# Patient Record
Sex: FEMALE | Race: WHITE | Employment: PART TIME | URBAN - NONMETROPOLITAN AREA
[De-identification: names, ages, dates, MRNs, and addresses within clinical notes are randomized per-mention and may not be internally consistent; named-entity substitution may affect disease eponyms.]

---

## 2020-08-05 ENCOUNTER — OFFICE VISIT (OUTPATIENT)
Dept: FAMILY MEDICINE CLINIC | Age: 20
End: 2020-08-05
Payer: COMMERCIAL

## 2020-08-05 VITALS
OXYGEN SATURATION: 99 % | HEART RATE: 121 BPM | HEIGHT: 68 IN | BODY MASS INDEX: 18.34 KG/M2 | SYSTOLIC BLOOD PRESSURE: 122 MMHG | DIASTOLIC BLOOD PRESSURE: 74 MMHG | WEIGHT: 121 LBS

## 2020-08-05 PROCEDURE — 99214 OFFICE O/P EST MOD 30 MIN: CPT | Performed by: INTERNAL MEDICINE

## 2020-08-05 RX ORDER — BUSPIRONE HYDROCHLORIDE 10 MG/1
10 TABLET ORAL 2 TIMES DAILY
COMMUNITY
Start: 2020-08-04 | End: 2020-11-19 | Stop reason: SDUPTHER

## 2020-08-05 RX ORDER — LORATADINE 10 MG/1
10 CAPSULE, LIQUID FILLED ORAL DAILY
COMMUNITY

## 2020-08-05 RX ORDER — BACLOFEN 10 MG/1
10 TABLET ORAL 2 TIMES DAILY
Qty: 60 TABLET | Refills: 3 | Status: SHIPPED | OUTPATIENT
Start: 2020-08-05 | End: 2020-11-19 | Stop reason: SDUPTHER

## 2020-08-05 RX ORDER — BACLOFEN 10 MG/1
10 TABLET ORAL 2 TIMES DAILY
COMMUNITY
End: 2020-08-05 | Stop reason: SDUPTHER

## 2020-08-05 RX ORDER — DULOXETIN HYDROCHLORIDE 30 MG/1
30 CAPSULE, DELAYED RELEASE ORAL 2 TIMES DAILY
COMMUNITY
End: 2020-11-19 | Stop reason: SDUPTHER

## 2020-08-05 RX ORDER — FLUDROCORTISONE ACETATE 0.1 MG/1
0.1 TABLET ORAL DAILY
COMMUNITY
End: 2021-01-15 | Stop reason: SDUPTHER

## 2020-08-05 RX ORDER — LAMOTRIGINE 100 MG/1
100 TABLET ORAL DAILY
COMMUNITY
End: 2020-11-19 | Stop reason: SDUPTHER

## 2020-08-05 SDOH — HEALTH STABILITY: MENTAL HEALTH: HOW OFTEN DO YOU HAVE A DRINK CONTAINING ALCOHOL?: NEVER

## 2020-08-05 ASSESSMENT — PATIENT HEALTH QUESTIONNAIRE - PHQ9
SUM OF ALL RESPONSES TO PHQ QUESTIONS 1-9: 0
SUM OF ALL RESPONSES TO PHQ QUESTIONS 1-9: 0
2. FEELING DOWN, DEPRESSED OR HOPELESS: 0
SUM OF ALL RESPONSES TO PHQ9 QUESTIONS 1 & 2: 0
1. LITTLE INTEREST OR PLEASURE IN DOING THINGS: 0

## 2020-08-05 NOTE — PROGRESS NOTES
[address]  [address]  Dept: [phone]  Dept Fax: [phone]  Loc: [phone]     Visit Date:  8/5/2020    Patient:  Luis Bustillo  YOB: 2000    HPI:   [facility] presents today for   Chief Complaint   Patient presents with    New Patient     patient is here today to establish and discuss disability paperwork. Kay Tirado HPI-20 year old male(biologicale female and now identifies as male) is coming in today to establish care with us as well as to get the forms filled filled out for his job. 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 History of postural orthostatic tachycardia syndrome chronic constipation and instability of multiple joints with joint pains, early satiety concerning for probable gastroparesis. Please note most of the records are placed in media by her genetic counselor as well as psychologist/psychiatrist.    Patient recently moved from [place] in May and is coming in today to establish with us as well as to get paperwork filled out for his job. 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 Given hypermobile Christen-Danlos associated syndrome causing symptoms he has noticed on prolonged standing and walking causes subluxation of femur/hip/knee as well as her pelvic area and causing joint instability/pain. Is been using stool as well as wearing the knee braces to help combat those effects. He had always had hip problems as a child since he got diagnosed. His mother suffers with the same disease as well. Used to have problems with subluxation of multiple joints all the time during soccer practices.       He has also noticed when he uses III, GERD (gastroesophageal reflux disease), Headache, Menorrhagia, Postural orthostatic tachycardia syndrome, Syringomyelia ([address] and Urinary incontinence. Past Surgical History   has a past surgical history that includes Cholecystectomy and [place] tooth extraction (2019). Family History  family history includes Alcohol Abuse in his maternal grandfather and paternal uncle; Dementia in his maternal grandmother; Depression in his father and mother; Heart Attack in his maternal grandfather, maternal uncle, and paternal grandfather; Heart Disease in his maternal grandfather, maternal uncle, mother, paternal grandfather, and sister; High Cholesterol in his maternal grandfather and mother; Learning Disabilities in his brother; Mental Illness in his brother, father, mother, paternal aunt, and paternal uncle; [de-identified] / [place] in his mother; Other in his mother; Substance Abuse in his paternal aunt, paternal cousin, and paternal uncle. Social History   reports that he has never smoked. He has never used smokeless tobacco. He reports that he does not drink alcohol or use drugs. Health Maintenance:    Health Maintenance   Topic Date Due    Varicella vaccine (1 of 2 - 2-dose childhood series) 09/24/2001    HPV vaccine (1 - 2-dose series) 09/24/2011    HIV screen  09/24/2015    Chlamydia screen  09/24/2016    DTaP/Tdap/Td vaccine (1 - Tdap) 09/24/2019    Flu vaccine (1) 09/01/2020    Hepatitis A vaccine  Aged Out    Hepatitis B vaccine  Aged Out    Hib vaccine  Aged Out    Meningococcal (ACWY) vaccine  Aged Out    Pneumococcal 0-64 years Vaccine  Aged Out       Subjective:      Review of Systems   Constitutional: Negative for fatigue, fever and unexpected weight change. HENT: Negative for ear pain, postnasal drip, rhinorrhea, sinus pain, sore throat and trouble swallowing. Eyes: Negative for visual disturbance. Respiratory: Negative for cough, chest tightness and shortness of breath. syndrome)  baclofen (LIORESAL) 10 MG tablet   6. Muscle spasm  baclofen (LIORESAL) 10 MG tablet   7. Arthralgia, unspecified joint  baclofen (LIORESAL) 10 MG tablet    Callie Phelps MD, OB/GYN, [place]   8. Dysmenorrhea  baclofen (LIORESAL) 10 MG tablet    Callie Phelps MD, OB/GYN, [place]   9. Menorrhagia with irregular cycle  baclofen (LIORESAL) 10 MG tablet    Callie Phelps MD, OB/GYN, [place]   10. Mood disorder (HCC)  baclofen (LIORESAL) 10 MG tablet   11. Gastroparesis  NM GASTRIC EMPTYING         PLAN     Forms filled out and authorization given to use crutches as well as stool especially with prolonged standing and prolonged walking. 25 pound weight restriction. Hip pain is worse with prolonged standing. Is been doing physical therapy and exercises at home. He reports he would get in touch with his genetic counselor to further get recommendations of establishing with a neurologist in the near future. Not interested in physical therapy referral today. Nuclear emptying study test placed. Refills om Baclofen placed. OBGYN referral placed as well. Orders Placed This Encounter   Procedures    NM GASTRIC EMPTYING     Standing Status:   Future     Standing Expiration Date:   8/5/2021     Order Specific Question:   Reason for exam:     Answer:   early satiety,nausea after eating/chronic constipation-twice a week, bloating    Nanette Lucero MD, OB/GYN, [place]     Referral Priority:   Routine     Referral Type:   Eval and Treat     Referral Reason:   Specialty Services Required     Referred to Provider:   Inge Tolentino MD     Requested Specialty:   Obstetrics & Gynecology     Number of Visits Requested:   1     No follow-ups on file. Patient given educational materials - see patient instructions. Discussed use, benefit, and side effects of prescribed medications. All patient questions answered. Pt voiced understanding. Reviewed health maintenance. Electronically signed Phi Oneal MD on 8/5/2020 at 2:24 PM EDT

## 2020-08-06 ASSESSMENT — ENCOUNTER SYMPTOMS
COUGH: 0
BLOOD IN STOOL: 0
DIARRHEA: 0
RHINORRHEA: 0
CHEST TIGHTNESS: 0
SORE THROAT: 0
SHORTNESS OF BREATH: 0
TROUBLE SWALLOWING: 0
ABDOMINAL PAIN: 0
SINUS PAIN: 0

## 2020-08-26 ENCOUNTER — OFFICE VISIT (OUTPATIENT)
Dept: FAMILY MEDICINE CLINIC | Age: 20
End: 2020-08-26
Payer: COMMERCIAL

## 2020-08-26 ENCOUNTER — HOSPITAL ENCOUNTER (OUTPATIENT)
Age: 20
Setting detail: SPECIMEN
Discharge: HOME | End: 2020-08-26
Payer: COMMERCIAL

## 2020-08-26 VITALS
WEIGHT: 124 LBS | HEART RATE: 108 BPM | OXYGEN SATURATION: 99 % | BODY MASS INDEX: 18.99 KG/M2 | SYSTOLIC BLOOD PRESSURE: 122 MMHG | DIASTOLIC BLOOD PRESSURE: 78 MMHG

## 2020-08-26 LAB
ANION GAP SERPL CALCULATED.3IONS-SCNC: 8 MMOL/L (ref 9–17)
BUN BLDV-MCNC: 14 MG/DL (ref 6–20)
BUN/CREAT BLD: 22 (ref 9–20)
CALCIUM SERPL-MCNC: 9.5 MG/DL (ref 8.6–10.4)
CHLORIDE BLD-SCNC: 105 MMOL/L (ref 98–107)
CO2: 28 MMOL/L (ref 20–31)
CREAT SERPL-MCNC: 0.63 MG/DL (ref 0.5–0.9)
GFR AFRICAN AMERICAN: ABNORMAL ML/MIN
GFR NON-AFRICAN AMERICAN: ABNORMAL ML/MIN
GFR SERPL CREATININE-BSD FRML MDRD: ABNORMAL ML/MIN/{1.73_M2}
GFR SERPL CREATININE-BSD FRML MDRD: ABNORMAL ML/MIN/{1.73_M2}
GLUCOSE BLD-MCNC: 72 MG/DL (ref 70–99)
POTASSIUM SERPL-SCNC: 4 MMOL/L (ref 3.7–5.3)
SODIUM BLD-SCNC: 141 MMOL/L (ref 135–144)

## 2020-08-26 PROCEDURE — 99214 OFFICE O/P EST MOD 30 MIN: CPT | Performed by: INTERNAL MEDICINE

## 2020-08-26 PROCEDURE — 36415 COLL VENOUS BLD VENIPUNCTURE: CPT

## 2020-08-26 PROCEDURE — 80048 BASIC METABOLIC PNL TOTAL CA: CPT

## 2020-08-26 NOTE — PROGRESS NOTES
[address]  [address]  Dept: [phone]  Dept Fax: [phone]  Loc: [phone]     Visit Date:  8/26/2020    Patient:  Vanita Benson  YOB: 2000    HPI:   Vanita Benson presents today for   Chief Complaint   Patient presents with    Other     patient is here today to have form filled out for work. Donell Rodas HPI 23year old patient with a history of hypermobile Christen-Danlos syndrome associated with chronic muscle aches and pains, instability of her joints, hypermobility of her joints as well as postural orthostatic tachycardia syndrome, chronic constipation and possible gastroparesis is coming in today requesting forms to be filled out for her work. Is requesting upright MRIs of her spine given her history of craniocervical instability and chronic low back pain with joint instability with hypermobility pf joints. Severe pain in the back while lifting objects, bending and straightening the spine. A feeling of locking in between a physical activity such as getting up from a chair. Muscle spasms, Pain may radiate down into the legs and buttocks, generally affecting one side of the body Numbness in the lower extremities and arms. The symptoms may get aggravated after prolonged sitting or standing. Medications  Prior to Visit Medications    Medication Sig Taking?  Authorizing Provider   norethindrone (AYGESTIN) 5 MG tablet Take 0.35 mg by mouth daily  Historical Provider, MD   lamoTRIgine (LAMICTAL) 100 MG tablet Take 100 mg by mouth daily Take one daily  Historical Provider, MD   DULoxetine (CYMBALTA) 30 MG extended release capsule Take 30 mg by mouth 2 times daily Take 1 pill twice daily  Historical Provider, MD   busPIRone (BUSPAR) 10 MG tablet 10 mg 2 times daily Take one pill twice daily  Historical Provider, MD   fludrocortisone (FLORINEF) 0.1 MG tablet Take 0.1 mg by mouth daily Take one pill once daily  Historical Provider, MD   loratadine (CLARITIN) 10 MG capsule Take 10 mg by mouth daily  Historical Provider, MD   baclofen (LIORESAL) 10 MG tablet Take 1 tablet by mouth 2 times daily  Tez Mena MD        Allergies:  is allergic to reglan [metoclopramide]; corn-containing products; abilify [aripiprazole]; and aloe vera. Past Medical History:   has a past medical history of Anxiety, Autism spectrum disorder, Bipolar disorder ([address] Depression, Dysmenorrhea, Christen-Danlos syndrome type III, GERD (gastroesophageal reflux disease), Headache, Menorrhagia, Postural orthostatic tachycardia syndrome, Syringomyelia ([address] and Urinary incontinence. Past Surgical History   has a past surgical history that includes Cholecystectomy and [place] tooth extraction (2019). Family History  family history includes Alcohol Abuse in his maternal grandfather and paternal uncle; Dementia in his maternal grandmother; Depression in his father and mother; Heart Attack in his maternal grandfather, maternal uncle, and paternal grandfather; Heart Disease in his maternal grandfather, maternal uncle, mother, paternal grandfather, and sister; High Cholesterol in his maternal grandfather and mother; Learning Disabilities in his brother; Mental Illness in his brother, father, mother, paternal aunt, and paternal uncle; [de-identified] / [place] in his mother; Other in his mother; Substance Abuse in his paternal aunt, paternal cousin, and paternal uncle. Social History   reports that he has never smoked. He has never used smokeless tobacco. He reports that he does not drink alcohol or use drugs.     Health Maintenance:    Health Maintenance   Topic Date Due    Varicella vaccine (1 of 2 - 2-dose childhood series) 09/24/2001    HPV vaccine (1 - 2-dose series) 09/24/2011    HIV screen  09/24/2015    Chlamydia screen  09/24/2016    DTaP/Tdap/Td vaccine (1 - Tdap) 09/24/2019    Flu vaccine (1) 09/01/2020    Hepatitis A vaccine  Aged Out    Hepatitis B vaccine  Aged Out    Hib vaccine  Aged Out    Meningococcal (ACWY) vaccine  Aged Out    Pneumococcal 0-64 years Vaccine  Aged Out       Subjective:      Review of Systems   Constitutional: Negative for fatigue, fever and unexpected weight change. HENT: Negative for ear pain, postnasal drip, rhinorrhea, sinus pain, sore throat and trouble swallowing. Eyes: Negative for visual disturbance. Respiratory: Negative for cough, chest tightness and shortness of breath. Cardiovascular: Negative for chest pain and leg swelling. Gastrointestinal: Negative for abdominal pain, blood in stool and diarrhea. Endocrine: Negative for polyuria. Genitourinary: Negative for difficulty urinating and flank pain. Musculoskeletal: Negative for arthralgias, joint swelling and myalgias. Skin: Negative for rash. Allergic/Immunologic: Negative for environmental allergies. Neurological: Negative for weakness, light-headedness, numbness and headaches. Hematological: Negative for adenopathy. Psychiatric/Behavioral: Negative for behavioral problems and suicidal ideas. The patient is not nervous/anxious. Objective:     /78 (Site: Left Upper Arm, Position: Sitting)   Pulse 108   Wt 124 lb (56.2 kg)   SpO2 99%   BMI 18.99 kg/m²     Physical Exam  Vitals signs and nursing note reviewed. HENT:      Head: Normocephalic and atraumatic. Cardiovascular:      Rate and Rhythm: Normal rate and regular rhythm. Pulmonary:      Effort: Pulmonary effort is normal.      Breath sounds: Normal breath sounds. No stridor. Musculoskeletal:      Comments: Passive apposition of the thumb to the flexor aspect of the forearm  Flexion of waist with palms on the floor (and with the knees fully extended)     Skin:     General: Skin is warm. Comments: Skin hyperextensibility. Neurological:      Mental Status: He is oriented to person, place, and time. Mental status is at baseline. Psychiatric:         Mood and Affect: Mood normal.             Assessment       Diagnosis Orders   1. Spinal instability of cervicothoracic region  MRI CERVICAL SPINE WITH CONTRAST    MRI THORACIC SPINE W CONTRAST    MRI LUMBAR SPINE W CONTRAST    Basic Metabolic Panel   2. Christen-Danlos syndrome  MRI CERVICAL SPINE WITH CONTRAST    MRI THORACIC SPINE W CONTRAST    MRI LUMBAR SPINE W CONTRAST    Basic Metabolic Panel   3. Chronic low back pain, unspecified back pain laterality, unspecified whether sciatica present  MRI CERVICAL SPINE WITH CONTRAST    MRI THORACIC SPINE W CONTRAST    MRI LUMBAR SPINE W CONTRAST    Basic Metabolic Panel   4. Joint instability  MRI CERVICAL SPINE WITH CONTRAST    MRI THORACIC SPINE W CONTRAST    MRI LUMBAR SPINE W CONTRAST    Basic Metabolic Panel   5. Hypermobile joints  MRI CERVICAL SPINE WITH CONTRAST    MRI THORACIC SPINE W CONTRAST    MRI LUMBAR SPINE W CONTRAST    Basic Metabolic Panel         PLAN     Upright MRI C/T/L spine with contrast ordered. Encourage muscle strengthening exercises.          Orders Placed This Encounter   Procedures    MRI CERVICAL SPINE WITH CONTRAST     Standing Status:   Future     Standing Expiration Date:   8/26/2021     Order Specific Question:   Reason for exam:     Answer:   cervicothoracic spinal instability/low back pain/christen danlos syndrome/hypermobility    MRI THORACIC SPINE W CONTRAST     Standing Status:   Future     Standing Expiration Date:   8/26/2021     Order Specific Question:   Reason for exam:     Answer:   cervicothoracic spinal instability/low back pain/christen danlos syndrome/hypermobility    MRI LUMBAR SPINE W CONTRAST     Standing Status:   Future     Standing Expiration Date:   8/26/2021     Order Specific Question:   Reason for exam:     Answer:   cervicothoracic spinal instability/low back pain/christen danlos syndrome/hypermobility    Basic Metabolic Panel     Standing Status:   Future Number of Occurrences:   1     Standing Expiration Date:   8/26/2021        No follow-ups on file. Patient given educational materials - see patient instructions. Discussed use, benefit, and side effects of prescribed medications. All patient questions answered. Pt voiced understanding. Reviewed health maintenance.        Electronically signed Gordon Rapp MD on 8/26/2020 at 11:14 AM EDT

## 2020-08-27 ASSESSMENT — ENCOUNTER SYMPTOMS
SINUS PAIN: 0
DIARRHEA: 0
BLOOD IN STOOL: 0
CHEST TIGHTNESS: 0
COUGH: 0
RHINORRHEA: 0
ABDOMINAL PAIN: 0
TROUBLE SWALLOWING: 0
SHORTNESS OF BREATH: 0
SORE THROAT: 0

## 2020-08-31 ENCOUNTER — TELEPHONE (OUTPATIENT)
Dept: FAMILY MEDICINE CLINIC | Age: 20
End: 2020-08-31

## 2020-09-16 NOTE — TELEPHONE ENCOUNTER
I called and spoke with Luis Armando of [facility] and they are going to initiate a new request for MRI. I called and spoke with Ale with Luis Armando to start new precert.

## 2020-09-17 ENCOUNTER — TELEPHONE (OUTPATIENT)
Dept: FAMILY MEDICINE CLINIC | Age: 20
End: 2020-09-17

## 2020-09-17 ENCOUNTER — HOSPITAL ENCOUNTER (OUTPATIENT)
Age: 20
Setting detail: SPECIMEN
Discharge: HOME | End: 2020-09-17
Payer: COMMERCIAL

## 2020-09-17 LAB
CHOLESTEROL/HDL RATIO: 2.6
CHOLESTEROL: 168 MG/DL
HDLC SERPL-MCNC: 64 MG/DL
LDL CHOLESTEROL: 93 MG/DL (ref 0–130)
TRIGL SERPL-MCNC: 53 MG/DL
VLDLC SERPL CALC-MCNC: NORMAL MG/DL (ref 1–30)

## 2020-09-17 PROCEDURE — 80061 LIPID PANEL: CPT

## 2020-09-17 PROCEDURE — 36415 COLL VENOUS BLD VENIPUNCTURE: CPT

## 2020-09-28 ENCOUNTER — TELEPHONE (OUTPATIENT)
Dept: FAMILY MEDICINE CLINIC | Age: 20
End: 2020-09-28

## 2020-09-29 ENCOUNTER — HOSPITAL ENCOUNTER (OUTPATIENT)
Dept: GENERAL RADIOLOGY | Age: 20
Discharge: HOME | End: 2020-10-01
Payer: COMMERCIAL

## 2020-09-29 ENCOUNTER — NURSE ONLY (OUTPATIENT)
Dept: FAMILY MEDICINE CLINIC | Age: 20
End: 2020-09-29
Payer: COMMERCIAL

## 2020-09-29 PROCEDURE — 90471 IMMUNIZATION ADMIN: CPT | Performed by: INTERNAL MEDICINE

## 2020-09-29 PROCEDURE — 72202 X-RAY EXAM SI JOINTS 3/> VWS: CPT

## 2020-09-29 PROCEDURE — 73521 X-RAY EXAM HIPS BI 2 VIEWS: CPT

## 2020-09-29 PROCEDURE — 90686 IIV4 VACC NO PRSV 0.5 ML IM: CPT | Performed by: INTERNAL MEDICINE

## 2020-09-29 NOTE — PROGRESS NOTES
Vaccine Information Sheet, \"Influenza - Inactivated\"  given to Marcellus Garces  ,or parent/legal guardian of  Marcellus Garces and verbalized understanding. Patient responses:    Have you ever had a reaction to a flu vaccine? No  Are you able to eat eggs without adverse effects? Yes  Do you have any current illness? No  Have you ever had [place] Syndrome? No    Flu vaccine given per order. Please see immunization tab.

## 2020-10-14 ENCOUNTER — OFFICE VISIT (OUTPATIENT)
Dept: ORTHOPEDIC SURGERY | Age: 20
End: 2020-10-14
Payer: COMMERCIAL

## 2020-10-14 VITALS — HEIGHT: 68 IN | WEIGHT: 128 LBS | BODY MASS INDEX: 19.4 KG/M2

## 2020-10-14 PROCEDURE — 99203 OFFICE O/P NEW LOW 30 MIN: CPT | Performed by: ORTHOPAEDIC SURGERY

## 2020-10-14 ASSESSMENT — ENCOUNTER SYMPTOMS
VOMITING: 0
CHEST TIGHTNESS: 0
ABDOMINAL PAIN: 0
COUGH: 0
APNEA: 0

## 2020-10-14 NOTE — PROGRESS NOTES
[facility]  [id number] [facility] [address]  Dept: [phone]    Ambulatory Orthopedic New Patient Visit      CHIEF COMPLAINT:    Chief Complaint   Patient presents with    New Patient     Femoral anteversion        HISTORY OF PRESENT ILLNESS:      The patient is a 21 y.o. adult who is being seen  for consultation and evaluation of bilateral hip pain with the right hip being the main issue. Patient states that he has had pain to the right hip since he was 6years old. The patient states that there was no injuries to the hips. He has pain with long distance walking. The patient states that he has been diagnosed with a connective tissue disorder and has his right hip dislocate often. The patient recalls dislocation to the right hip since he was 12years old. The patient states that after he placed his hip back into place the pain lessens but is not able to do activities normally. The patient uses crutches for the hips since June of 2019. The crutches were prescribed by PCP for the hip issue. Patient states that he does walk around without the crutches at times. Patient has tried some formal therapy for the hips and did note that the therapy did not help. The patients right hip is worse than his left hip. Patient has a history of Christen-Danlos syndrome, Spinal instability of cervical region, Syringomyelia.          Past Medical History:    Past Medical History:   Diagnosis Date    Anxiety     Autism spectrum disorder 2019    Bipolar disorder ([address]     Depression     Dysmenorrhea     Christen-Danlos syndrome type III 2019    GERD (gastroesophageal reflux disease)     Headache     Menorrhagia     Postural orthostatic tachycardia syndrome 2014    Syringomyelia ([address] 2019    Urinary incontinence        Past Surgical History:    Past Surgical History:   Procedure Laterality Date    CHOLECYSTECTOMY      WISDOM TOOTH EXTRACTION  2019       Current Medications:   Current Outpatient Medications   Medication Sig Dispense Refill    norethindrone (AYGESTIN) 5 MG tablet Take 0.35 mg by mouth daily      lamoTRIgine (LAMICTAL) 100 MG tablet Take 100 mg by mouth daily Take one daily      DULoxetine (CYMBALTA) 30 MG extended release capsule Take 30 mg by mouth 2 times daily Take 1 pill twice daily      busPIRone (BUSPAR) 10 MG tablet 10 mg 2 times daily Take one pill twice daily      fludrocortisone (FLORINEF) 0.1 MG tablet Take 0.1 mg by mouth daily Take one pill once daily      loratadine (CLARITIN) 10 MG capsule Take 10 mg by mouth daily      baclofen (LIORESAL) 10 MG tablet Take 1 tablet by mouth 2 times daily 60 tablet 3     No current facility-administered medications for this visit. Allergies:    Reglan [metoclopramide]; Corn-containing products;  Abilify [aripiprazole]; and Aloe vera    Social History:   Social History     Socioeconomic History    Marital status: Single     Spouse name: Not on file    Number of children: Not on file    Years of education: Not on file    Highest education level: Not on file   Occupational History    Not on file   Social Needs    Financial resource strain: Not on file    Food insecurity     Worry: Not on file     Inability: Not on file    Transportation needs     Medical: Not on file     Non-medical: Not on file   Tobacco Use    Smoking status: Never Smoker    Smokeless tobacco: Never Used   Substance and Sexual Activity    Alcohol use: Never     Frequency: Never    Drug use: Never    Sexual activity: Never   Lifestyle    Physical activity     Days per week: Not on file     Minutes per session: Not on file    Stress: Not on file   Relationships    Social connections     Talks on phone: Not on file     Gets together: Not on file     Attends Mandaeism service: Not on file     Active member of club or organization: Not on file     Attends meetings of clubs or organizations: Not on file     Relationship status: Not on file    Intimate partner violence     Fear of current or ex partner: Not on file     Emotionally abused: Not on file     Physically abused: Not on file     Forced sexual activity: Not on file   Other Topics Concern    Not on file   Social History Narrative    Not on file       Family History:  Family History   Problem Relation Age of Onset    Heart Disease Mother     Other Mother     Mental Illness Mother    Juan Snow / Reynold Mother     Depression Mother     High Cholesterol Mother     Mental Illness Father     Depression Father     Heart Disease Sister     Mental Illness Brother     Learning Disabilities Brother     Heart Attack Maternal Uncle     Heart Disease Maternal Uncle     Mental Illness Paternal Aunt     Substance Abuse Paternal Aunt     Mental Illness Paternal Uncle     Substance Abuse Paternal Uncle     Alcohol Abuse Paternal Uncle     Dementia Maternal Grandmother     Heart Attack Maternal Grandfather     Heart Disease Maternal Grandfather     High Cholesterol Maternal Grandfather     Alcohol Abuse Maternal Grandfather     Heart Attack Paternal Grandfather     Heart Disease Paternal Grandfather     Substance Abuse Paternal Cousin          REVIEW OF SYSTEMS:  Review of Systems   Constitutional: Negative for chills and fever. Respiratory: Negative for apnea, cough and chest tightness. Cardiovascular: Negative for chest pain and palpitations. Gastrointestinal: Negative for abdominal pain and vomiting. Genitourinary: Negative for difficulty urinating. Musculoskeletal: Positive for arthralgias (bilateral hips). Negative for gait problem, joint swelling and myalgias. Neurological: Negative for dizziness, weakness and numbness. I have reviewed the CC, HPI, ROS, PMH, FHX, Social History.    I agree with the documentation provided by other staff, residents and havereviewed their documentation prior to providing my signature indicating agreement. PHYSICAL EXAM:  Ht 5' 8\" (1.727 m)   Wt 128 lb (58.1 kg)   BMI 19.46 kg/m²  Body mass index is 19.46 kg/m². Physical Exam  Gen: alert and oriented  Psych:  Appropriate affect; Appropriate knowledge base; Appropriate mood; No hallucinations; Head: normocephalic atraumatic   Chest: symmetric chest excursion  Pelvis: stable  Ortho Exam  Extremity:   Patient ambulates with the assistance of bilateral arm crutches. No significant antalgia is appreciated. Patient has mild recurvatum of the bilateral knees. Range of motion of the right hip is passively to 30 degrees of external rotation, 85 degrees of internal rotation with mild crepitance at the extremes of range of motion. Left hip range of motion passively is 80 degrees of internal rotation and 30 degrees of external rotation. Motor, sensory, vascular examination of the bilateral lower extremities is grossly intact without focal deficits. Visible intoeing bilaterally is appreciated worse on the right than the left. No significant tibial torsion or metatarsus adductus is appreciated. Radiology:     Xr Sacroiliac Joints (min 3 Views)    Result Date: 9/29/2020  EXAMINATION: THREE XRAY VIEWS OF THE SACRO-ILIAC JOINTS 9/29/2020 4:00 pm COMPARISON: None.  HISTORY: ORDERING SYSTEM PROVIDED HISTORY: Christen-Danlos syndrome TECHNOLOGIST PROVIDED HISTORY: History of Konstantin [place] Syndrome/Subluxation of Hip/Hip Instability/joint hypermobility Reason for Exam: Christen-Danlos syndrome, patient states she dislocates her hips and SI joints often, can only walk for about  45 mins before a hip pops out FINDINGS: The sacroiliac joints are bilaterally well imaged and are uncompromised without definite erosive, significant degenerative change, bony fusion or destructive lesion     Unremarkable sacroiliac joint series     Xr Hip Bilateral W Ap Pelvis (2 Views)    Result Date: 9/29/2020  EXAMINATION: ONE XRAY VIEW OF THE PELVIS AND TWO XRAY VIEWS OF EACH OF THE BILATERAL HIPS 9/29/2020 4:00 pm COMPARISON: None. HISTORY: ORDERING SYSTEM PROVIDED HISTORY: Christen-Danlos syndrome TECHNOLOGIST PROVIDED HISTORY: Need AP view/Lateral view and frog leg lateral View- History of Roslynn Doing Syndrome/Subluxation of Hip/Hip Instability/joint hypermobility Reason for Exam: Christen-Danlos syndrome, patient states she dislocates her hips and SI joints often, can only walk for about  45 mins before a hip pops out FINDINGS: The bilateral sacroiliac and hip joints are well imaged and are uncompromised without definite fracture, dislocation, significant degenerative change or bony destructive lesion     Unremarkable pelvis and bilateral hip series         ASSESSMENT:     1. Right hip pain         PLAN:    Discussed with the patient that I would like to work him up for a possible labral tear of the right hip. I discussed with the patient that I do feel it could be related to the connect tissue disorder he has been diagnosed with. The patient voices understanding at this time. Discussed a referral to a hip preservation specialist. Would like to get MRI arthrogram of the right hip to rule out labral tear then will refer to hip preservation specialist to discuss options for him going forward. Discussed trying formal therapy again but the patient is very hesitant due to the pain it put him in last time he tried. Discussed with the patient the option of alter gravity machine and water therapy so that the therapy is not as tough on the patient's body. The patient to follow up after therapy and MRI is completed. Patient knows to call with any questions or concerns. Return if symptoms worsen or fail to improve. No orders of the defined types were placed in this encounter.       Orders Placed This Encounter   Procedures    MRI HIP RIGHT W CONTRAST     Standing Status:   Future     Standing Expiration Date:   10/14/2021     Order Specific Question:   Reason for exam:

## 2020-10-16 ENCOUNTER — TELEPHONE (OUTPATIENT)
Dept: GENERAL RADIOLOGY | Age: 20
End: 2020-10-16

## 2020-10-16 NOTE — TELEPHONE ENCOUNTER
Fela Horne is scheduled for a Right Hip MRI Arthrogram on Tuesday 11/3 at 12:30. In addition to the 2 orders that are already placed, can you please also order IM [id number], as this is per protocol.     Thanks,  Luanne. 78

## 2020-10-20 ENCOUNTER — HOSPITAL ENCOUNTER (OUTPATIENT)
Dept: PHYSICAL THERAPY | Facility: CLINIC | Age: 20
Setting detail: THERAPIES SERIES
Discharge: HOME | End: 2020-10-20
Payer: COMMERCIAL

## 2020-10-20 PROCEDURE — 97162 PT EVAL MOD COMPLEX 30 MIN: CPT

## 2020-10-20 PROCEDURE — 97110 THERAPEUTIC EXERCISES: CPT

## 2020-10-20 NOTE — CONSULTS
[x] [facility]    [address] [place] [address]    Phone: [phone]    Fax:  [phone]     Physical Therapy Evaluation    Date:  10/20/2020  Patient: Neymar Gomez \"Marie\" : 2000  MRN: [id number]  Physician: Dr. Earnestine Ryan: NIRU  Medical Diagnosis:  R hip pain  Rehab Codes: M25.551  Onset date:  10/14/20    Next Dr's appt.: unknown    Subjective:   CC: Pt reports knee and ankle pain after walking for 15 min. I can brace those and tough it out. I can't brace the hips though. I have been doing shoulder and neck exercises  But any exercises that I have been given in the past for knee and hip have caused increased pain.  Pt reports she finally gave in to using forearm crutches because her R hip was dislocating and there was potential to damage it and get arthritis      PMHx: [] Unremarkable [] Diabetes [] HTN  [] Pacemaker   [] MI/Heart Problems [] Cancer [] Arthritis  [] Other:              [x] Refer to full medical chart  In EPIC     Tests: [] X-Ray: [] MRI: planned 20 [x] none:     Medications: [x] Refer to full medical record [] None [] Other:  Allergies:      [x] Refer to full medical record [] None [] Other:    Working:  [] Normal Duty  [] Light Duty  [x] Off D/T Condition  [] Retired    [] Not Employed    []  Disability  [] Other:           Return to work:     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 Drawer   []   Hip Flex WNL WNL   Post. Drawer   []   Ext WNL 15 4+ 4- Lachmans   []   ER 30 30 4+ 4+ Valgus Stress   []   IR 70 70   Varus Stress   []   ABD WNL WNL 4+ 4- Yings   []   Knee Flex WNL WNL   FADIRs   []   Ext WNL WNL   Hip Scouring   []   Ankle DF WNL WNL   DARSHANs   []   PF WNL WNL   Piriformis   []   INV WNL WNL   Andrews   []   EVER WNL WNL   Joey     []       OBSERVATION No Deficit Deficit Not Tested Comments   Posture       Forward Head [] [x] []    Rounded Shoulders [] [x] []    Kyphosis [] [] []    Lordosis [] [] []    Scoliosis [] [] []    Iliac Crest [] [] []    PSIS [] [] []    ASIS [] [] []    Genu Valgus [] [] []    Genu Varus [] [] []    Genu Recurvatum [] [] []    Pronation [] [] []    Supination [] [] []    Leg Length Discrp [] [] []    Slumped Sitting [] [] []    Palpation [] [x] [] Hip flexor , piriformis and glute med spasm   Sensation [x] [] []    Edema [] [] []    Neurological [] [] []    Patellar Mobility [] [] []    Patellar Orientation [] [] []    Gait [] [x] [] Analysis:  Pt ambulates with decreased stance time on R using 1 forearm crutch. Shortened step length on L       Comments:  Assessment:Patient would benefit from skilled physical therapy services in order to: increase hip strength and single leg stability to allow pt to walk normal and be more active  Problems:    [x] ? Pain:     [] ? ROM:    [x] ? Strength:    [x] ? Function: Not able to run as s he wishes   [] ? Balance  [] Increased edema:  [] Postural Deviations  [x] Gait Deviations  [x]   LEFI   [] Other:      STG: (to be met in 10 treatments)  1. ? Pain:<3/10 R hip with all activity  2. ? ROM:Normal R hip extension  3. ? Strength:5/5 hip strength to improve ease of ADLs  4. ? Function:Able to walk with 1 forearm crutch x30 min with <3/10 pain R hip  5. Independent with Home Exercise Programs    LTG: (to be met in 20 treatments)  1. No pain R hip  2.  Able to squat with proper form to be able to perform daily tasks more easily  3. LEFI score <15% disability  4. Able to walk 30 min painfree with least restrictive device                   Patient goals:want to get back to hiking and biking and being a normal     Rehab Potential:  [x] Good  [] Fair  [] Poor   Suggested Professional Referral:  [x] No  [] Yes:  Barriers to Goal Achievement[de-identified]  [x] No  [] Yes:  Domestic Concerns:  [x] No  [] Yes:    Pt. Education:  [x] Plans/Goals, Risks/Benefits discussed  [x] Home exercise program    Method of Education: [x] Verbal  [x] Demo  [x] Written  Comprehension of Education:  [x] Verbalizes understanding. [] Demonstrates understanding. [] Needs Review. [] Demonstrates/verbalizes understanding of HEP/Ed previously given.     Treatment Plan:  [x] Therapeutic Exercise    [x] Therapeutic Activity  [x] Manual Therapy   [x] Alter G treadmill  [x] Phys perf test     [x] Vasocompression/Game Ready   [x] Neuromuscular Re-education [x] Instruction in HEP     [x] Aquatic Therapy                           Frequency:  2x/week for 20 visits    Todays Treatment:  Modalities: prn  Precautions: No stretching or joint mobilization due to Christen-Danlos Syndrome  Exercises:  Exercise Reps/ Time Weight/ Level Issued for HEP  MOBO Y/N Comments   Postural ed x   x     Prone         Hip ER sherry 10x10\"  x x     Hip ext (glut max) 2sets  x x     [place] hip ext         Supine         2 legged bridges         1 legged bridges         PB hamstring curls      Hips to remain in neutral to ext   [employer] 90/30 deg 2 sets  x x      hip abd         Quadruped         Donkey kicks      Bar across pelvis   [place] twist         Gym         Tippy bird      1/2 legged   Monster walks         Hip thrusts         Step downs      Posterior and lateral   Posterior sling ex      On cable column   Ski jumper lunges         Functional reach         Reverse twisting lunge         RDLs      Retract scaps, one hand over, one hand under   Resisted C skip       Neutral L spine   Front squats      No L ext   Resisted Push Press         Ninja jumps      Soft landings on box; jump up/step down   Depth drops         Depth jumps      Jump down w/ low angelica   Med ball cleans      Start on chest, elbows wide   Other:    Specific Instructions for next treatment: Aquatics with walking fwd/retro/lateral, HR, mini squat, step up    Treatment Charges: Mins Units   [x] Evaluation       [x]  Low       []  Moderate       []  High 20 1   [] Phys perf test     [x]  Ther Exercise 25 2   []  Manual Therapy     []  Ther Activities     []  Aquatics     []  Vasocompression     []  NMR       TOTAL TREATMENT TIME: 45    Time in: 1800   Time WNO:[id number]    Electronically signed by: Idalia Shin PT        Physician Signature:________________________________Date:__________________  By signing above or cosigning this note, I have reviewed this plan of care and certify a need for medically necessary rehabilitation services.      *PLEASE SIGN ABOVE AND FAX BACK ALL PAGES*

## 2020-10-21 ENCOUNTER — HOSPITAL ENCOUNTER (OUTPATIENT)
Dept: PHYSICAL THERAPY | Facility: CLINIC | Age: 20
Setting detail: THERAPIES SERIES
Discharge: HOME | End: 2020-10-21
Payer: COMMERCIAL

## 2020-10-21 PROCEDURE — 97113 AQUATIC THERAPY/EXERCISES: CPT

## 2020-10-21 NOTE — FLOWSHEET NOTE
[] [facility]       [facility]       [facility]        [address]       Phone: [phone]       Fax: [phone] [] [facility]       Promotion at [address] [facility]       Phone: [phone]       Fax: [phone] [] [place]. [address] [place] [address] [facility]  [address]   Phone: [phone]   Fax:  [phone]     Physical Therapy Daily  Aquatic Treatment Note    Date:  10/21/2020  Patient Name:  Zuhair Lei"   :  2000  MRN: [id number]  Physician: Dr. Pérez Quant: Kurt Paget Diagnosis:   R hip pain                    Rehab Codes: M25.551  Onset date:    10/14/20                                              Next 's appt.: unknow    Visit# / total visits:   Cancels/No Shows:     Subjective:    Pain:  [x] Yes  [] No Location: bilat knees Pain Rating: (0-10 scale) 1-2/10  Pain altered Tx:  [x] No  [] Yes  Action:  Comments:Pt reports hip pain not too bad today, has more discomfort in knees today.     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     KEY  B = Belt G = Gloves N = Noodle   C = Cuffs K = Kickboard P = Paddles   CC = Cervical Collar L = Laps T = Theratube   DB = Dumbells M = Minutes W = Weights     Exercises/Activities  Warm-up/Amb 10/21 Dynamic Exercises 10/21   Forward 3L March    Sideways 3L Squat    Backwards 3L Retro HS curls      Retro SLR    Stretches  Braiding    Gastroc/Soleus  Heel to Toe amb    Hamstring  Toe amb    Hip flexor  Heel amb    Piriformis      SKTC      Pec Stretch      Post Deltoid  Static Exercises UE      Shoulder flex/ext    Static Exercises LE  Shoulder abd/add    Heel/toe raises 15 Shoulder H.  abd/add    Marches 15 Shoulder IR/ER    Mini-squats 10 Rowing    4-way hip  10 abd/ext Arm Circles    Hamstring curls 10 UT shrugs/rolls    Hip Circles/Fig 8  Scap squeezes    Ankle ROM  Diagonals 1/2    Lunges   Elbow flex/ext      Pron/Sup    Functional Exercise  Wrist AROM    Step 15F left, 10F right     Wall Push-ups  Deep H20/    SLS  Bike 3m   Breast Stroke on Noodle  Hip abd/add    Noodle Twist  Hip flex/ext    Noodle Push down  Hip IR/ER    Kickboard push/pull  Knee flex/ext      Push/pull on [employer] 5m   Other:    Specific Instructions for next treatment:dynamic ex, deep water hip abd/add    Treatment Charges: Mins Units   []  Modalities     []  Ther Exercise     []  Manual Therapy     []  Ther Activities     [x]  Aquatics 30 2   []  Other       Assessment: [x] [facility]ing toward goals. Initiates light aquatic ex per flow sheet with VC for TA contr throughout and to keep motions small for hip and knees. Pain occ bilat knees with certain motions, and only 10 reps forward step ups Right LE due to pain medial knee. Pt feels muscle fatigue LE after deep water biking. Plan to monitor response to tx and progress as pt dinora. [] No change. [] Other:  STG: (to be met in 10 treatments)  1. ? Pain:<3/10 R hip with all activity  2. ? ROM:Normal R hip extension  3. ? Strength:5/5 hip strength to improve ease of ADLs  4. ? Function:Able to walk with 1 forearm crutch x30 min with <3/10 pain R hip  5. Independent with Home Exercise Programs     LTG: (to be met in 20 treatments)  1. No pain R hip  2. Able to squat with proper form to be able to perform daily tasks more easily  3. LEFI score <15% disability  Able to walk 30 min painfree with least restrictive device    Pt. Education:  [x] Yes  [] No  [] Reviewed Prior HEP/Ed  Method of Education: [x] Verbal  [] Demo  [] Written  Comprehension of Education:  [x] Verbalizes understanding. [] Demonstrates understanding. [] Needs review. [] Demonstrates/verbalizes HEP/Ed previously given. Plan: [x] Continue per plan of care.    [] Other:      Time In:4:00pm            Time Out: 4:35pm    Electronically signed by: [address], PTA

## 2020-10-26 ENCOUNTER — HOSPITAL ENCOUNTER (OUTPATIENT)
Dept: PHYSICAL THERAPY | Facility: CLINIC | Age: 20
Setting detail: THERAPIES SERIES
Discharge: HOME | End: 2020-10-26
Payer: COMMERCIAL

## 2020-10-26 PROCEDURE — 97113 AQUATIC THERAPY/EXERCISES: CPT

## 2020-10-26 NOTE — FLOWSHEET NOTE
10 15 Rowing     4-way hip  10 abd/ext 15 abd/ext Arm Circles     Hamstring curls 10 15 UT shrugs/rolls     Hip Circles/Fig 8   Scap squeezes     Ankle ROM   Diagonals 1/2     Lunges    Elbow flex/ext        Pron/Sup     Functional Exercise   Wrist AROM     Step 15F left, 10F right 15F left, 10F right      Wall Push-ups   Deep H20/     SLS   Bike 3m 3m   Breast Stroke on Noodle   Hip abd/add     Noodle Twist   Hip flex/ext     Noodle Push down   Hip IR/ER     Kickboard push/pull   Knee flex/ext        Push/pull on [employer] 5m   Other:    Specific Instructions for next treatment:dynamic ex, deep water hip abd/add    Treatment Charges: Mins Units   []  Modalities     []  Ther Exercise     []  Manual Therapy     []  Ther Activities     [x]  Aquatics 30 2   []  Other       Assessment: [x] [facility]ing toward goals. Better dinora to ex today with less pain overall and increased reps static ex. Added to dynamic ex with marching. Pt notes discomfort R knee during step ups but not as bad as last visit. Pt notes knee feels \"shifty\". L knee with discomfort medial side but less than last time. Knees feels sore during deep water bike, so decreased ROM with better dinora. Pt feels sore in knees after tx. Will cont to monitor sx.    [] No change. [] Other:  STG: (to be met in 10 treatments)  1. ? Pain:<3/10 R hip with all activity  2. ? ROM:Normal R hip extension  3. ? Strength:5/5 hip strength to improve ease of ADLs  4. ? Function:Able to walk with 1 forearm crutch x30 min with <3/10 pain R hip  5. Independent with Home Exercise Programs     LTG: (to be met in 20 treatments)  1. No pain R hip  2. Able to squat with proper form to be able to perform daily tasks more easily  3. LEFI score <15% disability  Able to walk 30 min painfree with least restrictive device    Pt.  Education:  [x] Yes  [] No  [] Reviewed Prior HEP/Ed  Method of Education: [x] Verbal  [] Demo  [] Written  Comprehension of Education:  [x] Verbalizes understanding. [] Demonstrates understanding. [] Needs review. [] Demonstrates/verbalizes HEP/Ed previously given. Plan: [x] Continue per plan of care.    [] Other:      Time In:1:05pm            Time Out: 1:45  pm    Electronically signed by:  Reuben Avila PTA

## 2020-10-28 ENCOUNTER — HOSPITAL ENCOUNTER (OUTPATIENT)
Dept: PHYSICAL THERAPY | Facility: CLINIC | Age: 20
Setting detail: THERAPIES SERIES
Discharge: HOME | End: 2020-10-28
Payer: COMMERCIAL

## 2020-10-28 PROCEDURE — 97113 AQUATIC THERAPY/EXERCISES: CPT

## 2020-10-28 NOTE — FLOWSHEET NOTE
[] [facility]       Physical Therapy [facility]       [place] [address]        [address]       Phone: [phone]       Fax: [phone] [] [facility]       Promotion at [address] [place] [address]       Phone: [phone]       Fax: [phone] [] [place]. [facility] Promotion  [address]   Phone: [phone]   Fax:  [phone]     Physical Therapy Daily  Aquatic Treatment Note    Date:  10/28/2020  Patient Name:  Dali Floyd"   :  2000  MRN: [id number]  Physician: Dr. Khadijah Campos: Tone Fonseca Diagnosis:   R hip pain                    Rehab Codes: M25.551  Onset date:    10/14/20                                              Next Dr's appt.: unknow    Visit# / total visits:   Cancels/No Shows:     Subjective:    Pain:  [] Yes  [x] No Location: bilat knees Pain Rating: (0-10 scale) 0/10  Pain altered Tx:  [x] No  [] Yes  Action:  Comments:Pt reports no pain today and \"no problems\" after last aquatic session. Pt is sore in hip muscles from HEP given on first visit, but feels this is normal for starting new ex.     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     KEY  B = Belt G = Gloves N = Noodle   C = Cuffs K = Kickboard P = Paddles   CC = Cervical Collar L = Laps T = Theratube   DB = Dumbells M = Minutes W = Weights     Exercises/Activities  Warm-up/Amb 10/21 10/26 10/28 Dynamic Exercises 10/21 10/26 10/28   Forward 3L 3L 3L March  3L 3L   Sideways 3L 3L 3L Squat      Backwards 3L 3L 3L Retro HS curls          Retro SLR      Stretches    Braiding      Gastroc/Soleus    Heel to Toe amb      Hamstring    Toe amb      Hip flexor    Heel amb      Piriformis          SKTC          Pec Stretch          Post Deltoid    Static Exercises UE          Shoulder flex/ext      Static Exercises LE    Shoulder abd/add      Heel/toe raises 15 15 15 Shoulder H.  abd/add      Marches 15 15 15 Shoulder IR/ER      Mini-squats 10 15 15 Rowing      4-way hip  10 abd/ext 15 abd/ext 15abd/ext Arm Circles      Hamstring curls 10 15 15 UT shrugs/rolls      Hip Circles/Fig 8    Scap squeezes      Ankle ROM    Diagonals 1/2      Lunges     Elbow flex/ext          Pron/Sup      Functional Exercise    Wrist AROM      Step 15F left, 10F right 15F left, 10F right 15F Left  12F right       Wall Push-ups    Deep H20/      SLS    Bike 3m 3m 3m   Breast Stroke on Noodle    Hip abd/add   20x   Noodle Twist    Hip flex/ext      Noodle Push down    Hip IR/ER      Kickboard push/pull    Knee flex/ext          Push/pull on [employer] 5m 5m 5m   Other:    Specific Instructions for next treatment:dynamic ex    Treatment Charges: Mins Units   []  Modalities     []  Ther Exercise     []  Manual Therapy     []  Ther Activities     [x]  Aquatics 30 2   []  Other       Assessment: [x] [facility]ing toward goals. Cont with aquatic ex per flow sheet with good dinora and increased velocity thru the water during amb. VC for posture as pt tends to look down at her feet while walking. Tolerated increased reps of step ups on R LE today and increased deep water ex. Pt notes needing to slow down her deep water biking at the end due to clicking in her knees and they felt \"shifty\". [] No change. [] Other:  STG: (to be met in 10 treatments)  1. ? Pain:<3/10 R hip with all activity  2. ? ROM:Normal R hip extension  3. ? Strength:5/5 hip strength to improve ease of ADLs  4. ? Function:Able to walk with 1 forearm crutch x30 min with <3/10 pain R hip  5. Independent with Home Exercise Programs     LTG: (to be met in 20 treatments)  1. No pain R hip  2. Able to squat with proper form to be able to perform daily tasks more easily  3. LEFI score <15% disability  Able to walk 30 min painfree with least restrictive device    Pt.  Education:  [x] Yes  [] No  [] Reviewed Prior HEP/Ed  Method of Education: [x] Verbal  [] Demo  [] Written  Comprehension of Education:  [x] Verbalizes understanding. [] Demonstrates understanding. [] Needs review. [] Demonstrates/verbalizes HEP/Ed previously given. Plan: [x] Continue per plan of care.    [] Other:      Time In:2:00pm            Time Out: 2:35   pm    Electronically signed by:  Patria Wing PTA

## 2020-11-02 ENCOUNTER — HOSPITAL ENCOUNTER (OUTPATIENT)
Dept: PHYSICAL THERAPY | Facility: CLINIC | Age: 20
Setting detail: THERAPIES SERIES
Discharge: HOME | End: 2020-11-02
Payer: COMMERCIAL

## 2020-11-02 PROCEDURE — 97113 AQUATIC THERAPY/EXERCISES: CPT

## 2020-11-02 NOTE — FLOWSHEET NOTE
[] [facility]       Physical Therapy [facility]       [place]        [address]       Phone: [phone]       Fax: [phone] [] [facility]       Promotion at [address] [facility] [address] [facility]       Phone: [phone]       Fax: [phone] [] [facility] Promotion  [address]   Phone: [phone]   Fax:  [phone]     Physical Therapy Daily  Aquatic Treatment Note    Date:  2020  Patient Name:  Hailee Royal"   :  2000  MRN: [id number]  Physician: Dr. Cardenas Rad: Ryann Abreu Diagnosis:   R hip pain                    Rehab Codes: M25.551  Onset date:    10/14/20                                              Next 's appt.: unknow    Visit# / total visits:   Cancels/No Shows:     Subjective:    Pain:  [] Yes  [x] No Location: bilat knees Pain Rating: (0-10 scale) 0/10  Pain altered Tx:  [x] No  [] Yes  Action:  Comments:Pt reports no pain today and able to walk around the store yesterday without her crutches.     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     KEY  B = Belt G = Gloves N = Noodle   C = Cuffs K = Kickboard P = Paddles   CC = Cervical Collar L = Laps T = Theratube   DB = Dumbells M = Minutes W = Weights     Exercises/Activities  Warm-up/Amb  Dynamic Exercises    Forward 3L March 3L   Sideways 3L Squat    Backwards 3L Retro HS curls 2L     Retro SLR    Stretches  Braiding    Gastroc/Soleus  Heel to Toe amb    Hamstring  Toe amb    Hip flexor  Heel amb    Piriformis      SKTC      Pec Stretch      Post Deltoid  Static Exercises UE      Shoulder flex/ext    Static Exercises LE  Shoulder abd/add    Heel/toe raises 15 Shoulder H.  abd/add    Marches 15 Shoulder IR/ER    Mini-squats 15 Rowing    4-way hip  15abd/ext Arm Circles    Hamstring curls 15 UT shrugs/rolls    Hip Circles/Fig 8  Scap squeezes    Ankle ROM  Diagonals 1/2    Lunges   Elbow flex/ext      Pron/Sup    Functional Exercise  Wrist AROM    Step 15F Left  15F right     Wall Push-ups  Deep H20/    SLS  Bike 3m   Breast Stroke on Noodle  Hip abd/add 20x   Noodle Twist  Hip flex/ext    Noodle Push down  Hip IR/ER    Kickboard push/pull  Knee flex/ext      Push/pull on [employer] 5m   Other:    Specific Instructions for next treatment:    Treatment Charges: Mins Units   []  Modalities     []  Ther Exercise     []  Manual Therapy     []  Ther Activities     [x]  Aquatics 30 2   []  Other       Assessment: [x] [facility]ing toward goals. Cont with aquatic ex per flow sheet with good dinora and cont to move thru the water with more ease than previous. Able to add to dynamic ex with fair dinora. Dinora 15 reps bilat of step ups today but notes soreness in L knee after. Pt feels okay after tx with min c/o of discomfort. Will cont to monitor and progress as dinora. [] No change. [] Other:  STG: (to be met in 10 treatments)  1. ? Pain:<3/10 R hip with all activity  2. ? ROM:Normal R hip extension  3. ? Strength:5/5 hip strength to improve ease of ADLs  4. ? Function:Able to walk with 1 forearm crutch x30 min with <3/10 pain R hip  5. Independent with Home Exercise Programs     LTG: (to be met in 20 treatments)  1. No pain R hip  2. Able to squat with proper form to be able to perform daily tasks more easily  3. LEFI score <15% disability  Able to walk 30 min painfree with least restrictive device    Pt. Education:  [x] Yes  [] No  [] Reviewed Prior HEP/Ed  Method of Education: [x] Verbal  [] Demo  [] Written  Comprehension of Education:  [x] Verbalizes understanding. [] Demonstrates understanding. [] Needs review. [] Demonstrates/verbalizes HEP/Ed previously given. Plan: [x] Continue per plan of care.    [] Other:      Time In:1:45pm            Time Out: 2:25   pm    Electronically signed by:  Ralph Willson PTA

## 2020-11-03 ENCOUNTER — HOSPITAL ENCOUNTER (OUTPATIENT)
Dept: GENERAL RADIOLOGY | Age: 20
Discharge: HOME | End: 2020-11-05
Payer: COMMERCIAL

## 2020-11-03 ENCOUNTER — HOSPITAL ENCOUNTER (OUTPATIENT)
Dept: MRI IMAGING | Age: 20
Discharge: HOME | End: 2020-11-05
Payer: COMMERCIAL

## 2020-11-03 PROCEDURE — 27093 INJECTION FOR HIP X-RAY: CPT

## 2020-11-03 PROCEDURE — 6360000004 HC RX CONTRAST MEDICATION: Performed by: ORTHOPAEDIC SURGERY

## 2020-11-03 PROCEDURE — 73722 MRI JOINT OF LWR EXTR W/DYE: CPT

## 2020-11-03 PROCEDURE — 73525 CONTRAST X-RAY OF HIP: CPT

## 2020-11-03 PROCEDURE — A9579 GAD-BASE MR CONTRAST NOS,1ML: HCPCS | Performed by: ORTHOPAEDIC SURGERY

## 2020-11-03 RX ADMIN — GADOTERIDOL 1 MMOL: 279.3 INJECTION, SOLUTION INTRAVENOUS at 13:28

## 2020-11-03 RX ADMIN — IOHEXOL 20 ML: 240 INJECTION, SOLUTION INTRATHECAL; INTRAVASCULAR; INTRAVENOUS; ORAL at 13:28

## 2020-11-05 ENCOUNTER — HOSPITAL ENCOUNTER (OUTPATIENT)
Dept: PHYSICAL THERAPY | Facility: CLINIC | Age: 20
Setting detail: THERAPIES SERIES
Discharge: HOME | End: 2020-11-05
Payer: COMMERCIAL

## 2020-11-05 PROCEDURE — 97113 AQUATIC THERAPY/EXERCISES: CPT

## 2020-11-05 NOTE — FLOWSHEET NOTE
[] [facility]       Physical Therapy [facility]       [place] [address]        [address]       Phone: [phone]       Fax: [phone] [] [facility]       Promotion at [address] [facility]       Phone: [phone]       Fax: [phone] [] [place] Promotion  [address]   Phone: [phone]   Fax:  [phone]     Physical Therapy Daily  Aquatic Treatment Note    Date:  2020  Patient Name:  Darian Pierson"   :  2000  MRN: [id number]  Physician: Dr. Carlos Bob: Mika Gay Diagnosis:   R hip pain                    Rehab Codes: M25.551  Onset date:    10/14/20                                              Next Dr's appt.: unknow    Visit# / total visits:   Cancels/No Shows:     Subjective:    Pain:  [] Yes  [x] No Location: R hip Pain Rating: (0-10 scale) 4/10  Pain altered Tx:  [x] No  [] Yes  Action:  Comments: Pt reports he recently had imaging done and has labral tear in R hip. Is waiting for follow up appt with Dr. Kira Mcgraw.      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     KEY  B = Belt G = Gloves N = Noodle   C = Cuffs K = Kickboard P = Paddles   CC = Cervical Collar L = Laps T = Theratube   DB = Dumbells M = Minutes W = Weights     Exercises/Activities  Warm-up/Amb  Dynamic Exercises    Forward 3L 3L March 3L 3L   Sideways 3L 3L Squat     Backwards 3L 3L Retro HS curls 2L 3L      Retro SLR     Stretches   Braiding     Gastroc/Soleus   Heel to Toe amb     Hamstring   Toe amb     Hip flexor   Heel amb     Piriformis        SKTC        Pec Stretch        Post Deltoid   Static Exercises UE        Shoulder flex/ext     Static Exercises LE   Shoulder abd/add     Heel/toe raises 15 15 Shoulder H.  abd/add     Marches 15 15 Shoulder IR/ER     Mini-squats 15 15 Rowing     4-way

## 2020-11-09 ENCOUNTER — HOSPITAL ENCOUNTER (OUTPATIENT)
Dept: PHYSICAL THERAPY | Facility: CLINIC | Age: 20
Setting detail: THERAPIES SERIES
Discharge: HOME | End: 2020-11-09
Payer: COMMERCIAL

## 2020-11-09 PROCEDURE — 97113 AQUATIC THERAPY/EXERCISES: CPT

## 2020-11-09 NOTE — FLOWSHEET NOTE
[] [facility]       Physical Therapy M[facility]2       [place]        [address]       Phone: [phone]       Fax: [phone] [] [facility]       Promotion at [address] [facility]       Phone: [phone]       Fax: [phone] [] [place] Promotion  [address]   Phone: [phone]   Fax:  [phone]     Physical Therapy Daily  Aquatic Treatment Note    Date:  2020  Patient Name:  Panfilo Craft"   :  2000  MRN: [id number]  Physician: Dr. Lennon Labrador: Shanelle Muñiz Diagnosis:   R hip pain                    Rehab Codes: M25.551  Onset date:    10/14/20                                              Next Dr's appt.: unknow    Visit# / total visits:   Cancels/No Shows:     Subjective:    Pain:  [] Yes  [x] No Location: R hip Pain Rating: (0-10 scale) 4/10  Pain altered Tx:  [x] No  [] Yes  Action:  Comments: Pt reports cont pain in R hip today and slight pain L hip as well. Pt started a job part time that accomodates seated position.       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     KEY  B = Belt G = Gloves N = Noodle   C = Cuffs K = Kickboard P = Paddles   CC = Cervical Collar L = Laps T = Theratube   DB = Dumbells M = Minutes W = Weights     Exercises/Activities  Warm-up/Amb  Dynamic Exercises    Forward 3L 3L 3L March 3L 3L 3L   Sideways 3L 3L 3L Squat      Backwards 3L 3L 3L Retro HS curls 2L 3L 2L       Retro SLR      Stretches    Braiding      Gastroc/Soleus    Heel to Toe amb      Hamstring    Toe amb      Hip flexor    Heel amb      Piriformis          SKTC          Pec Stretch          Post Deltoid    Static Exercises UE          Shoulder flex/ext      Static Exercises LE    Shoulder abd/add      Heel/toe raises 15 15 15 Shoulder H.  abd/add previously given. Plan: [x] Continue per plan of care.    [] Other:      Time In: 2:28pm            Time Out: 3:30pm    Electronically signed by:  Sahra Lynn PTA

## 2020-11-13 ENCOUNTER — HOSPITAL ENCOUNTER (OUTPATIENT)
Dept: PHYSICAL THERAPY | Facility: CLINIC | Age: 20
Setting detail: THERAPIES SERIES
Discharge: HOME | End: 2020-11-13
Payer: COMMERCIAL

## 2020-11-13 PROCEDURE — 97113 AQUATIC THERAPY/EXERCISES: CPT

## 2020-11-13 NOTE — FLOWSHEET NOTE
Mini-squats 15 Rowing    4-way hip  15 L, 3 R abd/ext Arm Circles    Hamstring curls 15 UT shrugs/rolls    Hip Circles/Fig 8  Scap squeezes    Ankle ROM  Diagonals 1/2    Lunges   Elbow flex/ext      Pron/Sup    Functional Exercise  Wrist AROM    Step 15F     Wall Push-ups  Deep H20/    SLS  Bike 3m   Breast Stroke on Noodle  Hip abd/add 20x   Noodle Twist  Hip flex/ext    Noodle Push down  Hip IR/ER    Kickboard push/pull  Knee flex/ext      Push/pull on [employer] 5m   Other:    Specific Instructions for next treatment:    Treatment Charges: Mins Units   []  Modalities     []  Ther Exercise     []  Manual Therapy     []  Ther Activities     [x]  Aquatics 30 2   []  Other       Assessment: [x] [facility]ing toward goals. Continued with ex per log above with fair dinora. Pt with pain R hip with ext today and  Could only dinora 3 reps before pain increased. Cont pain L ankle during retro walking laps. Pt feels sore after tx, but not too bad. Has follow up with Dr. Bessie Lemon next week. [] No change. [] Other:  STG: (to be met in 10 treatments)  1. ? Pain:<3/10 R hip with all activity  2. ? ROM:Normal R hip extension  3. ? Strength:5/5 hip strength to improve ease of ADLs  4. ? Function:Able to walk with 1 forearm crutch x30 min with <3/10 pain R hip  5. Independent with Home Exercise Programs     LTG: (to be met in 20 treatments)  1. No pain R hip  2. Able to squat with proper form to be able to perform daily tasks more easily  3. LEFI score <15% disability  Able to walk 30 min painfree with least restrictive device    Pt. Education:  [x] Yes  [] No  [] Reviewed Prior HEP/Ed  Method of Education: [x] Verbal  [] Demo  [] Written  Comprehension of Education:  [x] Verbalizes understanding. [] Demonstrates understanding. [] Needs review. [] Demonstrates/verbalizes HEP/Ed previously given. Plan: [x] Continue per plan of care.    [] Other:      Time In: 2:05pm            Time Out: 2:45pm    Electronically signed by:  Garcia Olson, PTA

## 2020-11-16 ENCOUNTER — HOSPITAL ENCOUNTER (OUTPATIENT)
Dept: PHYSICAL THERAPY | Facility: CLINIC | Age: 20
Setting detail: THERAPIES SERIES
Discharge: HOME | End: 2020-11-16
Payer: COMMERCIAL

## 2020-11-16 PROCEDURE — 97113 AQUATIC THERAPY/EXERCISES: CPT

## 2020-11-16 NOTE — FLOWSHEET NOTE
[] [facility]       Physical Therapy [facility]       [facility] [address]        [address]       Phone: [phone]       Fax: [phone] [] [facility]       Promotion at [address] [facility]       Phone: [phone]       Fax: [phone] [] [place] [address] [place] [address]  [address] [facility]   Phone: [phone]   Fax:  [phone]     Physical Therapy Daily  Aquatic Treatment Note    Date:  2020  Patient Name:  Mary Ann Lowe"   :  2000  MRN: [id number]  Physician: Dr. Lucinda Thompson: Cas Garcia Diagnosis:   R hip pain                    Rehab Codes: M25.551  Onset date:    10/14/20                                              Next 's appt.: 20 follow up Dr. Nedra Alamo    Visit# / total visits:   Cancels/No Shows:     Subjective:    Pain:  [] Yes  [x] No Location: R hip, R knee Pain Rating: (0-10 scale) 2/10 R knee  Pain altered Tx:  [x] No  [] Yes  Action:  Comments: Pt reports not working this morning and much less pain today. Pt notes the training for her new job was hard on her physically, but her actual job is better.     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     KEY  B = Belt G = Gloves N = Noodle   C = Cuffs K = Kickboard P = Paddles   CC = Cervical Collar L = Laps T = Theratube   DB = Dumbells M = Minutes W = Weights     Exercises/Activities  Warm-up/Amb  Dynamic Exercises    Forward 3L March 3L   Sideways 3L Squat    Backwards 3L Retro HS curls 3L     Retro SLR    Stretches  Braiding    Gastroc/Soleus  Heel to Toe amb    Hamstring  Toe amb    Hip flexor  Heel amb    Piriformis      SKTC      Pec Stretch      Post Deltoid  Static Exercises UE      Shoulder flex/ext    Static Exercises LE  Shoulder abd/add    Heel/toe raises 15 Shoulder H.  abd/add    Marches 15 Shoulder IR/ER Mini-squats 15 Rowing    4-way hip  15 bilat abd/ext Arm Circles    Hamstring curls 15 UT shrugs/rolls    Hip Circles/Fig 8  Scap squeezes    Ankle ROM  Diagonals 1/2    Lunges   Elbow flex/ext      Pron/Sup    Functional Exercise  Wrist AROM    Step 10F     Wall Push-ups  Deep H20/    SLS  Bike 3m   Breast Stroke on Noodle  Hip abd/add 20x   Noodle Twist  Hip flex/ext    Noodle Push down  Hip IR/ER    Kickboard push/pull  Knee flex/ext      Push/pull on [employer] 5m   Other:    Specific Instructions for next treatment:    Treatment Charges: Mins Units   []  Modalities     []  Ther Exercise     []  Manual Therapy     []  Ther Activities     [x]  Aquatics 30 2   []  Other       Assessment: [x] [facility]ing toward goals. Continued with ex per log above with better dinora this date. Able to resume retro HS curls to 3 laps with min pain noted after. Calf raise bothered her R ankle and great toe today. Step ups bothered her knees so reduced reps. Good dinora to deep water ex today. Pt feels sore mostly in knees after tx. Pt to follow up with Surgeon this week. [] No change. [] Other:  STG: (to be met in 10 treatments)  1. ? Pain:<3/10 R hip with all activity  2. ? ROM:Normal R hip extension  3. ? Strength:5/5 hip strength to improve ease of ADLs  4. ? Function:Able to walk with 1 forearm crutch x30 min with <3/10 pain R hip  5. Independent with Home Exercise Programs     LTG: (to be met in 20 treatments)  1. No pain R hip  2. Able to squat with proper form to be able to perform daily tasks more easily  3. LEFI score <15% disability  Able to walk 30 min painfree with least restrictive device    Pt. Education:  [x] Yes  [] No  [] Reviewed Prior HEP/Ed  Method of Education: [x] Verbal  [] Demo  [] Written  Comprehension of Education:  [x] Verbalizes understanding. [] Demonstrates understanding. [] Needs review. [] Demonstrates/verbalizes HEP/Ed previously given.      Plan: [x] Continue per plan of care.   [] Other:      Time In: 2:05pm            Time Out: 2:45pm    Electronically signed by:  Shira Saleh PTA

## 2020-11-18 ENCOUNTER — OFFICE VISIT (OUTPATIENT)
Dept: ORTHOPEDIC SURGERY | Age: 20
End: 2020-11-18
Payer: COMMERCIAL

## 2020-11-18 VITALS — WEIGHT: 128 LBS | HEIGHT: 68 IN | BODY MASS INDEX: 19.4 KG/M2

## 2020-11-18 PROCEDURE — 99213 OFFICE O/P EST LOW 20 MIN: CPT | Performed by: ORTHOPAEDIC SURGERY

## 2020-11-18 ASSESSMENT — ENCOUNTER SYMPTOMS
COUGH: 0
CONSTIPATION: 0
NAUSEA: 0
DIARRHEA: 0

## 2020-11-18 NOTE — PROGRESS NOTES
[place] [address] Medical [facility] AND SPORTS MEDICINE  [address] [place] [id number]  Dept: [phone]  Dept Fax: [phone]        Ambulatory Follow Up      Subjective:   Nelson Martini is a 21y.o. year old adult who presents to our office today for routine followup regarding his   1. Right hip pain    . Chief Complaint   Patient presents with    Follow-up     MRI results of right hip         HPI- Patient presents to office today for follow up on her right hip. Patient was last seen on on 9/24/2020 and underwent a MRI arthrogram of her right hip. Patient continues to have pain to her right hip. Patient was sent under pool therapy at last visit and she does say that therapy is helping the pain but not so much with her right hip function. Patient states that he has had pain to the right hip since he was 6years old. The patient states that there was no injuries to the hips. He has pain with long distance walking. The patient states that he has been diagnosed with a connective tissue disorder and has his right hip dislocate often. Patient has a history of Christen-Danlos syndrome, Spinal instability of cervical region, Syringomyelia. Review of Systems   Constitutional: Negative for chills and fever. Respiratory: Negative for cough. Gastrointestinal: Negative for constipation, diarrhea and nausea. Musculoskeletal: Positive for arthralgias (right hip). Negative for gait problem, joint swelling and myalgias. Neurological: Negative for dizziness, weakness and numbness. I have reviewed the CC, HPI, ROS, PMH, FHX, Social History, and if not present in this note, I have reviewed in the patient's chart. I agree with the documentation provided by other staff and have reviewed their documentation prior to providing my signature indicating agreement.     Objective :   Ht 5' 8\" (1.727 m)   Wt 128 lb (58.1 kg)   BMI 19.46 kg/m²  Body mass index is 19.46 Patient resting comfortably in bed  VS stable  Will continue to monitor  kg/m². General: Esequiel Ordaz is a 21 y.o. adult who is alert and oriented and sitting comfortably in our office. Ortho Exam  MS:  Significant in-dane on the right. Positive DARSHAN on the right is appreciated. No gross instability of the bilateral hips is appreciated. Motor, sensory, vascular examination to the right lower extremity is grossly intact without focal deficits. Neuro: alert and oriented to person and place. Eyes: Extra-ocular muscles intact  Mouth: Oral mucosa moist. No perioral lesions  Pulm: Respirations unlabored and regular. Symmetric chest excursion without outward deformity is noted. Skin: warm, well perfused  Psych:   Patient has good fund of knowledge and displays understanging of exam, diagnosis, and plan. Radiology:     Mri Hip Right W Contrast    Result Date: 11/3/2020  EXAMINATION: MRI ARTHROGRAM OF THE RIGHT HIP, 11/3/2020 1:41 pm TECHNIQUE: Multiplanar multisequence MRI of the right hip was performed after the administration of intra-articular contrast. 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 Acuity: Unknown Type of Exam: Initial Additional signs and symptoms: Right hip pain FINDINGS: BONE MARROW: No acute fracture. No suspicious bone marrow replacing lesion. HIP JOINT: Intra-articular contrast injection. Partial-thickness cartilage fissure along the superior aspect of the acetabulum. LABRUM: Complex tearing of the anterior-superior labrum possibly with a small paralabral cyst. BURSAE: Within normal limits. SCIATIC NERVE: No mass or lesion along the visualized portions of the sciatic nerve. MUSCLES / TENDONS: Muscles and tendons surrounding the right hip are intact. INTRAPELVIC CONTENTS / SOFT TISSUES: Small amount of free fluid.   Multiple right ovarian

## 2020-11-19 ENCOUNTER — HOSPITAL ENCOUNTER (OUTPATIENT)
Dept: PHYSICAL THERAPY | Facility: CLINIC | Age: 20
Setting detail: THERAPIES SERIES
Discharge: HOME | End: 2020-11-19
Payer: COMMERCIAL

## 2020-11-19 ENCOUNTER — VIRTUAL VISIT (OUTPATIENT)
Dept: FAMILY MEDICINE CLINIC | Age: 20
End: 2020-11-19
Payer: COMMERCIAL

## 2020-11-19 PROCEDURE — 97113 AQUATIC THERAPY/EXERCISES: CPT

## 2020-11-19 RX ORDER — BACLOFEN 10 MG/1
10 TABLET ORAL 2 TIMES DAILY
Qty: 60 TABLET | Refills: 3 | Status: SHIPPED | OUTPATIENT
Start: 2020-11-19 | End: 2021-04-12 | Stop reason: SDUPTHER

## 2020-11-19 RX ORDER — DULOXETIN HYDROCHLORIDE 30 MG/1
30 CAPSULE, DELAYED RELEASE ORAL 2 TIMES DAILY
Qty: 30 CAPSULE | Refills: 3 | Status: SHIPPED | OUTPATIENT
Start: 2020-11-19 | End: 2020-11-19

## 2020-11-19 RX ORDER — LAMOTRIGINE 100 MG/1
100 TABLET ORAL DAILY
Qty: 30 TABLET | Refills: 2 | Status: SHIPPED | OUTPATIENT
Start: 2020-11-19 | End: 2021-03-15

## 2020-11-19 RX ORDER — DULOXETIN HYDROCHLORIDE 30 MG/1
30 CAPSULE, DELAYED RELEASE ORAL 2 TIMES DAILY
Qty: 60 CAPSULE | Refills: 3 | Status: SHIPPED | OUTPATIENT
Start: 2020-11-19 | End: 2021-03-24 | Stop reason: SDUPTHER

## 2020-11-19 RX ORDER — BUSPIRONE HYDROCHLORIDE 10 MG/1
10 TABLET ORAL 2 TIMES DAILY
Qty: 60 TABLET | Refills: 2 | Status: SHIPPED | OUTPATIENT
Start: 2020-11-19 | End: 2021-03-09

## 2020-11-19 RX ORDER — BUSPIRONE HYDROCHLORIDE 10 MG/1
10 TABLET ORAL 2 TIMES DAILY
Qty: 120 TABLET | Refills: 2 | Status: CANCELLED | OUTPATIENT
Start: 2020-11-19

## 2020-11-19 RX ORDER — DULOXETIN HYDROCHLORIDE 30 MG/1
30 CAPSULE, DELAYED RELEASE ORAL 2 TIMES DAILY
Qty: 120 CAPSULE | Refills: 2 | Status: CANCELLED | OUTPATIENT
Start: 2020-11-19

## 2020-11-19 NOTE — PROGRESS NOTES
[address]  [address]  Dept: [phone]  Dept Fax: [phone]  Loc: [phone]     Visit Date:  11/19/2020    Patient:  Nicholas Duncan  YOB: 2000    HPI:   Nicholas Duncan presents today for   Chief Complaint   Patient presents with    Medication Refill     patient would like to discuss getting refills on a medication previously prescribed by her old pcp    . AUDIO/VIDEO CALL DUE TO COVID 23   HPI 21year old patient with a history of hypermobile Christen-Danlos syndrome associated with chronic muscle aches and pains, instability of her joints, hypermobility of her joints as well as postural orthostatic tachycardia syndrome, chronic constipation and possible gastroparesis is calling regarding refills on her meds. Anxiety/Depression/Mood disorder-stable and she is requesting refills on her medications including BuSpar as well as Lamictal and duloxetine. He denies any side effects with the medication. Chronic bilateral Hip Pain R>Left/ hip pain/Right labral tear/instability of hips/Christen Danlos syndrome-he started doing physical therapy aqua therapy for the past several months she feels like it is helping a little . Multiple dislocations in the past gait instability chronic hip pain for several years now. She uses crutches intermittently. She has been seeing orthopedic surgeon Dr Tayla Marion. Syringomyelia around T5-T6 area. -Will need repeat imaging January of 2021. Menorrhagia/dysmenorrhea/family history of endometriosis. Planning to get IUD placed on December 9 and will get off the hormonal pills then. Medications  Prior to Visit Medications    Medication Sig Taking?  Authorizing Provider   norethindrone (AYGESTIN) 5 MG tablet Take 0.35 mg by mouth daily  Historical Provider, MD   lamoTRIgine (LAMICTAL) 100 MG tablet Take 100 mg by mouth daily Take one daily  Historical Provider, MD   DULoxetine (CYMBALTA) 30 MG extended release capsule Take 30 mg by mouth 2 times daily Take 1 pill twice daily  Historical Provider, MD   busPIRone (BUSPAR) 10 MG tablet 10 mg 2 times daily Take one pill twice daily  Historical Provider, MD   fludrocortisone (FLORINEF) 0.1 MG tablet Take 0.1 mg by mouth daily Take one pill once daily  Historical Provider, MD   loratadine (CLARITIN) 10 MG capsule Take 10 mg by mouth daily  Historical Provider, MD   baclofen (LIORESAL) 10 MG tablet Take 1 tablet by mouth 2 times daily  Jeremiah Brush MD        Allergies:  is allergic to reglan [metoclopramide]; corn-containing products; abilify [aripiprazole]; and aloe vera. Past Medical History:   has a past medical history of Anxiety, Autism spectrum disorder, Bipolar disorder ([facility] [address] Depression, Dysmenorrhea, Christen-Danlos syndrome type III, GERD (gastroesophageal reflux disease), Headache, Menorrhagia, Postural orthostatic tachycardia syndrome, Syringomyelia ([facility] [address] and Urinary incontinence. Past Surgical History   has a past surgical history that includes Cholecystectomy and [place] tooth extraction (2019). Family History  family history includes Alcohol Abuse in his maternal grandfather and paternal uncle; Dementia in his maternal grandmother; Depression in his father and mother; Heart Attack in his maternal grandfather, maternal uncle, and paternal grandfather; Heart Disease in his maternal grandfather, maternal uncle, mother, paternal grandfather, and sister; High Cholesterol in his maternal grandfather and mother; Learning Disabilities in his brother; Mental Illness in his brother, father, mother, paternal aunt, and paternal uncle; Paticia [place] / [place] in his mother; Other in his mother; Substance Abuse in his paternal aunt, paternal cousin, and paternal uncle. Social History   reports that he has never smoked.  He has never used smokeless tobacco. He reports that he does not drink alcohol or use drugs. Health Maintenance:    Health Maintenance   Topic Date Due    Varicella vaccine (1 of 2 - 2-dose childhood series) 09/24/2001    HPV vaccine (1 - 2-dose series) 09/24/2011    HIV screen  09/24/2015    Chlamydia screen  09/24/2016    DTaP/Tdap/Td vaccine (1 - Tdap) 09/24/2019    Flu vaccine  Completed    Hepatitis A vaccine  Aged Out    Hepatitis B vaccine  Aged Out    Hib vaccine  Aged Out    Meningococcal (ACWY) vaccine  Aged Out    Pneumococcal 0-64 years Vaccine  Aged Out       Subjective:      Review of Systems   Constitutional: Negative for fatigue, fever and unexpected weight change. HENT: Negative for ear pain, postnasal drip, rhinorrhea, sinus pain, sore throat and trouble swallowing. Eyes: Negative for visual disturbance. Respiratory: Negative for cough, chest tightness and shortness of breath. Cardiovascular: Negative for chest pain and leg swelling. Gastrointestinal: Negative for abdominal pain, blood in stool and diarrhea. Endocrine: Negative for polyuria. Genitourinary: Negative for difficulty urinating and flank pain. Musculoskeletal: Positive for arthralgias, gait problem and myalgias. Negative for joint swelling. Skin: Negative for rash. Allergic/Immunologic: Negative for environmental allergies. Neurological: Negative for weakness, light-headedness, numbness and headaches. Hematological: Negative for adenopathy. Psychiatric/Behavioral: Negative for behavioral problems and suicidal ideas. The patient is not nervous/anxious. Objective: There were no vitals taken for this visit. Physical Exam        Assessment       Diagnosis Orders   1. Anxiety  busPIRone (BUSPAR) 10 MG tablet    lamoTRIgine (LAMICTAL) 100 MG tablet    DULoxetine (CYMBALTA) 30 MG extended release capsule    DISCONTINUED: DULoxetine (CYMBALTA) 30 MG extended release capsule   2.  Tear of right acetabular labrum, subsequent encounter  busPIRone (BUSPAR) 10 MG tablet    lamoTRIgine (LAMICTAL) 100 MG tablet    DULoxetine (CYMBALTA) 30 MG extended release capsule    DISCONTINUED: DULoxetine (CYMBALTA) 30 MG extended release capsule   3. Christen-Danlos syndrome  busPIRone (BUSPAR) 10 MG tablet    lamoTRIgine (LAMICTAL) 100 MG tablet    DULoxetine (CYMBALTA) 30 MG extended release capsule    DISCONTINUED: DULoxetine (CYMBALTA) 30 MG extended release capsule   4. POTS (postural orthostatic tachycardia syndrome)  busPIRone (BUSPAR) 10 MG tablet    lamoTRIgine (LAMICTAL) 100 MG tablet    DULoxetine (CYMBALTA) 30 MG extended release capsule    DISCONTINUED: DULoxetine (CYMBALTA) 30 MG extended release capsule   5. Syringomyelia (HCC)  busPIRone (BUSPAR) 10 MG tablet    lamoTRIgine (LAMICTAL) 100 MG tablet    DULoxetine (CYMBALTA) 30 MG extended release capsule    DISCONTINUED: DULoxetine (CYMBALTA) 30 MG extended release capsule   6. Dysmenorrhea  busPIRone (BUSPAR) 10 MG tablet    lamoTRIgine (LAMICTAL) 100 MG tablet    DULoxetine (CYMBALTA) 30 MG extended release capsule    DISCONTINUED: DULoxetine (CYMBALTA) 30 MG extended release capsule   7. Endometriosis  busPIRone (BUSPAR) 10 MG tablet    lamoTRIgine (LAMICTAL) 100 MG tablet    DULoxetine (CYMBALTA) 30 MG extended release capsule    DISCONTINUED: DULoxetine (CYMBALTA) 30 MG extended release capsule   8. Depression, unspecified depression type  busPIRone (BUSPAR) 10 MG tablet    lamoTRIgine (LAMICTAL) 100 MG tablet    DULoxetine (CYMBALTA) 30 MG extended release capsule    DISCONTINUED: DULoxetine (CYMBALTA) 30 MG extended release capsule   9. Mood problem  busPIRone (BUSPAR) 10 MG tablet    lamoTRIgine (LAMICTAL) 100 MG tablet    DULoxetine (CYMBALTA) 30 MG extended release capsule    DISCONTINUED: DULoxetine (CYMBALTA) 30 MG extended release capsule         PLAN   Refills placed on meds. Will follow up in 2-3 months.           Nita Magaña is a 21 y.o. adult being evaluated by a Virtual Visit (video visit) encounter to address concerns as mentioned above. A caregiver was present when appropriate. Due to this being a TeleHealth encounter (During BVXPK-06 public health emergency), evaluation of the following organ systems was limited: Vitals/Constitutional/EENT/Resp/CV/GI//MS/Neuro/Skin/Heme-Lymph-Imm. Pursuant to the emergency declaration under the [address] [place] [address] and the [employer] Act, this Virtual Visit was conducted with patient's (and/or legal guardian's) consent, to reduce the patient's risk of exposure to COVID-19 and provide necessary medical care. The patient (and/or legal guardian) has also been advised to contact this office for worsening conditions or problems, and seek emergency medical treatment and/or call 911 if deemed necessary. Patient identification was verified at the start of the visit: Yes    Total time spent for this encounter: 30 mins    Services were provided through a video synchronous discussion virtually to substitute for in-person clinic visit. Patient and provider were located at their individual homes. --Lacho Gallardo MD on 11/23/2020 at 4:07 PM    An electronic signature was used to authenticate this note. No orders of the defined types were placed in this encounter. No follow-ups on file. Patient given educational materials - see patient instructions. Discussed use, benefit, and side effects of prescribed medications. All patient questions answered. Pt voiced understanding. Reviewed health maintenance.        Electronically signed Mary Zuleta MD on 11/19/2020 at 3:51 PM EST

## 2020-11-19 NOTE — FLOWSHEET NOTE
[] [facility]       [facility]       [place] [address]        [address]       Phone: [phone]       Fax: [phone] [] [facility]       Promotion at [address] [facility]       Phone: [phone]       Fax: [phone] [] [place] [facility]  [address]   Phone: [phone]   Fax:  [phone]     Physical Therapy Daily  Aquatic Treatment Note    Date:  2020  Patient Name:  Sandy Smith"   :  2000  MRN: [id number]  Physician: Dr. Champ Batres: Steven Almeida Diagnosis:   R hip pain                    Rehab Codes: M25.551  Onset date:    10/14/20                                              Next 's appt.: 20 follow up Dr. Latasha Holguin    Visit# / total visits: 10/20  Cancels/No Shows:     Subjective:    Pain:  [] Yes  [x] No Location: R hip, R knee Pain Rating: (0-10 scale) 4/10 L hip  Pain altered Tx:  [x] No  [] Yes  Action:  Comments: Pt states she has been having more pain in her L hip lately. States she saw Dr. Latasha Holguin yesterday for a follow up of her MRI, he recommends she goes to [facility] or [facility].     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     KEY  B = Belt G = Gloves N = Noodle   C = Cuffs K = Kickboard P = Paddles   CC = Cervical Collar L = Laps T = Theratube   DB = Dumbells M = Minutes W = Weights     Exercises/Activities  Warm-up/Amb  Dynamic Exercises    Forward 3L 3L March 3L 3L   Sideways 3L 3L Squat     Backwards 3L 3L Retro HS curls 3L 3L      Retro SLR     Stretches   Braiding     Gastroc/Soleus   Heel to Toe amb     Hamstring   Toe amb     Hip flexor   Heel amb     Piriformis        SKTC        Pec Stretch        Post Deltoid   Static Exercises UE        Shoulder flex/ext     Static Exercises LE   Shoulder abd/add     Heel/toe raises 15 15 Shoulder H.  abd/add     Marches 15 15 Shoulder IR/ER     Mini-squats 15 15 Rowing     4-way hip  15 bilat abd/ext 15 bilat  abd/ext Arm Circles     Hamstring curls 15 15 UT shrugs/rolls     Hip Circles/Fig 8   Scap squeezes     Ankle ROM   Diagonals 1/2     Lunges    Elbow flex/ext        Pron/Sup     Functional Exercise   Wrist AROM     Step 10F 10F      Wall Push-ups   Deep H20/     SLS   Bike 3m 3m   Breast Stroke on Noodle   Hip abd/add 20x 20x   Noodle Twist   Hip flex/ext     Noodle Push down   Hip IR/ER     Kickboard push/pull   Knee flex/ext        Push/pull on [employer] 5m   Other:    Specific Instructions for next treatment:    Treatment Charges: Mins Units   []  Modalities     []  Ther Exercise     []  Manual Therapy     []  Ther Activities     [x]  Aquatics 30 2   []  Other       Assessment: [x] [facility]ing toward goals. No progressions this date d/t increased pain at arrival. Pt demonstrates good ex recall. C/o increased L ankle soreness after completing step ups. Slow with ambulation laps and completed in deeper water for less gravity on joints. Pt would like to start coming 1x per week. [] No change. [] Other:  STG: (to be met in 10 treatments)  1. ? Pain:<3/10 R hip with all activity  2. ? ROM:Normal R hip extension  3. ? Strength:5/5 hip strength to improve ease of ADLs  4. ? Function:Able to walk with 1 forearm crutch x30 min with <3/10 pain R hip  5. Independent with Home Exercise Programs     LTG: (to be met in 20 treatments)  1. No pain R hip  2. Able to squat with proper form to be able to perform daily tasks more easily  3. LEFI score <15% disability  Able to walk 30 min painfree with least restrictive device    Pt. Education:  [x] Yes  [] No  [] Reviewed Prior HEP/Ed  Method of Education: [x] Verbal  [] Demo  [] Written  Comprehension of Education:  [x] Verbalizes understanding. [] Demonstrates understanding. [] Needs review.   [] Demonstrates/verbalizes HEP/Ed previously given. Plan: [x] Continue per plan of care.    [] Other:      Time In: 8:50 am           Time Out: 9:40 am    Electronically signed by:  Tamar Echavarria PTA

## 2020-11-19 NOTE — TELEPHONE ENCOUNTER
Nelson Martini is requesting a refill on the following medication(s):  Requested Prescriptions     Pending Prescriptions Disp Refills    busPIRone (BUSPAR) 10 MG tablet 120 tablet 2     Sig: Take 1 tablet by mouth 2 times daily Take one pill twice daily    baclofen (LIORESAL) 10 MG tablet 60 tablet 3     Sig: Take 1 tablet by mouth 2 times daily    DULoxetine (CYMBALTA) 30 MG extended release capsule 120 capsule 2     Sig: Take 1 capsule by mouth 2 times daily Take 1 pill twice daily       Last Visit Date (If Applicable):  8/30/3729    Next Visit Date:    11/19/2020

## 2020-11-23 ASSESSMENT — ENCOUNTER SYMPTOMS
RHINORRHEA: 0
SINUS PAIN: 0
TROUBLE SWALLOWING: 0
CHEST TIGHTNESS: 0
SORE THROAT: 0
COUGH: 0
SHORTNESS OF BREATH: 0
DIARRHEA: 0
ABDOMINAL PAIN: 0
BLOOD IN STOOL: 0

## 2020-11-30 ENCOUNTER — TELEPHONE (OUTPATIENT)
Dept: ORTHOPEDIC SURGERY | Age: 20
End: 2020-11-30

## 2021-01-15 RX ORDER — FLUDROCORTISONE ACETATE 0.1 MG/1
0.1 TABLET ORAL DAILY
Qty: 90 TABLET | Refills: 3 | Status: SHIPPED | OUTPATIENT
Start: 2021-01-15 | End: 2022-01-18

## 2021-01-15 NOTE — TELEPHONE ENCOUNTER
Elena James is requesting a refill on the following medication(s):  Requested Prescriptions     Pending Prescriptions Disp Refills    fludrocortisone (FLORINEF) 0.1 MG tablet 90 tablet 3     Sig: Take 1 tablet by mouth daily Take one pill once daily       Last Visit Date (If Applicable):  Visit date not found    Next Visit Date:    Visit date not found

## 2021-02-19 ENCOUNTER — OFFICE VISIT (OUTPATIENT)
Dept: FAMILY MEDICINE CLINIC | Age: 21
End: 2021-02-19
Payer: COMMERCIAL

## 2021-02-19 VITALS
BODY MASS INDEX: 19.8 KG/M2 | OXYGEN SATURATION: 99 % | HEART RATE: 88 BPM | SYSTOLIC BLOOD PRESSURE: 116 MMHG | DIASTOLIC BLOOD PRESSURE: 68 MMHG | WEIGHT: 130.2 LBS

## 2021-02-19 DIAGNOSIS — Z01.818 PRE-OP EVALUATION: Primary | ICD-10-CM

## 2021-02-19 PROCEDURE — 99214 OFFICE O/P EST MOD 30 MIN: CPT | Performed by: INTERNAL MEDICINE

## 2021-02-19 PROCEDURE — 93000 ELECTROCARDIOGRAM COMPLETE: CPT | Performed by: INTERNAL MEDICINE

## 2021-02-19 PROCEDURE — G8420 CALC BMI NORM PARAMETERS: HCPCS | Performed by: INTERNAL MEDICINE

## 2021-02-19 PROCEDURE — G8482 FLU IMMUNIZE ORDER/ADMIN: HCPCS | Performed by: INTERNAL MEDICINE

## 2021-02-19 PROCEDURE — 1036F TOBACCO NON-USER: CPT | Performed by: INTERNAL MEDICINE

## 2021-02-19 PROCEDURE — G8427 DOCREV CUR MEDS BY ELIG CLIN: HCPCS | Performed by: INTERNAL MEDICINE

## 2021-02-19 ASSESSMENT — PATIENT HEALTH QUESTIONNAIRE - PHQ9
2. FEELING DOWN, DEPRESSED OR HOPELESS: 0
SUM OF ALL RESPONSES TO PHQ QUESTIONS 1-9: 0

## 2021-02-19 NOTE — PROGRESS NOTES
[id number]     [address]  [address]  Dept: [phone]  Dept Fax: [phone]  Loc: [phone]     Visit Date:  2/19/2021    Patient:  Jose E Newman  YOB: 2000    HPI:   Jose E Newman presents today for   Chief Complaint   Patient presents with    Pre-op Exam     patient is here for a pre op physical. surgery is march 10th. .        HPI 21year old patient with a history of hypermobile Christen-Danlos syndrome associated with chronic muscle aches and pains, instability of her joints, hypermobility of her joints as well as postural orthostatic tachycardia syndrome, chronic constipation and possible gastroparesis is coming in for pre-op assessment for hip arthroscopy labral repair/debridement/reconstruction/osteoplasty of the femoral neck and open periacetabular osteotomy. Pre-Operative Risk assessment using 2014 ACC/AHA guidelines     Emergent procedure No  Active Cardiac Condition No (decompensated HF, Arrhythmia, MI <3 weeks, severe valve disease)  Risk Level of Procedure Intermediate Risk (intraperitoneal, intrathoracic, HENT, orthopedic, or carotid endarterectomy, etc.)  Revised Cardiac Risk Index Risk factors: None  Measurement of Exercise Tolerance before Surgery >4 No    According to the 2014 ACC/AHA pre-operative risk assessment guidelines Jose E Newman is a intermediate risk for major cardiac complications during a intermediate risk procedure and may continue as planned. Specific medication recommendations are listed below. Medications recommended to continue should be taken with a sip of water even when NPO.      Further recommendations from consultants: None    Medication Recommendations:    -Continue with Florinef as prescribed to prevent any form of orthostatic hypotension given her pots syndrome.    -Recommended holding baclofen and BuSpar/ Lamictal and Cymbalta at least 2 days before the surgery to prevent for any severe withdrawals as well as avoid any form of extreme sedation drowsiness central respiratory depression or profound sedation/interaction with general anesthetic drugs. Planned anesthesia: General or spinal  Known anesthesia problems: None   Bleeding risk: No recent or remote history of abnormal bleeding  Personal or FH of DVT/PE: No    Patient objection to receiving blood products: No     Further recommendations from consultants: None  Medication Recommendations:Discontinue ASA 7 days before surgery, Discontinue NSAIDs  7 days before surgery     EKG-showed normal sinus rhythm with no ischemic changes in the office today. Medications  Prior to Visit Medications    Medication Sig Taking? Authorizing Provider   Levonorgestrel [facility] [place]) IUD 19.5 mg 19.5 each by Intrauterine route Yes Historical Provider, MD   Cholecalciferol 50 MCG (2000 UT) TABS Take 2,000 Units by mouth nightly Yes Historical Provider, MD   fludrocortisone (FLORINEF) 0.1 MG tablet Take 1 tablet by mouth daily Take one pill once daily Yes Jimmy Dewey MD   baclofen (LIORESAL) 10 MG tablet Take 1 tablet by mouth 2 times daily Yes Jimmy Dewey MD   busPIRone (BUSPAR) 10 MG tablet Take 1 tablet by mouth 2 times daily Take one pill twice daily Yes Jimmy Dewey MD   lamoTRIgine (LAMICTAL) 100 MG tablet Take 1 tablet by mouth daily Take one daily Yes Jimmy Dewey MD   DULoxetine (CYMBALTA) 30 MG extended release capsule Take 1 capsule by mouth 2 times daily Take 1 pill twice daily Yes Jimmy Dewey MD   loratadine (CLARITIN) 10 MG capsule Take 10 mg by mouth daily Yes Historical Provider, MD   norethindrone (AYGESTIN) 5 MG tablet Take 0.35 mg by mouth daily  Historical Provider, MD        Allergies:  is allergic to reglan [metoclopramide]; corn-containing products; abilify [aripiprazole]; and aloe vera.      Past Medical History:   has a past medical history of Anxiety, Autism spectrum disorder, Bipolar disorder ([address] Depression, Dysmenorrhea, Christen-Danlos syndrome type III, GERD (gastroesophageal reflux disease), Headache, Menorrhagia, Postural orthostatic tachycardia syndrome, Syringomyelia ([address] and Urinary incontinence. Past Surgical History   has a past surgical history that includes Cholecystectomy and [place] tooth extraction (2019). Family History  family history includes Alcohol Abuse in his maternal grandfather and paternal uncle; Dementia in his maternal grandmother; Depression in his father and mother; Heart Attack in his maternal grandfather, maternal uncle, and paternal grandfather; Heart Disease in his maternal grandfather, maternal uncle, mother, paternal grandfather, and sister; High Cholesterol in his maternal grandfather and mother; Learning Disabilities in his brother; Mental Illness in his brother, father, mother, paternal aunt, and paternal uncle; [de-identified] / [place] in his mother; Other in his mother; Substance Abuse in his paternal aunt, paternal cousin, and paternal uncle. Social History   reports that he has never smoked. He has never used smokeless tobacco. He reports that he does not drink alcohol or use drugs. Health Maintenance:    Health Maintenance   Topic Date Due    Hepatitis C screen  2000    Varicella vaccine (1 of 2 - 2-dose childhood series) 09/24/2001    HPV vaccine (1 - 2-dose series) 09/24/2011    HIV screen  09/24/2015    Chlamydia screen  09/24/2016    DTaP/Tdap/Td vaccine (1 - Tdap) 09/24/2019    Flu vaccine  Completed    Hepatitis A vaccine  Aged Out    Hepatitis B vaccine  Aged Out    Hib vaccine  Aged Out    Meningococcal (ACWY) vaccine  Aged Out    Pneumococcal 0-64 years Vaccine  Aged Out       Subjective:      Review of Systems   Constitutional: Negative for fatigue, fever and unexpected weight change. HENT: Negative for ear pain, postnasal drip, rhinorrhea, sinus pain, sore throat and trouble swallowing. Metabolic Panel    Protime-INR    APTT    Urinalysis Reflex to Culture    Type And Screen    Vitamin D 25 Hydroxy    Albumin    Hemoglobin A1C    Miscellaneous Lab Test #1    Fructosamine         PLAN   Patient is medically cleared for the surgery.      Known risk factors for perioperative complications: None  Current medications which may produce withdrawal symptoms if withheld perioperatively: yes     -Continue with Florinef as prescribed to prevent any form of orthostatic hypotension given her pots syndrome.    -Recommended holding baclofen and BuSpar/ Lamictal and Cymbalta at least 2 days before the surgery to prevent for any severe withdrawals as well as avoid any form of extreme sedation drowsiness central respiratory depression or profound sedation/interaction with general anesthetic drugs. Can resume most of her    1. Preoperative workup as follows:EKG looks stable normal sinus rhythm/no ischemic changes. Labs ordered today. 2. Change in medication regimen before surgery: Discontinue ASA 7 days before surgery, Discontinue NSAIDs () 7 days before surgery  3. Prophylaxis for cardiac events with perioperative beta-blockers: Not indicated  ACC/AHA indications for pre-operative beta-blocker use:    · Vascular surgery with history of postitive stress test  · Intermediate or high risk surgery with history of CAD   · Intermediate or high risk surgery with multiple clinical predictors of CAD- 2 of the following: history of compensated or prior heart failure, history of cerebrovascular disease, DM, or renal insufficiency     Routine administration of higher-dose, long-acting metoprolol in beta-blocker-naïve patients on the day of surgery, and in the absence of dose titration is associated with an overall increase in mortality.  Beta-blockers should be started days to weeks prior to surgery and titrated to pulse < 70.  4. Deep vein thrombosis prophylaxis: regimen to be chosen by surgical team  5.  No contraindications to planned surgery         Orders Placed This Encounter   Procedures    CBC With Auto Differential     Standing Status:   Future     Standing Expiration Date:   2/19/2022    Basic Metabolic Panel     Standing Status:   Future     Standing Expiration Date:   2/19/2022    Protime-INR     Standing Status:   Future     Standing Expiration Date:   2/19/2022     Order Specific Question:   Daily Coumadin Dose? Answer:   NONE    APTT     Standing Status:   Future     Standing Expiration Date:   2/19/2022     Order Specific Question:   Daily Heparin Dose? Answer:   NONE    Urinalysis Reflex to Culture     Standing Status:   Future     Standing Expiration Date:   2/19/2022     Order Specific Question:   SPECIFY(EX-CATH,MIDSTREAM,CYSTO,ETC)? Answer:   MID STREAM    Vitamin D 25 Hydroxy     Standing Status:   Future     Standing Expiration Date:   2/19/2022    Albumin     Standing Status:   Future     Standing Expiration Date:   2/19/2022    Hemoglobin A1C     Standing Status:   Future     Standing Expiration Date:   2/19/2022    Miscellaneous Lab Test #1     Standing Status:   Future     Standing Expiration Date:   2/19/2022     Order Specific Question:   Specify Req. Test (1 Test/Order)     Answer:   serum fructosamine    Fructosamine     Standing Status:   Future     Standing Expiration Date:   2/19/2022    EKG 12 Lead     Order Specific Question:   Reason for Exam?     Answer:   Chest pain    Type And Screen     Standing Status:   Future     Standing Expiration Date:   2/19/2022        No follow-ups on file. Patient given educational materials - see patient instructions. Discussed use, benefit, and side effects of prescribed medications. All patient questions answered. Pt voiced understanding. Reviewed health maintenance.        Electronically signed Raymundo Shipley MD on 2/19/2021 at 9:34 AM EST

## 2021-02-24 ASSESSMENT — ENCOUNTER SYMPTOMS
SHORTNESS OF BREATH: 0
ABDOMINAL PAIN: 0
CHEST TIGHTNESS: 0
RHINORRHEA: 0
SINUS PAIN: 0
DIARRHEA: 0
TROUBLE SWALLOWING: 0
SORE THROAT: 0
COUGH: 0
BLOOD IN STOOL: 0

## 2021-02-26 DIAGNOSIS — Z01.818 PRE-OP EVALUATION: ICD-10-CM

## 2021-03-01 DIAGNOSIS — Z01.818 PRE-OP EVALUATION: ICD-10-CM

## 2021-03-06 ENCOUNTER — HOSPITAL ENCOUNTER (OUTPATIENT)
Age: 21
Setting detail: SPECIMEN
Discharge: HOME | End: 2021-03-06
Payer: COMMERCIAL

## 2021-03-06 ENCOUNTER — NURSE ONLY (OUTPATIENT)
Dept: FAMILY MEDICINE CLINIC | Age: 21
End: 2021-03-06
Payer: COMMERCIAL

## 2021-03-06 DIAGNOSIS — Z01.818 PREOP TESTING: Primary | ICD-10-CM

## 2021-03-06 DIAGNOSIS — Z01.818 PREOP TESTING: ICD-10-CM

## 2021-03-06 PROCEDURE — U0003 INFECTIOUS AGENT DETECTION BY NUCLEIC ACID (DNA OR RNA); SEVERE ACUTE RESPIRATORY SYNDROME CORONAVIRUS 2 (SARS-COV-2) (CORONAVIRUS DISEASE [COVID-19]), AMPLIFIED PROBE TECHNIQUE, MAKING USE OF HIGH THROUGHPUT TECHNOLOGIES AS DESCRIBED BY CMS-2020-01-R: HCPCS

## 2021-03-06 PROCEDURE — U0005 INFEC AGEN DETEC AMPLI PROBE: HCPCS

## 2021-03-08 DIAGNOSIS — F32.A DEPRESSION, UNSPECIFIED DEPRESSION TYPE: ICD-10-CM

## 2021-03-08 DIAGNOSIS — G95.0 SYRINGOMYELIA (HCC): ICD-10-CM

## 2021-03-08 DIAGNOSIS — S73.191D TEAR OF RIGHT ACETABULAR LABRUM, SUBSEQUENT ENCOUNTER: ICD-10-CM

## 2021-03-08 DIAGNOSIS — F48.9 MOOD PROBLEM: ICD-10-CM

## 2021-03-08 DIAGNOSIS — G90.A POTS (POSTURAL ORTHOSTATIC TACHYCARDIA SYNDROME): ICD-10-CM

## 2021-03-08 DIAGNOSIS — N80.9 ENDOMETRIOSIS: ICD-10-CM

## 2021-03-08 DIAGNOSIS — F41.9 ANXIETY: ICD-10-CM

## 2021-03-08 DIAGNOSIS — N94.6 DYSMENORRHEA: ICD-10-CM

## 2021-03-08 DIAGNOSIS — Q79.60 EHLERS-DANLOS SYNDROME: ICD-10-CM

## 2021-03-08 LAB
SARS-COV-2: NORMAL
SARS-COV-2: NOT DETECTED
SOURCE: NORMAL

## 2021-03-09 RX ORDER — BUSPIRONE HYDROCHLORIDE 10 MG/1
TABLET ORAL
Qty: 60 TABLET | Refills: 2 | Status: SHIPPED | OUTPATIENT
Start: 2021-03-09 | End: 2021-06-14

## 2021-03-09 NOTE — TELEPHONE ENCOUNTER
Francis Saul is requesting a refill on the following medication(s):  Requested Prescriptions     Pending Prescriptions Disp Refills    busPIRone (BUSPAR) 10 MG tablet [Pharmacy Med Name: BUSPIRONE HCL 10 MG TABLET] 60 tablet 2     Sig: take 1 tablet by mouth twice a day       Last Visit Date (If Applicable):  7/50/4215    Next Visit Date:    Visit date not found

## 2021-03-14 DIAGNOSIS — F48.9 MOOD PROBLEM: ICD-10-CM

## 2021-03-14 DIAGNOSIS — G90.A POTS (POSTURAL ORTHOSTATIC TACHYCARDIA SYNDROME): ICD-10-CM

## 2021-03-14 DIAGNOSIS — F32.A DEPRESSION, UNSPECIFIED DEPRESSION TYPE: ICD-10-CM

## 2021-03-14 DIAGNOSIS — Q79.60 EHLERS-DANLOS SYNDROME: ICD-10-CM

## 2021-03-14 DIAGNOSIS — N80.9 ENDOMETRIOSIS: ICD-10-CM

## 2021-03-14 DIAGNOSIS — F41.9 ANXIETY: ICD-10-CM

## 2021-03-14 DIAGNOSIS — G95.0 SYRINGOMYELIA (HCC): ICD-10-CM

## 2021-03-14 DIAGNOSIS — N94.6 DYSMENORRHEA: ICD-10-CM

## 2021-03-14 DIAGNOSIS — S73.191D TEAR OF RIGHT ACETABULAR LABRUM, SUBSEQUENT ENCOUNTER: ICD-10-CM

## 2021-03-15 RX ORDER — LAMOTRIGINE 100 MG/1
TABLET ORAL
Qty: 30 TABLET | Refills: 2 | Status: SHIPPED | OUTPATIENT
Start: 2021-03-15 | End: 2021-06-14 | Stop reason: SDUPTHER

## 2021-03-15 NOTE — TELEPHONE ENCOUNTER
Ree Trejo is requesting a refill on the following medication(s):  Requested Prescriptions     Pending Prescriptions Disp Refills    lamoTRIgine (LAMICTAL) 100 MG tablet [Pharmacy Med Name: LAMOTRIGINE 100 MG TABLET] 30 tablet 2     Sig: take 1 tablet by mouth once daily       Last Visit Date (If Applicable):  6/63/8040    Next Visit Date:    Visit date not found

## 2021-03-24 DIAGNOSIS — F32.A DEPRESSION, UNSPECIFIED DEPRESSION TYPE: ICD-10-CM

## 2021-03-24 DIAGNOSIS — G90.A POTS (POSTURAL ORTHOSTATIC TACHYCARDIA SYNDROME): ICD-10-CM

## 2021-03-24 DIAGNOSIS — F41.9 ANXIETY: ICD-10-CM

## 2021-03-24 DIAGNOSIS — F48.9 MOOD PROBLEM: ICD-10-CM

## 2021-03-24 DIAGNOSIS — G95.0 SYRINGOMYELIA (HCC): ICD-10-CM

## 2021-03-24 DIAGNOSIS — Q79.60 EHLERS-DANLOS SYNDROME: ICD-10-CM

## 2021-03-24 DIAGNOSIS — N80.9 ENDOMETRIOSIS: ICD-10-CM

## 2021-03-24 DIAGNOSIS — S73.191D TEAR OF RIGHT ACETABULAR LABRUM, SUBSEQUENT ENCOUNTER: ICD-10-CM

## 2021-03-24 DIAGNOSIS — N94.6 DYSMENORRHEA: ICD-10-CM

## 2021-03-26 RX ORDER — DULOXETIN HYDROCHLORIDE 30 MG/1
30 CAPSULE, DELAYED RELEASE ORAL 2 TIMES DAILY
Qty: 60 CAPSULE | Refills: 3 | Status: SHIPPED | OUTPATIENT
Start: 2021-03-26 | End: 2021-09-21

## 2021-03-26 NOTE — TELEPHONE ENCOUNTER
Elena James is requesting a refill on the following medication(s):  Requested Prescriptions     Pending Prescriptions Disp Refills    DULoxetine (CYMBALTA) 30 MG extended release capsule 60 capsule 3     Sig: Take 1 capsule by mouth 2 times daily Take 1 pill twice daily       Last Visit Date (If Applicable):  5/31/4000    Next Visit Date:    Visit date not found

## 2021-04-12 DIAGNOSIS — M62.838 MUSCLE SPASM: ICD-10-CM

## 2021-04-12 DIAGNOSIS — N94.6 DYSMENORRHEA: ICD-10-CM

## 2021-04-12 DIAGNOSIS — Q79.62 HYPERMOBILE EHLERS-DANLOS SYNDROME: ICD-10-CM

## 2021-04-12 DIAGNOSIS — F39 MOOD DISORDER (HCC): ICD-10-CM

## 2021-04-12 DIAGNOSIS — F41.9 ANXIETY: ICD-10-CM

## 2021-04-12 DIAGNOSIS — F32.A DEPRESSION, UNSPECIFIED DEPRESSION TYPE: ICD-10-CM

## 2021-04-12 DIAGNOSIS — K59.09 CHRONIC CONSTIPATION: ICD-10-CM

## 2021-04-12 DIAGNOSIS — G90.A POTS (POSTURAL ORTHOSTATIC TACHYCARDIA SYNDROME): ICD-10-CM

## 2021-04-12 DIAGNOSIS — M25.50 ARTHRALGIA, UNSPECIFIED JOINT: ICD-10-CM

## 2021-04-12 DIAGNOSIS — N92.1 MENORRHAGIA WITH IRREGULAR CYCLE: ICD-10-CM

## 2021-04-12 RX ORDER — BACLOFEN 10 MG/1
10 TABLET ORAL 2 TIMES DAILY
Qty: 60 TABLET | Refills: 3 | Status: SHIPPED | OUTPATIENT
Start: 2021-04-12 | End: 2021-09-28 | Stop reason: SDUPTHER

## 2021-04-12 NOTE — TELEPHONE ENCOUNTER
Aneesh Khan is requesting a refill on the following medication(s):  Requested Prescriptions     Pending Prescriptions Disp Refills    baclofen (LIORESAL) 10 MG tablet 60 tablet 3     Sig: Take 1 tablet by mouth 2 times daily       Last Visit Date (If Applicable):  8/49/1107    Next Visit Date:    Visit date not found

## 2021-05-03 ENCOUNTER — OFFICE VISIT (OUTPATIENT)
Dept: FAMILY MEDICINE CLINIC | Age: 21
End: 2021-05-03
Payer: COMMERCIAL

## 2021-05-03 ENCOUNTER — HOSPITAL ENCOUNTER (OUTPATIENT)
Age: 21
Setting detail: SPECIMEN
Discharge: HOME | End: 2021-05-03
Payer: COMMERCIAL

## 2021-05-03 VITALS
WEIGHT: 124 LBS | BODY MASS INDEX: 18.85 KG/M2 | OXYGEN SATURATION: 99 % | SYSTOLIC BLOOD PRESSURE: 118 MMHG | HEART RATE: 117 BPM | DIASTOLIC BLOOD PRESSURE: 78 MMHG

## 2021-05-03 DIAGNOSIS — Z98.890 STATUS POST ARTHROSCOPY OF HIP: ICD-10-CM

## 2021-05-03 DIAGNOSIS — M24.80 GENERALIZED HYPERMOBILITY OF JOINTS: ICD-10-CM

## 2021-05-03 DIAGNOSIS — K59.09 CHRONIC CONSTIPATION: ICD-10-CM

## 2021-05-03 DIAGNOSIS — R79.1 PROTHROMBIN TIME INCREASED: ICD-10-CM

## 2021-05-03 DIAGNOSIS — G90.A POTS (POSTURAL ORTHOSTATIC TACHYCARDIA SYNDROME): ICD-10-CM

## 2021-05-03 DIAGNOSIS — Z97.5 IUD (INTRAUTERINE DEVICE) IN PLACE: ICD-10-CM

## 2021-05-03 DIAGNOSIS — S73.191S TEAR OF RIGHT ACETABULAR LABRUM, SEQUELA: ICD-10-CM

## 2021-05-03 DIAGNOSIS — D64.9 ANEMIA, UNSPECIFIED TYPE: Primary | ICD-10-CM

## 2021-05-03 DIAGNOSIS — D64.9 ANEMIA, UNSPECIFIED TYPE: ICD-10-CM

## 2021-05-03 DIAGNOSIS — R53.83 OTHER FATIGUE: ICD-10-CM

## 2021-05-03 DIAGNOSIS — K31.84 GASTROPARESIS: ICD-10-CM

## 2021-05-03 DIAGNOSIS — Q79.60 EHLERS-DANLOS SYNDROME: ICD-10-CM

## 2021-05-03 PROBLEM — S73.191A TEAR OF RIGHT ACETABULAR LABRUM: Status: RESOLVED | Noted: 2021-05-03 | Resolved: 2021-05-03

## 2021-05-03 PROBLEM — S73.191A TEAR OF RIGHT ACETABULAR LABRUM: Status: ACTIVE | Noted: 2021-05-03

## 2021-05-03 LAB
ABSOLUTE EOS #: 0.12 K/UL (ref 0–0.44)
ABSOLUTE IMMATURE GRANULOCYTE: <0.03 K/UL (ref 0–0.3)
ABSOLUTE LYMPH #: 1.59 K/UL (ref 1.2–5.2)
ABSOLUTE MONO #: 0.51 K/UL (ref 0.1–1.4)
BASOPHILS # BLD: 1 % (ref 0–2)
BASOPHILS ABSOLUTE: 0.05 K/UL (ref 0–0.2)
DIFFERENTIAL TYPE: ABNORMAL
EOSINOPHILS RELATIVE PERCENT: 3 % (ref 1–4)
HCT VFR BLD CALC: 38.1 % (ref 36.3–47.1)
HEMOGLOBIN: 12.1 G/DL (ref 11.9–15.1)
IMMATURE GRANULOCYTES: 0 %
LYMPHOCYTES # BLD: 39 % (ref 25–45)
MCH RBC QN AUTO: 29.5 PG (ref 25.2–33.5)
MCHC RBC AUTO-ENTMCNC: 31.8 G/DL (ref 25.2–33.5)
MCV RBC AUTO: 92.9 FL (ref 82.6–102.9)
MONOCYTES # BLD: 12 % (ref 2–8)
NRBC AUTOMATED: 0 PER 100 WBC
PDW BLD-RTO: 12.1 % (ref 11.8–14.4)
PLATELET # BLD: 325 K/UL (ref 138–453)
PLATELET ESTIMATE: ABNORMAL
PMV BLD AUTO: 9.6 FL (ref 8.1–13.5)
RBC # BLD: 4.1 M/UL (ref 3.95–5.11)
RBC # BLD: ABNORMAL 10*6/UL
SEG NEUTROPHILS: 45 % (ref 34–64)
SEGMENTED NEUTROPHILS ABSOLUTE COUNT: 1.82 K/UL (ref 1.8–8)
WBC # BLD: 4.1 K/UL (ref 4.5–13.5)
WBC # BLD: ABNORMAL 10*3/UL

## 2021-05-03 PROCEDURE — 83550 IRON BINDING TEST: CPT

## 2021-05-03 PROCEDURE — 99214 OFFICE O/P EST MOD 30 MIN: CPT | Performed by: INTERNAL MEDICINE

## 2021-05-03 PROCEDURE — 36415 COLL VENOUS BLD VENIPUNCTURE: CPT

## 2021-05-03 PROCEDURE — 85025 COMPLETE CBC W/AUTO DIFF WBC: CPT

## 2021-05-03 PROCEDURE — G8420 CALC BMI NORM PARAMETERS: HCPCS | Performed by: INTERNAL MEDICINE

## 2021-05-03 PROCEDURE — G8427 DOCREV CUR MEDS BY ELIG CLIN: HCPCS | Performed by: INTERNAL MEDICINE

## 2021-05-03 PROCEDURE — 83540 ASSAY OF IRON: CPT

## 2021-05-03 PROCEDURE — 82728 ASSAY OF FERRITIN: CPT

## 2021-05-03 PROCEDURE — 1036F TOBACCO NON-USER: CPT | Performed by: INTERNAL MEDICINE

## 2021-05-03 RX ORDER — GABAPENTIN 300 MG/1
300 CAPSULE ORAL 3 TIMES DAILY
COMMUNITY
Start: 2021-04-10 | End: 2021-11-30

## 2021-05-03 RX ORDER — TRAMADOL HYDROCHLORIDE 50 MG/1
50 TABLET ORAL 3 TIMES DAILY PRN
COMMUNITY
Start: 2021-04-27 | End: 2021-11-19 | Stop reason: ALTCHOICE

## 2021-05-03 RX ORDER — DIAZEPAM 5 MG/1
5 TABLET ORAL EVERY 8 HOURS PRN
COMMUNITY
Start: 2021-03-10 | End: 2021-11-19 | Stop reason: ALTCHOICE

## 2021-05-03 RX ORDER — HYDROMORPHONE HYDROCHLORIDE 2 MG/1
2 TABLET ORAL EVERY 4 HOURS PRN
COMMUNITY
Start: 2021-03-19 | End: 2021-11-19 | Stop reason: ALTCHOICE

## 2021-05-03 RX ORDER — FAMOTIDINE 20 MG/1
20 TABLET, FILM COATED ORAL 2 TIMES DAILY
COMMUNITY
Start: 2021-03-10

## 2021-05-03 NOTE — PROGRESS NOTES
[address]  [address]  Dept: [phone]  Dept Fax: [phone]: [phone]     Visit Date:  5/3/2021    Patient:  Yovani Cohen  YOB: 2000    HPI:   Yovani Cohen presents today for   Chief Complaint   Patient presents with   [address]     patient is here today to discuss repeat lab work.  Anemia     patient would like to discuss possible issues with anemia. she is really fatigued and is sleeping a lot. Ayleen Beano [id number] old patient with a history of hypermobile Christen-Danlos syndrome associated with chronic muscle aches and pains, instability of her joints, hypermobility of her joints as well as postural orthostatic tachycardia syndrome, chronic constipation and gastroparesis is coming in for a follow up. Patient underwent right-sided hip arthroscopy surgery/debridement/osteoplasty of the femoral neck and open periacetabular osteotomy/reconstruction few weeks back and currently doing well. She has been doing rehab/physical exercises and came in with crutches orthotic/hip joint stabilizers today. The surgical incision site has been healing well and her femoral pulses as well as distal pulses are well preserved. Range of motion is getting better with daily muscle strengthening exercises. Reports during the course of the surgery she was noted to have profound acute anemia requiring packed RBCs infusion. She reports she dipped down as low as below 6 gm/dl. Report having slight intermittent spotting currently has IUD in place but no profound major visible bleeding from her stools or bladder or bowels. And she feels tired and extreme fatigue has been having headaches lately and she is sleeping a lot. She feels exhausted feels like she looks pale.   She started taking vitamin D supplements as as well has started extended release tablets of iron supplements. Medications  Prior to Visit Medications    Medication Sig Taking? Authorizing Provider   gabapentin (NEURONTIN) 300 MG capsule Take 300 mg by mouth 3 times daily. Yes Historical Provider, MD   traMADol (ULTRAM) 50 MG tablet Take 50 mg by mouth 3 times daily as needed. Yes Historical Provider, MD   HYDROmorphone (DILAUDID) 2 MG tablet 2 mg every 6 hours as needed. Yes Historical Provider, MD   famotidine (PEPCID) 20 MG tablet Take 20 mg by mouth 2 times daily Yes Historical Provider, MD   diazePAM (VALIUM) 5 MG tablet Take 5 mg by mouth every 8 hours as needed. Yes Historical Provider, MD   baclofen (LIORESAL) 10 MG tablet Take 1 tablet by mouth 2 times daily Yes Claudette Scales MD   DULoxetine (CYMBALTA) 30 MG extended release capsule Take 1 capsule by mouth 2 times daily Take 1 pill twice daily Yes Claudette Scales MD   lamoTRIgine (LAMICTAL) 100 MG tablet take 1 tablet by mouth once daily Yes Claudette Scales MD   busPIRone (BUSPAR) 10 MG tablet take 1 tablet by mouth twice a day Yes Claudette Scales MD   Levonorgestrel [facility]) IUD 19.5 mg 19.5 each by Intrauterine route Yes Historical Provider, MD   Cholecalciferol 50 MCG (2000 UT) TABS Take 2,000 Units by mouth nightly Yes Historical Provider, MD   fludrocortisone (FLORINEF) 0.1 MG tablet Take 1 tablet by mouth daily Take one pill once daily Yes Claudette Scales MD   loratadine (CLARITIN) 10 MG capsule Take 10 mg by mouth daily Yes Historical Provider, MD        Allergies:  is allergic to reglan [metoclopramide]; corn-containing products; abilify [aripiprazole]; and aloe vera. Past Medical History:   has a past medical history of Anxiety, Autism spectrum disorder, Bipolar disorder ([facility] [address] Depression, Dysmenorrhea, Christen-Danlos syndrome type III, GERD (gastroesophageal reflux disease), Headache, Menorrhagia, Postural orthostatic tachycardia syndrome, Syringomyelia ([facility] [address] and Urinary incontinence.     Past Surgical History   has a past Iron and TIBC    Ferritin    Miscellaneous Sendout 1   4. Generalized hypermobility of joints  CBC With Auto Differential    Iron and TIBC    Ferritin    Miscellaneous Sendout 1   5. POTS (postural orthostatic tachycardia syndrome)  CBC With Auto Differential    Iron and TIBC    Ferritin    Miscellaneous Sendout 1   6. Gastroparesis  CBC With Auto Differential    Iron and TIBC    Ferritin    Miscellaneous Sendout 1   7. Chronic constipation  CBC With Auto Differential    Iron and TIBC    Ferritin    Miscellaneous Sendout 1   8. Status post arthroscopy of hip  CBC With Auto Differential    Iron and TIBC    Ferritin    Miscellaneous Sendout 1   9. Tear of right acetabular labrum, sequela  CBC With Auto Differential    Iron and TIBC    Ferritin    Miscellaneous Sendout 1   10. Prothrombin time increased  CBC With Auto Differential    Iron and TIBC    Ferritin    Miscellaneous Sendout 1   11. Other fatigue  CBC With Auto Differential    Iron and TIBC    Ferritin    Miscellaneous Sendout 1         PLAN   CBC/Iron studies ordered. Continue with Iron supplements/Vit D supplements. Orders Placed This Encounter   Procedures    CBC With Auto Differential     Standing Status:   Future     Number of Occurrences:   1     Standing Expiration Date:   5/3/2022    Iron and TIBC     Standing Status:   Future     Number of Occurrences:   1     Standing Expiration Date:   5/3/2022     Order Specific Question:   Is Patient Fasting? Answer:   none     Order Specific Question:   No of Hours? Answer:   none    Ferritin     Standing Status:   Future     Number of Occurrences:   1     Standing Expiration Date:   5/3/2022    Miscellaneous Sendout 1     Standing Status:   Future     Standing Expiration Date:   5/3/2022     Order Specific Question:   Specify Req. Test (1 Test/Order)     Answer:   Iron saturation        No follow-ups on file. Patient given educational materials - see patient instructions.   Discussed use,

## 2021-05-04 LAB
FERRITIN: 63 UG/L (ref 13–150)
IRON SATURATION: 18 % (ref 20–55)
IRON: 51 UG/DL (ref 37–145)
TOTAL IRON BINDING CAPACITY: 287 UG/DL (ref 250–450)
UNSATURATED IRON BINDING CAPACITY: 236 UG/DL (ref 112–347)

## 2021-05-05 ASSESSMENT — ENCOUNTER SYMPTOMS
COUGH: 0
DIARRHEA: 0
RHINORRHEA: 0
SINUS PAIN: 0
SHORTNESS OF BREATH: 0
CHEST TIGHTNESS: 0
BLOOD IN STOOL: 0
SORE THROAT: 0
TROUBLE SWALLOWING: 0
ABDOMINAL PAIN: 0

## 2021-06-04 ENCOUNTER — OFFICE VISIT (OUTPATIENT)
Dept: FAMILY MEDICINE CLINIC | Age: 21
End: 2021-06-04
Payer: COMMERCIAL

## 2021-06-04 ENCOUNTER — HOSPITAL ENCOUNTER (OUTPATIENT)
Age: 21
Setting detail: SPECIMEN
Discharge: HOME | End: 2021-06-04
Payer: COMMERCIAL

## 2021-06-04 VITALS
BODY MASS INDEX: 19.61 KG/M2 | OXYGEN SATURATION: 99 % | WEIGHT: 129 LBS | SYSTOLIC BLOOD PRESSURE: 132 MMHG | DIASTOLIC BLOOD PRESSURE: 82 MMHG | HEART RATE: 99 BPM

## 2021-06-04 DIAGNOSIS — Z92.89 HISTORY OF BLOOD TRANSFUSION: Primary | ICD-10-CM

## 2021-06-04 DIAGNOSIS — D50.9 IRON DEFICIENCY ANEMIA, UNSPECIFIED IRON DEFICIENCY ANEMIA TYPE: ICD-10-CM

## 2021-06-04 DIAGNOSIS — Z92.89 HISTORY OF BLOOD TRANSFUSION: ICD-10-CM

## 2021-06-04 LAB
ABSOLUTE EOS #: 0.08 K/UL (ref 0–0.44)
ABSOLUTE IMMATURE GRANULOCYTE: <0.03 K/UL (ref 0–0.3)
ABSOLUTE LYMPH #: 1.99 K/UL (ref 1.2–5.2)
ABSOLUTE MONO #: 0.37 K/UL (ref 0.1–1.4)
BASOPHILS # BLD: 1 % (ref 0–2)
BASOPHILS ABSOLUTE: 0.03 K/UL (ref 0–0.2)
DIFFERENTIAL TYPE: NORMAL
EOSINOPHILS RELATIVE PERCENT: 2 % (ref 1–4)
FERRITIN: 34 UG/L (ref 13–150)
HCT VFR BLD CALC: 39.8 % (ref 36.3–47.1)
HEMOGLOBIN: 12.5 G/DL (ref 11.9–15.1)
IMMATURE GRANULOCYTES: 0 %
IRON SATURATION: 32 % (ref 20–55)
IRON: 102 UG/DL (ref 37–145)
LYMPHOCYTES # BLD: 44 % (ref 25–45)
MCH RBC QN AUTO: 29.6 PG (ref 25.2–33.5)
MCHC RBC AUTO-ENTMCNC: 31.4 G/DL (ref 25.2–33.5)
MCV RBC AUTO: 94.3 FL (ref 82.6–102.9)
MONOCYTES # BLD: 8 % (ref 2–8)
NRBC AUTOMATED: 0 PER 100 WBC
PDW BLD-RTO: 12.2 % (ref 11.8–14.4)
PLATELET # BLD: 336 K/UL (ref 138–453)
PLATELET ESTIMATE: NORMAL
PMV BLD AUTO: 9 FL (ref 8.1–13.5)
RBC # BLD: 4.22 M/UL (ref 3.95–5.11)
RBC # BLD: NORMAL 10*6/UL
SEG NEUTROPHILS: 45 % (ref 34–64)
SEGMENTED NEUTROPHILS ABSOLUTE COUNT: 2.05 K/UL (ref 1.8–8)
TOTAL IRON BINDING CAPACITY: 317 UG/DL (ref 250–450)
UNSATURATED IRON BINDING CAPACITY: 215 UG/DL (ref 112–347)
WBC # BLD: 4.5 K/UL (ref 4.5–13.5)
WBC # BLD: NORMAL 10*3/UL

## 2021-06-04 PROCEDURE — 82728 ASSAY OF FERRITIN: CPT

## 2021-06-04 PROCEDURE — G8420 CALC BMI NORM PARAMETERS: HCPCS | Performed by: INTERNAL MEDICINE

## 2021-06-04 PROCEDURE — G8427 DOCREV CUR MEDS BY ELIG CLIN: HCPCS | Performed by: INTERNAL MEDICINE

## 2021-06-04 PROCEDURE — 1036F TOBACCO NON-USER: CPT | Performed by: INTERNAL MEDICINE

## 2021-06-04 PROCEDURE — 36415 COLL VENOUS BLD VENIPUNCTURE: CPT

## 2021-06-04 PROCEDURE — 85025 COMPLETE CBC W/AUTO DIFF WBC: CPT

## 2021-06-04 PROCEDURE — 83550 IRON BINDING TEST: CPT

## 2021-06-04 PROCEDURE — 99214 OFFICE O/P EST MOD 30 MIN: CPT | Performed by: INTERNAL MEDICINE

## 2021-06-04 PROCEDURE — 83540 ASSAY OF IRON: CPT

## 2021-06-04 NOTE — PROGRESS NOTES
[address]  [address]  Dept: [phone]  Dept Fax: [phone]: [phone]     Visit Date:  6/4/2021    Patient:  Dulce Lemos  YOB: 2000    HPI:   Dulce Lemos presents today for   Chief Complaint   Patient presents with    1 Month Follow-Up     patient is here today for a one month follow up. Her physical therapy is helping a lot but is doing much better. Ulices Escalera HPI 18 year old patient with a history of hypermobile Christen-Danlos syndrome associated with chronic muscle aches and pains, instability of her joints, hypermobility of her joints as well as postural orthostatic tachycardia syndrome, chronic constipation and gastroparesis is coming in for a one month follow up. Iron deficiency anemia requiring PRBC infusion during after surgery due to acute blood loss. She is currently on iron supplements reports she has been doing fairly well more energetic, less sleepiness and less tired denies any visible forms of bleeding today. PT therapy seems to be helping a lot she is scheduled to go for another surgery in September. She is status post right-sided hip arthroscopy surgery/debridement/osteoplasty of the femoral neck and open periacetabular osteotomy/reconstruction         Medications  Prior to Visit Medications    Medication Sig Taking? Authorizing Provider   gabapentin (NEURONTIN) 300 MG capsule Take 300 mg by mouth 3 times daily. Yes Historical Provider, MD   traMADol (ULTRAM) 50 MG tablet Take 50 mg by mouth 3 times daily as needed. Yes Historical Provider, MD   HYDROmorphone (DILAUDID) 2 MG tablet 2 mg every 6 hours as needed. Yes Historical Provider, MD   famotidine (PEPCID) 20 MG tablet Take 20 mg by mouth 2 times daily Yes Historical Provider, MD   diazePAM (VALIUM) 5 MG tablet Take 5 mg by mouth every 8 hours as needed.  Yes Historical Provider, MD baclofen (LIORESAL) 10 MG tablet Take 1 tablet by mouth 2 times daily Yes Kelly Lemon MD   DULoxetine (CYMBALTA) 30 MG extended release capsule Take 1 capsule by mouth 2 times daily Take 1 pill twice daily Yes Kelly Lemon MD   lamoTRIgine (LAMICTAL) 100 MG tablet take 1 tablet by mouth once daily Yes Kelly Lemon MD   busPIRone (BUSPAR) 10 MG tablet take 1 tablet by mouth twice a day Yes Kelly Lemon MD   Levonorgestrel [facility]) IUD 19.5 mg 19.5 each by Intrauterine route Yes Historical Provider, MD   Cholecalciferol 50 MCG (2000 UT) TABS Take 2,000 Units by mouth nightly Yes Historical Provider, MD   fludrocortisone (FLORINEF) 0.1 MG tablet Take 1 tablet by mouth daily Take one pill once daily Yes Kelly Lemon MD   loratadine (CLARITIN) 10 MG capsule Take 10 mg by mouth daily Yes Historical Provider, MD        Allergies:  is allergic to reglan [metoclopramide], abilify [aripiprazole], and aloe vera. Past Medical History:   has a past medical history of Anxiety, Autism spectrum disorder, Bipolar disorder ([address] Depression, Dysmenorrhea, Christen-Danlos syndrome type III, GERD (gastroesophageal reflux disease), Headache, Menorrhagia, Postural orthostatic tachycardia syndrome, Syringomyelia ([address] and Urinary incontinence. Past Surgical History   has a past surgical history that includes Cholecystectomy and [place] tooth extraction (2019).     Family History  family history includes Alcohol Abuse in his maternal grandfather and paternal uncle; Dementia in his maternal grandmother; Depression in his father and mother; Heart Attack in his maternal grandfather, maternal uncle, and paternal grandfather; Heart Disease in his maternal grandfather, maternal uncle, mother, paternal grandfather, and sister; High Cholesterol in his maternal grandfather and mother; Learning Disabilities in his brother; Mental Illness in his brother, father, mother, paternal aunt, and paternal uncle; Miscarriages / Status:   Future     Number of Occurrences:   1     Standing Expiration Date:   6/4/2022     Order Specific Question:   Is Patient Fasting? Answer:   NONE     Order Specific Question:   No of Hours? Answer:   NONE    Miscellaneous Sendout 1     Standing Status:   Future     Standing Expiration Date:   6/4/2022     Order Specific Question:   Specify Req. Test (1 Test/Order)     Answer:   IRON SATURATION        No follow-ups on file. Patient given educational materials - see patient instructions. Discussed use, benefit, and side effects of prescribed medications. All patient questions answered. Pt voiced understanding. Reviewed health maintenance.        Electronically signed Mony Juarez MD on 6/4/2021 at 9:34 AM EDT

## 2021-06-09 ENCOUNTER — HOSPITAL ENCOUNTER (OUTPATIENT)
Dept: MRI IMAGING | Age: 21
Discharge: HOME | End: 2021-06-11
Payer: COMMERCIAL

## 2021-06-09 DIAGNOSIS — Q65.89 ACETABULAR DYSPLASIA: ICD-10-CM

## 2021-06-09 PROCEDURE — 73721 MRI JNT OF LWR EXTRE W/O DYE: CPT

## 2021-06-09 ASSESSMENT — ENCOUNTER SYMPTOMS
CHEST TIGHTNESS: 0
SINUS PAIN: 0
ABDOMINAL PAIN: 0
BLOOD IN STOOL: 0
SHORTNESS OF BREATH: 0
SORE THROAT: 0
TROUBLE SWALLOWING: 0
DIARRHEA: 0
RHINORRHEA: 0
COUGH: 0

## 2021-06-14 DIAGNOSIS — Q79.60 EHLERS-DANLOS SYNDROME: ICD-10-CM

## 2021-06-14 DIAGNOSIS — S73.191D TEAR OF RIGHT ACETABULAR LABRUM, SUBSEQUENT ENCOUNTER: ICD-10-CM

## 2021-06-14 DIAGNOSIS — G95.0 SYRINGOMYELIA (HCC): ICD-10-CM

## 2021-06-14 DIAGNOSIS — N80.9 ENDOMETRIOSIS: ICD-10-CM

## 2021-06-14 DIAGNOSIS — F41.9 ANXIETY: ICD-10-CM

## 2021-06-14 DIAGNOSIS — N94.6 DYSMENORRHEA: ICD-10-CM

## 2021-06-14 DIAGNOSIS — F32.A DEPRESSION, UNSPECIFIED DEPRESSION TYPE: ICD-10-CM

## 2021-06-14 DIAGNOSIS — G90.A POTS (POSTURAL ORTHOSTATIC TACHYCARDIA SYNDROME): ICD-10-CM

## 2021-06-14 DIAGNOSIS — F48.9 MOOD PROBLEM: ICD-10-CM

## 2021-06-14 RX ORDER — BUSPIRONE HYDROCHLORIDE 10 MG/1
TABLET ORAL
Qty: 60 TABLET | Refills: 2 | Status: SHIPPED | OUTPATIENT
Start: 2021-06-14 | End: 2021-09-13

## 2021-06-14 RX ORDER — LAMOTRIGINE 100 MG/1
TABLET ORAL
Qty: 30 TABLET | Refills: 2 | Status: SHIPPED | OUTPATIENT
Start: 2021-06-14 | End: 2021-09-13

## 2021-08-12 ENCOUNTER — OFFICE VISIT (OUTPATIENT)
Dept: FAMILY MEDICINE CLINIC | Age: 21
End: 2021-08-12
Payer: COMMERCIAL

## 2021-08-12 VITALS
RESPIRATION RATE: 12 BRPM | OXYGEN SATURATION: 99 % | BODY MASS INDEX: 19.67 KG/M2 | DIASTOLIC BLOOD PRESSURE: 82 MMHG | TEMPERATURE: 97.5 F | WEIGHT: 129.8 LBS | HEIGHT: 68 IN | SYSTOLIC BLOOD PRESSURE: 110 MMHG | HEART RATE: 110 BPM

## 2021-08-12 DIAGNOSIS — D64.9 ANEMIA, UNSPECIFIED TYPE: ICD-10-CM

## 2021-08-12 DIAGNOSIS — Z01.818 PRE-OP EXAM: Primary | ICD-10-CM

## 2021-08-12 LAB
ALBUMIN SERPL-MCNC: NORMAL G/DL
ALP BLD-CCNC: 94 U/L
ALT SERPL-CCNC: 17 U/L
ANION GAP SERPL CALCULATED.3IONS-SCNC: NORMAL MMOL/L
AST SERPL-CCNC: 25 U/L
AVERAGE GLUCOSE: NORMAL
BILIRUB SERPL-MCNC: 0.9 MG/DL (ref 0.1–1.4)
BUN BLDV-MCNC: 7 MG/DL
CALCIUM SERPL-MCNC: 9.4 MG/DL
CHLORIDE BLD-SCNC: NORMAL MMOL/L
CO2: 26 MMOL/L
CREAT SERPL-MCNC: 0.5 MG/DL
GFR CALCULATED: NORMAL
GLUCOSE BLD-MCNC: NORMAL MG/DL
HBA1C MFR BLD: 5 %
POTASSIUM SERPL-SCNC: 3.1 MMOL/L
SODIUM BLD-SCNC: 141 MMOL/L
TOTAL PROTEIN: 7.2

## 2021-08-12 PROCEDURE — 1036F TOBACCO NON-USER: CPT | Performed by: INTERNAL MEDICINE

## 2021-08-12 PROCEDURE — G8427 DOCREV CUR MEDS BY ELIG CLIN: HCPCS | Performed by: INTERNAL MEDICINE

## 2021-08-12 PROCEDURE — 99214 OFFICE O/P EST MOD 30 MIN: CPT | Performed by: INTERNAL MEDICINE

## 2021-08-12 PROCEDURE — G8420 CALC BMI NORM PARAMETERS: HCPCS | Performed by: INTERNAL MEDICINE

## 2021-08-12 PROCEDURE — 93000 ELECTROCARDIOGRAM COMPLETE: CPT | Performed by: INTERNAL MEDICINE

## 2021-08-12 SDOH — ECONOMIC STABILITY: FOOD INSECURITY: WITHIN THE PAST 12 MONTHS, YOU WORRIED THAT YOUR FOOD WOULD RUN OUT BEFORE YOU GOT MONEY TO BUY MORE.: PATIENT DECLINED

## 2021-08-12 SDOH — ECONOMIC STABILITY: FOOD INSECURITY: WITHIN THE PAST 12 MONTHS, THE FOOD YOU BOUGHT JUST DIDN'T LAST AND YOU DIDN'T HAVE MONEY TO GET MORE.: PATIENT DECLINED

## 2021-08-12 ASSESSMENT — SOCIAL DETERMINANTS OF HEALTH (SDOH): HOW HARD IS IT FOR YOU TO PAY FOR THE VERY BASICS LIKE FOOD, HOUSING, MEDICAL CARE, AND HEATING?: PATIENT DECLINED

## 2021-08-12 NOTE — PROGRESS NOTES
baclofen and BuSpar/ Lamictal and Cymbalta at least 2 days before the surgery to prevent for any severe withdrawals as well as avoid any form of extreme sedation drowsiness central respiratory depression or profound sedation/interaction with general anesthetic drugs.     Planned anesthesia: General or spinal  Known anesthesia problems: None   Bleeding risk: No recent or remote history of abnormal bleeding  Personal or FH of DVT/PE: No    Patient objection to receiving blood products: No     Further recommendations from consultants: None  Medication Recommendations:Discontinue ASA 7 days before surgery, Discontinue NSAIDs  7 days before surgery     EKG-showed normal sinus rhythm with no evidence of ischemia/bundle branch blocks or arrhythmia. Medications  Prior to Visit Medications    Medication Sig Taking? Authorizing Provider   busPIRone (BUSPAR) 10 MG tablet take 1 tablet by mouth twice a day Yes Pérez Pitts MD   lamoTRIgine (LAMICTAL) 100 MG tablet take 1 tablet by mouth once daily Yes Pérez Pitts MD   gabapentin (NEURONTIN) 300 MG capsule Take 300 mg by mouth 3 times daily. Take 2 at hs. Yes Historical Provider, MD   baclofen (LIORESAL) 10 MG tablet Take 1 tablet by mouth 2 times daily Yes Pérez Pitts MD   DULoxetine (CYMBALTA) 30 MG extended release capsule Take 1 capsule by mouth 2 times daily Take 1 pill twice daily Yes Pérez Pitts MD   Levonorgestrel [facility]) IUD 19.5 mg 19.5 each by Intrauterine route Yes Historical Provider, MD   Cholecalciferol 50 MCG (2000 UT) TABS Take 2,000 Units by mouth nightly Yes Historical Provider, MD   fludrocortisone (FLORINEF) 0.1 MG tablet Take 1 tablet by mouth daily Take one pill once daily Yes Pérez Pitts MD   loratadine (CLARITIN) 10 MG capsule Take 10 mg by mouth daily Yes Historical Provider, MD   traMADol (ULTRAM) 50 MG tablet Take 50 mg by mouth 3 times daily as needed.   Patient not taking: Reported on 8/12/2021  Historical Provider, MD HYDROmorphone (DILAUDID) 2 MG tablet 2 mg every 6 hours as needed. Patient not taking: Reported on 8/12/2021  Historical Provider, MD   famotidine (PEPCID) 20 MG tablet Take 20 mg by mouth 2 times daily  Patient not taking: Reported on 8/12/2021  Historical Provider, MD   diazePAM (VALIUM) 5 MG tablet Take 5 mg by mouth every 8 hours as needed. Patient not taking: Reported on 8/12/2021  Historical Provider, MD        Allergies:  is allergic to reglan [metoclopramide], abilify [aripiprazole], and aloe vera. Past Medical History:   has a past medical history of Anxiety, Autism spectrum disorder, Bipolar disorder ([address] Depression, Dysmenorrhea, Christen-Danlos syndrome type III, GERD (gastroesophageal reflux disease), Headache, Menorrhagia, Postural orthostatic tachycardia syndrome, Syringomyelia ([address] and Urinary incontinence. Past Surgical History   has a past surgical history that includes Cholecystectomy and [place] tooth extraction (2019). Family History  family history includes Alcohol Abuse in his maternal grandfather and paternal uncle; Dementia in his maternal grandmother; Depression in his father and mother; Heart Attack in his maternal grandfather, maternal uncle, and paternal grandfather; Heart Disease in his maternal grandfather, maternal uncle, mother, paternal grandfather, and sister; High Cholesterol in his maternal grandfather and mother; Learning Disabilities in his brother; Mental Illness in his brother, father, mother, paternal aunt, and paternal uncle; [de-identified] / [place] in his mother; Other in his mother; Substance Abuse in his paternal aunt, paternal cousin, and paternal uncle. Social History   reports that he has never smoked. He has never used smokeless tobacco. He reports that he does not drink alcohol and does not use drugs.     Health Maintenance:    Health Maintenance   Topic Date Due    Hepatitis C screen  Never done    Varicella vaccine (1 of 2 - 2-dose childhood series) Never done    HPV vaccine (1 - 2-dose series) Never done    HIV screen  Never done    Chlamydia screen  Never done    DTaP/Tdap/Td vaccine (1 - Tdap) Never done    Flu vaccine (1) 09/01/2021    COVID-19 Vaccine  Completed    Hepatitis A vaccine  Aged Out    Hepatitis B vaccine  Aged Out    Hib vaccine  Aged Out    Meningococcal (ACWY) vaccine  Aged Out    Pneumococcal 0-64 years Vaccine  Aged Out       Subjective:      Review of Systems   Constitutional: Negative for fatigue, fever and unexpected weight change. HENT: Negative for ear pain, postnasal drip, rhinorrhea, sinus pain, sore throat and trouble swallowing. Eyes: Negative for visual disturbance. Respiratory: Negative for cough, chest tightness and shortness of breath. Cardiovascular: Negative for chest pain and leg swelling. Gastrointestinal: Negative for abdominal pain, blood in stool and diarrhea. Endocrine: Negative for polyuria. Genitourinary: Negative for difficulty urinating and flank pain. Musculoskeletal: Positive for arthralgias, gait problem and myalgias. Negative for joint swelling. Skin: Negative for rash. Allergic/Immunologic: Negative for environmental allergies. Neurological: Negative for weakness, light-headedness, numbness and headaches. Hematological: Negative for adenopathy. Psychiatric/Behavioral: Negative for behavioral problems and suicidal ideas. The patient is not nervous/anxious. Objective:     /82 (Site: Right Upper Arm, Position: Sitting, Cuff Size: Medium Adult)   Pulse 110   Temp 97.5 °F (36.4 °C) (Temporal)   Resp 12   Ht 5' 8\" (1.727 m)   Wt 129 lb 12.8 oz (58.9 kg)   SpO2 99%   BMI 19.74 kg/m²     Physical Exam  Vitals and nursing note reviewed. HENT:      Head: Normocephalic and atraumatic. Cardiovascular:      Rate and Rhythm: Normal rate and regular rhythm. Pulses: Normal pulses. Heart sounds: Normal heart sounds. No murmur heard.    No friction rub. No gallop. Pulmonary:      Effort: Pulmonary effort is normal. No respiratory distress. Breath sounds: Normal breath sounds. No stridor. No wheezing, rhonchi or rales. Chest:      Chest wall: No tenderness. Abdominal:      General: Abdomen is flat. Bowel sounds are normal. There is no distension. Palpations: Abdomen is soft. There is no mass. Tenderness: There is no abdominal tenderness. There is no right CVA tenderness, left CVA tenderness, guarding or rebound. Hernia: No hernia is present. Musculoskeletal:         General: Normal range of motion. Right lower leg: No edema. Left lower leg: No edema. Comments: Post surgical scars. Skin:     General: Skin is warm. Neurological:      General: No focal deficit present. Mental Status: He is oriented to person, place, and time. Mental status is at baseline. Motor: Weakness present. Gait: Gait abnormal.   Psychiatric:         Mood and Affect: Mood normal.             Assessment       Diagnosis Orders   1. Pre-op exam  EKG 12 Lead    CBC With Auto Differential    Comprehensive Metabolic Panel    Fructosamine    Ferritin    Iron and TIBC    Urinalysis With Microscopic    Hemoglobin A1C    Protime-INR    APTT    Vitamin D 25 Hydroxy   2.  Anemia, unspecified type  CBC With Auto Differential    Comprehensive Metabolic Panel    Fructosamine    Ferritin    Iron and TIBC    Urinalysis With Microscopic    Hemoglobin A1C    Protime-INR    APTT    Vitamin D 25 Hydroxy         PLAN   Patient is medically cleared for the surgery.      Known risk factors for perioperative complications: None  Current medications which may produce withdrawal symptoms if withheld perioperatively: yes     -Continue with Florinef as prescribed to prevent any form of orthostatic hypotension given her pots syndrome.     -Recommended holding baclofen and BuSpar/ Lamictal and Cymbalta at least 2 days before the surgery to prevent for any severe withdrawals as well as avoid any form of extreme sedation drowsiness central respiratory depression or profound sedation/interaction with general anesthetic drugs. Can resume most of her     1. Preoperative workup as follows:EKG looks stable normal sinus rhythm/no ischemic changes. Labs ordered today.   2. Change in medication regimen before surgery: Discontinue ASA 7 days before surgery, Discontinue NSAIDs () 7 days before surgery  3. Prophylaxis for cardiac events with perioperative beta-blockers: Not indicated  ACC/AHA indications for pre-operative beta-blocker use:    · Vascular surgery with history of postitive stress test  · Intermediate or high risk surgery with history of CAD   · Intermediate or high risk surgery with multiple clinical predictors of CAD- 2 of the following: history of compensated or prior heart failure, history of cerebrovascular disease, DM, or renal insufficiency     Routine administration of higher-dose, long-acting metoprolol in beta-blocker-naïve patients on the day of surgery, and in the absence of dose titration is associated with an overall increase in mortality.  Beta-blockers should be started days to weeks prior to surgery and titrated to pulse < 70.  4. Deep vein thrombosis prophylaxis: regimen to be chosen by surgical team  5. No contraindications to planned surgery  Orders Placed This Encounter   Procedures    CBC With Auto Differential     Standing Status:   Future     Standing Expiration Date:   8/12/2022    Comprehensive Metabolic Panel     Standing Status:   Future     Standing Expiration Date:   8/12/2022    Fructosamine     Standing Status:   Future     Standing Expiration Date:   8/12/2022    Ferritin     Standing Status:   Future     Standing Expiration Date:   8/12/2022    Iron and TIBC     Standing Status:   Future     Standing Expiration Date:   8/12/2022     Order Specific Question:   Is Patient Fasting?      Answer:   none     Order Specific Question: No of Hours? Answer:   none    Urinalysis With Microscopic     Standing Status:   Future     Standing Expiration Date:   8/12/2022     Order Specific Question:   SPECIFY(EX-CATH,MIDSTREAM,CYSTO,ETC)? Answer:   MID STREAM    Hemoglobin A1C     Standing Status:   Future     Standing Expiration Date:   8/12/2022    Protime-INR     Standing Status:   Future     Standing Expiration Date:   8/12/2022     Order Specific Question:   Daily Coumadin Dose? Answer:   NONE    APTT     Standing Status:   Future     Standing Expiration Date:   8/12/2022     Order Specific Question:   Daily Heparin Dose? Answer:   none    Vitamin D 25 Hydroxy     Standing Status:   Future     Standing Expiration Date:   8/12/2022    EKG 12 Lead     Order Specific Question:   Reason for Exam?     Answer: Other     Order Specific Question:   Reason for Exam?     Answer:   Pre-op        No follow-ups on file. Patient given educational materials - see patient instructions. Discussed use, benefit, and side effects of prescribed medications. All patient questions answered. Pt voiced understanding. Reviewed health maintenance.        Electronically signed Michell Arias MD on 8/12/2021 at 10:46 AM EDT

## 2021-08-16 ASSESSMENT — ENCOUNTER SYMPTOMS
ABDOMINAL PAIN: 0
SORE THROAT: 0
SINUS PAIN: 0
CHEST TIGHTNESS: 0
BLOOD IN STOOL: 0
RHINORRHEA: 0
COUGH: 0
SHORTNESS OF BREATH: 0
DIARRHEA: 0
TROUBLE SWALLOWING: 0

## 2021-08-17 DIAGNOSIS — E87.6 HYPOKALEMIA: Primary | ICD-10-CM

## 2021-08-17 DIAGNOSIS — D64.9 ANEMIA, UNSPECIFIED TYPE: ICD-10-CM

## 2021-08-17 DIAGNOSIS — Z01.818 PRE-OP EXAM: ICD-10-CM

## 2021-08-17 RX ORDER — POTASSIUM CHLORIDE 20 MEQ/1
20 TABLET, EXTENDED RELEASE ORAL 2 TIMES DAILY
Qty: 60 TABLET | Refills: 1 | Status: SHIPPED | OUTPATIENT
Start: 2021-08-17 | End: 2021-12-09 | Stop reason: ALTCHOICE

## 2021-09-11 DIAGNOSIS — N80.9 ENDOMETRIOSIS: ICD-10-CM

## 2021-09-11 DIAGNOSIS — F41.9 ANXIETY: ICD-10-CM

## 2021-09-11 DIAGNOSIS — S73.191D TEAR OF RIGHT ACETABULAR LABRUM, SUBSEQUENT ENCOUNTER: ICD-10-CM

## 2021-09-11 DIAGNOSIS — F48.9 MOOD PROBLEM: ICD-10-CM

## 2021-09-11 DIAGNOSIS — G90.A POTS (POSTURAL ORTHOSTATIC TACHYCARDIA SYNDROME): ICD-10-CM

## 2021-09-11 DIAGNOSIS — F32.A DEPRESSION, UNSPECIFIED DEPRESSION TYPE: ICD-10-CM

## 2021-09-11 DIAGNOSIS — Q79.60 EHLERS-DANLOS SYNDROME: ICD-10-CM

## 2021-09-11 DIAGNOSIS — G95.0 SYRINGOMYELIA (HCC): ICD-10-CM

## 2021-09-11 DIAGNOSIS — N94.6 DYSMENORRHEA: ICD-10-CM

## 2021-09-13 RX ORDER — BUSPIRONE HYDROCHLORIDE 10 MG/1
TABLET ORAL
Qty: 60 TABLET | Refills: 2 | Status: SHIPPED | OUTPATIENT
Start: 2021-09-13 | End: 2021-12-20

## 2021-09-13 RX ORDER — LAMOTRIGINE 100 MG/1
TABLET ORAL
Qty: 30 TABLET | Refills: 2 | Status: SHIPPED | OUTPATIENT
Start: 2021-09-13 | End: 2021-12-16

## 2021-09-13 NOTE — TELEPHONE ENCOUNTER
Mai Do is calling to request a refill on the following medication(s):  Requested Prescriptions     Pending Prescriptions Disp Refills    lamoTRIgine (LAMICTAL) 100 MG tablet [Pharmacy Med Name: LAMOTRIGINE 100 MG TABLET] 30 tablet 2     Sig: take 1 tablet by mouth once daily    busPIRone (BUSPAR) 10 MG tablet [Pharmacy Med Name: BUSPIRONE HCL 10 MG TABLET] 60 tablet 2     Sig: take 1 tablet by mouth twice a day       Last Visit Date (If Applicable):  9/1/4964    Next Visit Date:    Visit date not found

## 2021-09-14 ENCOUNTER — OFFICE VISIT (OUTPATIENT)
Dept: FAMILY MEDICINE CLINIC | Age: 21
End: 2021-09-14
Payer: COMMERCIAL

## 2021-09-14 VITALS
DIASTOLIC BLOOD PRESSURE: 80 MMHG | HEART RATE: 109 BPM | WEIGHT: 154 LBS | TEMPERATURE: 98.1 F | OXYGEN SATURATION: 96 % | SYSTOLIC BLOOD PRESSURE: 140 MMHG | BODY MASS INDEX: 23.42 KG/M2

## 2021-09-14 DIAGNOSIS — Z48.89 ENCOUNTER FOR POST SURGICAL WOUND CHECK: ICD-10-CM

## 2021-09-14 DIAGNOSIS — D64.9 ANEMIA, UNSPECIFIED TYPE: ICD-10-CM

## 2021-09-14 DIAGNOSIS — E87.6 HYPOKALEMIA: Primary | ICD-10-CM

## 2021-09-14 PROCEDURE — G8428 CUR MEDS NOT DOCUMENT: HCPCS | Performed by: INTERNAL MEDICINE

## 2021-09-14 PROCEDURE — 99214 OFFICE O/P EST MOD 30 MIN: CPT | Performed by: INTERNAL MEDICINE

## 2021-09-14 PROCEDURE — G8420 CALC BMI NORM PARAMETERS: HCPCS | Performed by: INTERNAL MEDICINE

## 2021-09-14 PROCEDURE — 1036F TOBACCO NON-USER: CPT | Performed by: INTERNAL MEDICINE

## 2021-09-14 RX ORDER — IBUPROFEN 800 MG/1
800 TABLET ORAL EVERY 8 HOURS
COMMUNITY
End: 2021-11-19 | Stop reason: SINTOL

## 2021-09-14 NOTE — PROGRESS NOTES
[address]  [address] [place] [id number]  Dept: [phone]  Dept Fax: [phone]  Loc: [phone]     Visit Date:  9/14/2021    Patient:  Thad Burns  YOB: 2000    HPI:   Thad Burns presents today for   Chief Complaint   Patient presents with    Other      dressing to hip surgery draining and believes is seperating due to swelling surgery was done 1 week ago    . 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 Is accompanied by her mother today. Patient believes that her skin seems to be  due to swelling. The bandages were taken off at least from one incision site and the skin seems to be intact crusted and there is very minimal drainage if any on the dressing itself. I did not see any skin separation or wound dehiscence. The skin around the incision sides seems to be tensed and swollen which is expected but the pulses are intact she does have a little bit of tingling-like sensations on the lateral part of her thigh/up to the knee area. No profound signs and symptoms concerning for vascular compromise and/or compartment syndrome or surgical site being infected. Medications  Prior to Visit Medications    Medication Sig Taking?  Authorizing Provider   lamoTRIgine (LAMICTAL) 100 MG tablet take 1 tablet by mouth once daily  Lacho Gallardo MD   busPIRone (BUSPAR) 10 MG tablet take 1 tablet by mouth twice a day  Lacho Gallardo MD   potassium chloride (KLOR-CON M) 20 MEQ extended release tablet Take 1 tablet by mouth 2 times daily  Lacho Gallardo MD   gabapentin (NEURONTIN) 300 MG capsule Take 300 mg by mouth 3 times daily. Take 2 at hs. Historical Provider, MD   traMADol (ULTRAM) 50 MG tablet Take 50 mg by mouth 3 times daily as needed. Patient not taking: Reported on 8/12/2021  Historical Provider, MD   HYDROmorphone (DILAUDID) 2 MG tablet 2 mg every 6 hours as needed. Patient not taking: Reported on 8/12/2021  Historical Provider, MD   famotidine (PEPCID) 20 MG tablet Take 20 mg by mouth 2 times daily  Patient not taking: Reported on 8/12/2021  Historical Provider, MD   diazePAM (VALIUM) 5 MG tablet Take 5 mg by mouth every 8 hours as needed. Patient not taking: Reported on 8/12/2021  Historical Provider, MD   baclofen (LIORESAL) 10 MG tablet Take 1 tablet by mouth 2 times daily  Alfredo Copeland MD   DULoxetine (CYMBALTA) 30 MG extended release capsule Take 1 capsule by mouth 2 times daily Take 1 pill twice daily  Alfredo Copeland MD   Levonorgestrel [facility] [place]) IUD 19.5 mg 19.5 each by Intrauterine route  Historical Provider, MD   Cholecalciferol 50 MCG (2000 UT) TABS Take 2,000 Units by mouth nightly  Historical Provider, MD   fludrocortisone (FLORINEF) 0.1 MG tablet Take 1 tablet by mouth daily Take one pill once daily  Alfredo Copeland MD   loratadine (CLARITIN) 10 MG capsule Take 10 mg by mouth daily  Historical Provider, MD        Allergies:  is allergic to reglan [metoclopramide], abilify [aripiprazole], and aloe vera. Past Medical History:   has a past medical history of Anxiety, Autism spectrum disorder, Bipolar disorder ([address] Depression, Dysmenorrhea, Christen-Danlos syndrome type III, GERD (gastroesophageal reflux disease), Headache, Menorrhagia, Postural orthostatic tachycardia syndrome, Syringomyelia ([address] and Urinary incontinence. Past Surgical History   has a past surgical history that includes Cholecystectomy and [place] tooth extraction (2019).     Family History  family history includes Alcohol Abuse in his maternal grandfather and paternal uncle; Dementia in his maternal grandmother; Depression in his father and mother; Heart Attack in his maternal grandfather, maternal uncle, and paternal grandfather; Heart Disease in his maternal grandfather, maternal uncle, mother, paternal grandfather, and sister; High Cholesterol in his maternal grandfather and mother; Learning Disabilities in his brother; Mental Illness in his brother, father, mother, paternal aunt, and paternal uncle; [place] / [place] in his mother; Other in his mother; Substance Abuse in his paternal aunt, paternal cousin, and paternal uncle. Social History   reports that he has never smoked. He has never used smokeless tobacco. He reports that he does not drink alcohol and does not use drugs. Health Maintenance:    Health Maintenance   Topic Date Due    Hepatitis C screen  Never done    Varicella vaccine (1 of 2 - 2-dose childhood series) Never done    HPV vaccine (1 - 2-dose series) Never done    HIV screen  Never done    Chlamydia screen  Never done    DTaP/Tdap/Td vaccine (1 - Tdap) Never done    Flu vaccine (1) 09/01/2021    COVID-19 Vaccine  Completed    Hepatitis A vaccine  Aged Out    Hepatitis B vaccine  Aged Out    Hib vaccine  Aged Out    Meningococcal (ACWY) vaccine  Aged Out    Pneumococcal 0-64 years Vaccine  Aged Out       Subjective:      Review of Systems   Constitutional: Negative for fatigue, fever and unexpected weight change. HENT: Negative for ear pain, postnasal drip, rhinorrhea, sinus pain, sore throat and trouble swallowing. Eyes: Negative for visual disturbance. Respiratory: Negative for cough, chest tightness and shortness of breath. Cardiovascular: Positive for leg swelling. Negative for chest pain. Gastrointestinal: Negative for abdominal pain, blood in stool and diarrhea. Endocrine: Negative for polyuria. Genitourinary: Negative for difficulty urinating and flank pain. Musculoskeletal: Negative for arthralgias, joint swelling and myalgias.    Skin: Positive for wound. Negative for rash. Allergic/Immunologic: Negative for environmental allergies. Neurological: Negative for weakness, light-headedness, numbness and headaches. Hematological: Negative for adenopathy. Psychiatric/Behavioral: Negative for behavioral problems and suicidal ideas. The patient is not nervous/anxious. Objective:     BP (!) 140/80   Pulse 109   Temp 98.1 °F (36.7 °C)   Wt 154 lb (69.9 kg)   SpO2 96%   BMI 23.42 kg/m²     Physical Exam  Vitals and nursing note reviewed. HENT:      Head: Normocephalic and atraumatic. Cardiovascular:      Rate and Rhythm: Normal rate and regular rhythm. Pulses: Normal pulses. Heart sounds: Normal heart sounds. Pulmonary:      Effort: Pulmonary effort is normal.      Breath sounds: No stridor. Musculoskeletal:         General: Swelling and tenderness present. Left lower leg: Tenderness present. Edema present. Legs:       Comments: Surgical incision site(only one of the site opened up)- shows no signs of wound dehiscience. Very minimal yellow color drainage noted with crusted of the skin around the wound. Femoral pulses/distal pulses preserved  Diffuse edema/tensing of the muscles of the LLE /      Skin:     General: Skin is warm. Neurological:      Mental Status: He is oriented to person, place, and time. Mental status is at baseline. Psychiatric:         Mood and Affect: Mood normal.             Assessment       Diagnosis Orders   1. Hypokalemia  CBC With Auto Differential    Comprehensive Metabolic Panel   2. Anemia, unspecified type  CBC With Auto Differential    Comprehensive Metabolic Panel   3. Encounter for post surgical wound check           PLAN   Recheck electrolytes/anemia. Educated on signs and symptoms of compartment syndrome/wound infection-hands outs provided. Follow up in less than 1 week with surgery.      Orders Placed This Encounter   Procedures    CBC With Auto Differential Standing Status:   Future     Standing Expiration Date:   9/14/2022    Comprehensive Metabolic Panel     Standing Status:   Future     Standing Expiration Date:   9/14/2022        No follow-ups on file. Patient given educational materials - see patient instructions. Discussed use, benefit, and side effects of prescribed medications. All patient questions answered. Pt voiced understanding. Reviewed health maintenance.        Electronically signed Krystal Miranda MD on 9/14/2021 at 10:31 AM EDT

## 2021-09-20 DIAGNOSIS — S73.191D TEAR OF RIGHT ACETABULAR LABRUM, SUBSEQUENT ENCOUNTER: ICD-10-CM

## 2021-09-20 DIAGNOSIS — G95.0 SYRINGOMYELIA (HCC): ICD-10-CM

## 2021-09-20 DIAGNOSIS — F48.9 MOOD PROBLEM: ICD-10-CM

## 2021-09-20 DIAGNOSIS — F41.9 ANXIETY: ICD-10-CM

## 2021-09-20 DIAGNOSIS — N94.6 DYSMENORRHEA: ICD-10-CM

## 2021-09-20 DIAGNOSIS — F32.A DEPRESSION, UNSPECIFIED DEPRESSION TYPE: ICD-10-CM

## 2021-09-20 DIAGNOSIS — G90.A POTS (POSTURAL ORTHOSTATIC TACHYCARDIA SYNDROME): ICD-10-CM

## 2021-09-20 DIAGNOSIS — N80.9 ENDOMETRIOSIS: ICD-10-CM

## 2021-09-20 DIAGNOSIS — Q79.60 EHLERS-DANLOS SYNDROME: ICD-10-CM

## 2021-09-20 ASSESSMENT — ENCOUNTER SYMPTOMS
BLOOD IN STOOL: 0
TROUBLE SWALLOWING: 0
SHORTNESS OF BREATH: 0
ABDOMINAL PAIN: 0
CHEST TIGHTNESS: 0
COUGH: 0
RHINORRHEA: 0
SORE THROAT: 0
SINUS PAIN: 0
DIARRHEA: 0

## 2021-09-21 RX ORDER — DULOXETIN HYDROCHLORIDE 30 MG/1
CAPSULE, DELAYED RELEASE ORAL
Qty: 60 CAPSULE | Refills: 5 | Status: SHIPPED | OUTPATIENT
Start: 2021-09-21 | End: 2022-03-28 | Stop reason: SDUPTHER

## 2021-09-21 NOTE — TELEPHONE ENCOUNTER
Thad Burns is requesting a refill on the following medication(s):  Requested Prescriptions     Pending Prescriptions Disp Refills    DULoxetine (CYMBALTA) 30 MG extended release capsule [Pharmacy Med Name: DULOXETINE HCL DR 30 MG CAP] 30 capsule      Sig: take 1 capsule by mouth twice a day       Last Visit Date (If Applicable):  3/75/3943    Next Visit Date:    10/5/2021

## 2021-09-25 ENCOUNTER — OFFICE VISIT (OUTPATIENT)
Dept: FAMILY MEDICINE CLINIC | Age: 21
End: 2021-09-25
Payer: COMMERCIAL

## 2021-09-25 ENCOUNTER — HOSPITAL ENCOUNTER (OUTPATIENT)
Age: 21
Setting detail: SPECIMEN
Discharge: HOME | End: 2021-09-25
Payer: COMMERCIAL

## 2021-09-25 DIAGNOSIS — N39.46 MIXED STRESS AND URGE URINARY INCONTINENCE: ICD-10-CM

## 2021-09-25 DIAGNOSIS — D64.9 ANEMIA, UNSPECIFIED TYPE: ICD-10-CM

## 2021-09-25 DIAGNOSIS — R30.0 DYSURIA: ICD-10-CM

## 2021-09-25 DIAGNOSIS — R35.0 URINE FREQUENCY: Primary | ICD-10-CM

## 2021-09-25 DIAGNOSIS — Z01.818 PRE-OP EXAM: ICD-10-CM

## 2021-09-25 LAB
-: ABNORMAL
AMORPHOUS: ABNORMAL
BACTERIA: ABNORMAL
BILIRUBIN URINE: NEGATIVE
BILIRUBIN, POC: 1
BLOOD URINE, POC: ABNORMAL
CASTS UA: ABNORMAL /LPF (ref 0–2)
CLARITY, POC: CLEAR
COLOR, POC: YELLOW
COLOR: ABNORMAL
COMMENT UA: ABNORMAL
CRYSTALS, UA: ABNORMAL /HPF
EPITHELIAL CELLS UA: ABNORMAL /HPF (ref 0–5)
GLUCOSE URINE, POC: ABNORMAL
GLUCOSE URINE: NEGATIVE
KETONES, POC: ABNORMAL
KETONES, URINE: ABNORMAL
LEUKOCYTE EST, POC: ABNORMAL
LEUKOCYTE ESTERASE, URINE: NEGATIVE
MUCUS: ABNORMAL
NITRITE, POC: ABNORMAL
NITRITE, URINE: NEGATIVE
OTHER OBSERVATIONS UA: ABNORMAL
PH UA: 5.5 (ref 5–6)
PH, POC: 5.5
PROTEIN UA: NEGATIVE
PROTEIN, POC: ABNORMAL
RBC UA: ABNORMAL /HPF (ref 0–4)
RENAL EPITHELIAL, UA: ABNORMAL /HPF
SPECIFIC GRAVITY UA: 1.03 (ref 1.01–1.02)
SPECIFIC GRAVITY, POC: 1.03
TRICHOMONAS: ABNORMAL
TURBIDITY: ABNORMAL
URINE HGB: NEGATIVE
UROBILINOGEN, POC: 1
UROBILINOGEN, URINE: NORMAL
WBC UA: ABNORMAL /HPF (ref 0–4)
YEAST: ABNORMAL

## 2021-09-25 PROCEDURE — 81001 URINALYSIS AUTO W/SCOPE: CPT

## 2021-09-25 PROCEDURE — 1036F TOBACCO NON-USER: CPT | Performed by: FAMILY MEDICINE

## 2021-09-25 PROCEDURE — G8420 CALC BMI NORM PARAMETERS: HCPCS | Performed by: FAMILY MEDICINE

## 2021-09-25 PROCEDURE — 99213 OFFICE O/P EST LOW 20 MIN: CPT | Performed by: FAMILY MEDICINE

## 2021-09-25 PROCEDURE — 81002 URINALYSIS NONAUTO W/O SCOPE: CPT | Performed by: FAMILY MEDICINE

## 2021-09-25 PROCEDURE — G8427 DOCREV CUR MEDS BY ELIG CLIN: HCPCS | Performed by: FAMILY MEDICINE

## 2021-09-25 NOTE — PROGRESS NOTES
[address] [facility]  [address]  Dept: [phone]  Dept BNE:[phone]    Karis Reynaga is a 24 y.o. adult who presents today for his medical conditions/complaints as notedbelow. Karis Reynaga is c/o of Urinary Tract Infection      HPI:     Has had intermittent episodes of complete incontinence without awareness- also has decreased sensation in the perineum since her surgery. This is improving slightly. Urinary Tract Infection   This is a new problem. The current episode started 1 to 4 weeks ago (since her hip surgery has had intermittent episodes of incontinence as well as burning with urination). The problem has been waxing and waning. Pain scale: is on pain medications. The patient is experiencing no pain. There has been no fever. He is not sexually active. There is no history of pyelonephritis. Associated symptoms include hesitancy. Pertinent negatives include no chills, hematuria or sweats. He has tried acetaminophen, NSAIDs and increased fluids for the symptoms. The treatment provided no relief. His past medical history is significant for catheterization ([place] catheter several days in the early part of September with her hip surgery). There is no history of recurrent UTIs.          BP Readings from Last 3 Encounters:   09/14/21 (!) 140/80   08/12/21 110/82   06/04/21 132/82          (goal 120/80)    Wt Readings from Last 3 Encounters:   09/14/21 154 lb (69.9 kg)   08/12/21 129 lb 12.8 oz (58.9 kg)   06/04/21 129 lb (58.5 kg)        Past Medical History:   Diagnosis Date    Anxiety     Autism spectrum disorder 2019    Bipolar disorder ([address]     Depression     Dysmenorrhea     Christen-Danlos syndrome type III 2019    GERD (gastroesophageal reflux disease)     Headache     Menorrhagia     Postural orthostatic tachycardia syndrome 2014    Syringomyelia ([address] 2019    Urinary incontinence       Past Surgical History:   Procedure Laterality Date    CHOLECYSTECTOMY      WISDOM TOOTH EXTRACTION  2019       Family History   Problem Relation Age of Onset    Heart Disease Mother     Other Mother     Mental Illness Mother    [de-identified] / Stillbirths Mother     Depression Mother     High Cholesterol Mother     Mental Illness Father     Depression Father     Heart Disease Sister     Mental Illness Brother     Learning Disabilities Brother     Heart Attack Maternal Uncle     Heart Disease Maternal Uncle     Mental Illness Paternal Aunt     Substance Abuse Paternal Aunt     Mental Illness Paternal Uncle     Substance Abuse Paternal Uncle     Alcohol Abuse Paternal Uncle     Dementia Maternal Grandmother     Heart Attack Maternal Grandfather     Heart Disease Maternal Grandfather     High Cholesterol Maternal Grandfather     Alcohol Abuse Maternal Grandfather     Heart Attack Paternal Grandfather     Heart Disease Paternal Grandfather     Substance Abuse Paternal Cousin        Social History     Tobacco Use    Smoking status: Never Smoker    Smokeless tobacco: Never Used   Substance Use Topics    Alcohol use: Never      Current Outpatient Medications   Medication Sig Dispense Refill    DULoxetine (CYMBALTA) 30 MG extended release capsule take 1 capsule by mouth twice a day 60 capsule 5    ibuprofen (ADVIL;MOTRIN) 800 MG tablet Take 800 mg by mouth every 8 hours      lamoTRIgine (LAMICTAL) 100 MG tablet take 1 tablet by mouth once daily 30 tablet 2    busPIRone (BUSPAR) 10 MG tablet take 1 tablet by mouth twice a day 60 tablet 2    potassium chloride (KLOR-CON M) 20 MEQ extended release tablet Take 1 tablet by mouth 2 times daily 60 tablet 1    gabapentin (NEURONTIN) 300 MG capsule Take 300 mg by mouth 3 times daily. Take 2 at hs.  traMADol (ULTRAM) 50 MG tablet Take 50 mg by mouth 3 times daily as needed.  HYDROmorphone (DILAUDID) 2 MG tablet 2 mg every 4 hours as needed.        famotidine (PEPCID) 20 MG tablet Take 20 mg by mouth 2 times daily       diazePAM (VALIUM) 5 MG tablet Take 5 mg by mouth every 8 hours as needed.  baclofen (LIORESAL) 10 MG tablet Take 1 tablet by mouth 2 times daily 60 tablet 3    Levonorgestrel (KYLEENA) IUD 19.5 mg 19.5 each by Intrauterine route      Cholecalciferol 50 MCG (2000 UT) TABS Take 2,000 Units by mouth nightly      fludrocortisone (FLORINEF) 0.1 MG tablet Take 1 tablet by mouth daily Take one pill once daily 90 tablet 3    loratadine (CLARITIN) 10 MG capsule Take 10 mg by mouth daily       No current facility-administered medications for this visit. Allergies   Allergen Reactions    Reglan [Metoclopramide]      Patient states it causes eps.  Abilify [Aripiprazole]      Patient states this medication gives her tremors and shaking.  Aloe Vera Rash       Health Maintenance   Topic Date Due    Hepatitis C screen  Never done    Varicella vaccine (1 of 2 - 2-dose childhood series) Never done    HPV vaccine (1 - 2-dose series) Never done    HIV screen  Never done    Chlamydia screen  Never done    DTaP/Tdap/Td vaccine (1 - Tdap) Never done    Flu vaccine (1) 09/01/2021    Pap smear  Never done    COVID-19 Vaccine  Completed    Hepatitis A vaccine  Aged Out    Hepatitis B vaccine  Aged Out    Hib vaccine  Aged Out    Meningococcal (ACWY) vaccine  Aged Out    Pneumococcal 0-64 years Vaccine  Aged Out       Subjective:      Review of Systems   Constitutional: Negative for chills. Genitourinary: Positive for hesitancy. Negative for hematuria. Objective: There were no vitals taken for this visit. Physical Exam  Vitals and nursing note reviewed. Constitutional:       Appearance: Normal appearance. Cardiovascular:      Rate and Rhythm: Normal rate and regular rhythm. Heart sounds: Normal heart sounds. Pulmonary:      Effort: Pulmonary effort is normal.   Abdominal:      Palpations: Abdomen is soft. There is no mass. Tenderness: There is no abdominal tenderness. There is no right CVA tenderness or left CVA tenderness. Neurological:      Mental Status: He is alert. Assessment/Plan:     1. Urine frequency  -     POCT Urinalysis no Micro  2. Dysuria  -     Urinalysis Reflex to Culture; Future  3. Mixed stress and urge urinary incontinence  -     Urinalysis Reflex to Culture; Future    Will check to make ensure no infection with the recent catheter and the hardware in place. Continue to push the fluids and have regular bathroom breaks. Lab Results   Component Value Date    WBC 4.5 06/04/2021    HGB 12.5 06/04/2021    HCT 39.8 06/04/2021     06/04/2021    CHOL 168 09/17/2020    TRIG 53 09/17/2020    HDL 64 09/17/2020    ALT 17 08/12/2021    AST 25 08/12/2021     08/12/2021    K 3.1 08/12/2021     08/26/2020    CREATININE 0.5 08/12/2021    BUN 7 08/12/2021    CO2 26 08/12/2021    LABA1C 5 08/12/2021       No follow-ups on file. Patient given educational materials - see patientinstructions. Discussed use, benefit, and side effects of prescribed medications. All patient questions answered. Pt voiced understanding. Reviewed health maintenance. Instructed to continue current medications, diet andexercise. Patient agreed with treatment plan. Follow up as directed.      (Please note that portions of this note were completed with a voice-recognition program. Efforts were made to edit the dictation but occasionally words are mis-transcribed.)    Electronically signed by Betzy Wang MD on 9/26/2021

## 2021-09-26 VITALS — TEMPERATURE: 98.6 F

## 2021-10-06 ENCOUNTER — OFFICE VISIT (OUTPATIENT)
Dept: FAMILY MEDICINE CLINIC | Age: 21
End: 2021-10-06
Payer: COMMERCIAL

## 2021-10-06 VITALS
HEART RATE: 110 BPM | OXYGEN SATURATION: 98 % | TEMPERATURE: 98 F | DIASTOLIC BLOOD PRESSURE: 70 MMHG | SYSTOLIC BLOOD PRESSURE: 100 MMHG | WEIGHT: 130 LBS | BODY MASS INDEX: 19.77 KG/M2

## 2021-10-06 DIAGNOSIS — G89.29 CHRONIC LEFT-SIDED LOW BACK PAIN WITHOUT SCIATICA: ICD-10-CM

## 2021-10-06 DIAGNOSIS — M54.50 CHRONIC LEFT-SIDED LOW BACK PAIN WITHOUT SCIATICA: ICD-10-CM

## 2021-10-06 DIAGNOSIS — R00.0 TACHYCARDIA: Primary | ICD-10-CM

## 2021-10-06 PROCEDURE — G8427 DOCREV CUR MEDS BY ELIG CLIN: HCPCS | Performed by: INTERNAL MEDICINE

## 2021-10-06 PROCEDURE — G8484 FLU IMMUNIZE NO ADMIN: HCPCS | Performed by: INTERNAL MEDICINE

## 2021-10-06 PROCEDURE — G8420 CALC BMI NORM PARAMETERS: HCPCS | Performed by: INTERNAL MEDICINE

## 2021-10-06 PROCEDURE — 99214 OFFICE O/P EST MOD 30 MIN: CPT | Performed by: INTERNAL MEDICINE

## 2021-10-06 PROCEDURE — 1036F TOBACCO NON-USER: CPT | Performed by: INTERNAL MEDICINE

## 2021-10-06 NOTE — PROGRESS NOTES
[address] [address] [place] [address]  Dept: [phone]  Dept Fax: [phone]  Loc: [phone]     Visit Date:  10/6/2021    Patient:  Tono Obregon  YOB: 2000    HPI:   Tono Obregon presents today for   Chief Complaint   Patient presents with    Follow Up After Procedure     patient here today follow up on hip  surgery c/o back pain nothing seems to help    . HPI 24year old male is coming in 4 weeks post op left hip labral repair, femoral neck osteoplasty, open periacetabular osteotomy surgery. She reports she seems to be recovering fine started physical therapy yesterday and water aquatic therapy seems to be helping. But the main problem is the low back pain likely referred pain from the left hip seems to be bothersome. Has tried weaning herself off the pain meds, does ibuprofen and Ice for intermittent breakthrough pain. 3 days in a row she has not been sleeping well due to the pain. Pain level is 8-9/10. Takes tramadol at bedtime and sometimes getting up due to the pain around 3-4 AM .Off dilaudid and still has 20 pills remaining of tramadol. Seems not find to find a comfortable position, keeps thinking about, sometimes feels pessimistic about things. The pain seems to be affecting the mood and feels like this time post surgery recovery has been slow . Pain seems to be affecting physically and mentally. Worried about knees given the anatomy and mechanics of how hip dysplasia has affected and might have resulted in some damage around the knees. Using heat pads sometime helps with the knee. Using back brace and crutches. Medications  Prior to Visit Medications    Medication Sig Taking?  Authorizing Provider   baclofen (LIORESAL) 10 MG tablet Take 1 tablet by mouth 2 times daily Yes Earnesteen Hodgkin, MD   DULoxetine (CYMBALTA) 30 MG extended release capsule take 1 capsule by mouth twice a day Yes Tre Llanos MD   ibuprofen (ADVIL;MOTRIN) 800 MG tablet Take 800 mg by mouth every 8 hours Yes Historical Provider, MD   lamoTRIgine (LAMICTAL) 100 MG tablet take 1 tablet by mouth once daily Yes Tre Llanos MD   busPIRone (BUSPAR) 10 MG tablet take 1 tablet by mouth twice a day Yes Tre Llanos MD   potassium chloride (KLOR-CON M) 20 MEQ extended release tablet Take 1 tablet by mouth 2 times daily Yes Tre Llanos MD   gabapentin (NEURONTIN) 300 MG capsule Take 300 mg by mouth 3 times daily. Take 2 at hs. Yes Historical Provider, MD   traMADol (ULTRAM) 50 MG tablet Take 50 mg by mouth 3 times daily as needed. Yes Historical Provider, MD   famotidine (PEPCID) 20 MG tablet Take 20 mg by mouth 2 times daily  Yes Historical Provider, MD   Levonorgestrel [facility]) IUD 19.5 mg 19.5 each by Intrauterine route Yes Historical Provider, MD   Cholecalciferol 50 MCG (2000 UT) TABS Take 2,000 Units by mouth nightly Yes Historical Provider, MD   fludrocortisone (FLORINEF) 0.1 MG tablet Take 1 tablet by mouth daily Take one pill once daily Yes Tre Llanos MD   loratadine (CLARITIN) 10 MG capsule Take 10 mg by mouth daily Yes Historical Provider, MD   HYDROmorphone (DILAUDID) 2 MG tablet 2 mg every 4 hours as needed. Patient not taking: Reported on 10/6/2021  Historical Provider, MD   diazePAM (VALIUM) 5 MG tablet Take 5 mg by mouth every 8 hours as needed. Patient not taking: Reported on 10/6/2021  Historical Provider, MD        Allergies:  is allergic to reglan [metoclopramide], abilify [aripiprazole], and aloe vera. Past Medical History:   has a past medical history of Anxiety, Autism spectrum disorder, Bipolar disorder ([facility] [address] Depression, Dysmenorrhea, Christen-Danlos syndrome type III, GERD (gastroesophageal reflux disease), Headache, Menorrhagia, Postural orthostatic tachycardia syndrome, Syringomyelia ([facility] [address] and Urinary incontinence.     Past Surgical History   has a past surgical history that includes Cholecystectomy and [place] tooth extraction (2019). Family History  family history includes Alcohol Abuse in his maternal grandfather and paternal uncle; Dementia in his maternal grandmother; Depression in his father and mother; Heart Attack in his maternal grandfather, maternal uncle, and paternal grandfather; Heart Disease in his maternal grandfather, maternal uncle, mother, paternal grandfather, and sister; High Cholesterol in his maternal grandfather and mother; Learning Disabilities in his brother; Mental Illness in his brother, father, mother, paternal aunt, and paternal uncle; [de-identified] / [place] in his mother; Other in his mother; Substance Abuse in his paternal aunt, paternal cousin, and paternal uncle. Social History   reports that he has never smoked. He has never used smokeless tobacco. He reports that he does not drink alcohol and does not use drugs. Health Maintenance:    Health Maintenance   Topic Date Due    Hepatitis C screen  Never done    Varicella vaccine (1 of 2 - 2-dose childhood series) Never done    HPV vaccine (1 - 2-dose series) Never done    HIV screen  Never done    Chlamydia screen  Never done    DTaP/Tdap/Td vaccine (1 - Tdap) Never done    Flu vaccine (1) 09/01/2021    Pap smear  Never done    COVID-19 Vaccine  Completed    Hepatitis A vaccine  Aged Out    Hepatitis B vaccine  Aged Out    Hib vaccine  Aged Out    Meningococcal (ACWY) vaccine  Aged Out    Pneumococcal 0-64 years Vaccine  Aged Out       Subjective:      Review of Systems   Constitutional: Negative for fatigue, fever and unexpected weight change. HENT: Negative for ear pain, postnasal drip, rhinorrhea, sinus pain, sore throat and trouble swallowing. Eyes: Negative for visual disturbance. Respiratory: Negative for cough, chest tightness and shortness of breath. Cardiovascular: Negative for chest pain and leg swelling.    Gastrointestinal: Negative for abdominal pain, blood in stool and diarrhea. Endocrine: Negative for polyuria. Genitourinary: Negative for difficulty urinating and flank pain. Musculoskeletal: Positive for arthralgias, back pain, gait problem and myalgias. Negative for joint swelling. Skin: Negative for rash. Allergic/Immunologic: Negative for environmental allergies. Neurological: Positive for dizziness and light-headedness. Negative for weakness, numbness and headaches. Hematological: Negative for adenopathy. Psychiatric/Behavioral: Negative for behavioral problems and suicidal ideas. The patient is not nervous/anxious. Objective:     /70   Pulse 110   Temp 98 °F (36.7 °C)   Wt 130 lb (59 kg)   SpO2 98%   BMI 19.77 kg/m²     Physical Exam  Vitals and nursing note reviewed. HENT:      Head: Normocephalic and atraumatic. Cardiovascular:      Rate and Rhythm: Regular rhythm. Tachycardia present. Pulses: Normal pulses. Pulmonary:      Effort: Pulmonary effort is normal.      Breath sounds: Normal breath sounds. No stridor. Abdominal:      Palpations: Abdomen is soft. Skin:     General: Skin is warm. Findings: Erythema and lesion present. Comments: Left surgical site examined- she did noticed some blood and feels like wound edges might be coming apart. Swelling markedly down. Incisional wounds seems to be healing well with closed wound edges. Neurological:      Mental Status: He is oriented to person, place, and time. Mental status is at baseline. Psychiatric:         Mood and Affect: Mood normal.             Assessment       Diagnosis Orders   1. Tachycardia     2. Chronic left-sided low back pain without sciatica           PLAN   Left lower lateral left sided back pain likely referred pain from the hip. Now do the best in terms of PT/H20 therapy with use of assistive devices/back brace, pain control with pills/ice and heat.  In 2 weeks she will be seeing ortho and getting X rays done. She got dizzy likely due to her POTS /tachycardia- fall prevention education provided. Continue to use band aid/wound closure band aids just in case if it starts seeping blood again and might go a bit gentle with the stretches. .      No orders of the defined types were placed in this encounter. No follow-ups on file. Patient given educational materials - see patient instructions. Discussed use, benefit, and side effects of prescribed medications. All patient questions answered. Pt voiced understanding. Reviewed health maintenance.        Electronically signed Inocencio Goodman MD on 10/6/2021 at 11:23 AM EDT

## 2021-10-10 ASSESSMENT — ENCOUNTER SYMPTOMS
BACK PAIN: 1
RHINORRHEA: 0
ABDOMINAL PAIN: 0
SINUS PAIN: 0
COUGH: 0
SHORTNESS OF BREATH: 0
CHEST TIGHTNESS: 0
SORE THROAT: 0
DIARRHEA: 0
TROUBLE SWALLOWING: 0
BLOOD IN STOOL: 0

## 2021-11-19 ENCOUNTER — OFFICE VISIT (OUTPATIENT)
Dept: FAMILY MEDICINE CLINIC | Age: 21
End: 2021-11-19
Payer: COMMERCIAL

## 2021-11-19 VITALS
DIASTOLIC BLOOD PRESSURE: 86 MMHG | HEIGHT: 68 IN | OXYGEN SATURATION: 98 % | BODY MASS INDEX: 19.7 KG/M2 | SYSTOLIC BLOOD PRESSURE: 126 MMHG | WEIGHT: 130 LBS | HEART RATE: 117 BPM

## 2021-11-19 DIAGNOSIS — G47.00 INSOMNIA, UNSPECIFIED TYPE: Primary | ICD-10-CM

## 2021-11-19 PROCEDURE — G8427 DOCREV CUR MEDS BY ELIG CLIN: HCPCS | Performed by: INTERNAL MEDICINE

## 2021-11-19 PROCEDURE — G8484 FLU IMMUNIZE NO ADMIN: HCPCS | Performed by: INTERNAL MEDICINE

## 2021-11-19 PROCEDURE — 99214 OFFICE O/P EST MOD 30 MIN: CPT | Performed by: INTERNAL MEDICINE

## 2021-11-19 PROCEDURE — 1036F TOBACCO NON-USER: CPT | Performed by: INTERNAL MEDICINE

## 2021-11-19 PROCEDURE — G8420 CALC BMI NORM PARAMETERS: HCPCS | Performed by: INTERNAL MEDICINE

## 2021-11-19 RX ORDER — ZOLPIDEM TARTRATE 10 MG/1
10 TABLET ORAL NIGHTLY PRN
Qty: 10 TABLET | Refills: 0 | Status: SHIPPED | OUTPATIENT
Start: 2021-11-19 | End: 2021-12-03

## 2021-11-19 ASSESSMENT — ENCOUNTER SYMPTOMS
TROUBLE SWALLOWING: 0
SINUS PAIN: 0
BLOOD IN STOOL: 0
ABDOMINAL PAIN: 0
SHORTNESS OF BREATH: 0
CHEST TIGHTNESS: 0
COUGH: 0
RHINORRHEA: 0
SORE THROAT: 0
DIARRHEA: 0

## 2021-11-19 NOTE — PROGRESS NOTES
[address] [address] [place] [address]  Dept: [phone]  Dept Fax: [phone]  Loc: [phone]     Visit Date:  11/19/2021    Patient:  Franklyn Turner  YOB: 2000    HPI:   Franklyn Turner presents today for   Chief Complaint   Patient presents with    Insomnia     x2 weeks    Other     F/u from hip sx   . HPI 24year old male is coming in for follow up.    2 weeks of insomnia related issues. Attributes to probable some hormonal fluctuation. Few week ago had asthma like symptoms with cold air exposure. Some issues in the past had issues with exposure to smoke. Has been keeping the short acting inhaler. Can take steps around sometimes without crutch. Overall post surgery things are improving doing water therapy. Left lower sided discomfort near the surgical site. Sees specialist next week. Medications  Prior to Visit Medications    Medication Sig Taking? Authorizing Provider   baclofen (LIORESAL) 10 MG tablet Take 1 tablet by mouth 2 times daily Yes Nitin Yepez MD   DULoxetine (CYMBALTA) 30 MG extended release capsule take 1 capsule by mouth twice a day Yes Nitin Yepez MD   lamoTRIgine (LAMICTAL) 100 MG tablet take 1 tablet by mouth once daily Yes Nitin Yepez MD   busPIRone (BUSPAR) 10 MG tablet take 1 tablet by mouth twice a day Yes Nitin Yepez MD   potassium chloride (KLOR-CON M) 20 MEQ extended release tablet Take 1 tablet by mouth 2 times daily Yes Nitin Yepez MD   gabapentin (NEURONTIN) 300 MG capsule Take 300 mg by mouth 3 times daily. Take 2 at hs.  Yes Historical Provider, MD   famotidine (PEPCID) 20 MG tablet Take 20 mg by mouth 2 times daily  Yes Historical Provider, MD   Levonorgestrel [facility]) IUD 19.5 mg 19.5 each by Intrauterine route Yes Historical Provider, MD   Cholecalciferol 50 MCG (2000 UT) TABS Take 2,000 Units by mouth nightly Yes Historical Provider, MD   fludrocortisone (FLORINEF) 0.1 MG tablet Take 1 tablet by mouth daily Take one pill once daily Yes Ivett العراقي MD   loratadine (CLARITIN) 10 MG capsule Take 10 mg by mouth daily Yes Historical Provider, MD        Allergies:  is allergic to reglan [metoclopramide], abilify [aripiprazole], and aloe vera. Past Medical History:   has a past medical history of Anxiety, Autism spectrum disorder, Bipolar disorder ([facility] [address] Depression, Dysmenorrhea, Christen-Danlos syndrome type III, GERD (gastroesophageal reflux disease), Headache, Menorrhagia, Postural orthostatic tachycardia syndrome, Syringomyelia ([address] and Urinary incontinence. Past Surgical History   has a past surgical history that includes Cholecystectomy and [place] tooth extraction (2019). Family History  family history includes Alcohol Abuse in his maternal grandfather and paternal uncle; Dementia in his maternal grandmother; Depression in his father and mother; Heart Attack in his maternal grandfather, maternal uncle, and paternal grandfather; Heart Disease in his maternal grandfather, maternal uncle, mother, paternal grandfather, and sister; High Cholesterol in his maternal grandfather and mother; Learning Disabilities in his brother; Mental Illness in his brother, father, mother, paternal aunt, and paternal uncle; [de-identified] / [place] in his mother; Other in his mother; Substance Abuse in his paternal aunt, paternal cousin, and paternal uncle. Social History   reports that he has never smoked. He has never used smokeless tobacco. He reports that he does not drink alcohol and does not use drugs.     Health Maintenance:    Health Maintenance   Topic Date Due    Hepatitis C screen  Never done    Varicella vaccine (1 of 2 - 2-dose childhood series) Never done    HPV vaccine (1 - 2-dose series) Never done    HIV screen  Never done    Chlamydia screen  Never done    DTaP/Tdap/Td vaccine (1 - Tdap) Never done   [facility] Flu vaccine (1) 09/01/2021    Pap smear  Never done    COVID-19 Vaccine  Completed    Hepatitis A vaccine  Aged Out    Hepatitis B vaccine  Aged Out    Hib vaccine  Aged Out    Meningococcal (ACWY) vaccine  Aged Out    Pneumococcal 0-64 years Vaccine  Aged Out       Subjective:      Review of Systems   Constitutional: Negative for fatigue, fever and unexpected weight change. HENT: Negative for ear pain, postnasal drip, rhinorrhea, sinus pain, sore throat and trouble swallowing. Eyes: Negative for visual disturbance. Respiratory: Negative for cough, chest tightness and shortness of breath. Cardiovascular: Negative for chest pain and leg swelling. Gastrointestinal: Negative for abdominal pain, blood in stool and diarrhea. Endocrine: Negative for polyuria. Genitourinary: Negative for difficulty urinating and flank pain. Musculoskeletal: Positive for arthralgias and myalgias. Negative for joint swelling. Skin: Negative for rash. Allergic/Immunologic: Negative for environmental allergies. Neurological: Negative for weakness, light-headedness, numbness and headaches. Hematological: Negative for adenopathy. Psychiatric/Behavioral: Positive for sleep disturbance. Negative for behavioral problems and suicidal ideas. The patient is not nervous/anxious. Objective:     /86 (Site: Right Upper Arm, Position: Sitting, Cuff Size: Medium Adult)   Pulse 117   Ht 5' 8\" (1.727 m)   Wt 130 lb (59 kg)   SpO2 98%   BMI 19.77 kg/m²     Physical Exam  Vitals and nursing note reviewed. HENT:      Head: Normocephalic and atraumatic. Cardiovascular:      Rate and Rhythm: Normal rate and regular rhythm. Pulses: Normal pulses. Heart sounds: Normal heart sounds. Pulmonary:      Effort: Pulmonary effort is normal. No respiratory distress. Breath sounds: Normal breath sounds. No stridor. No wheezing or rales. Musculoskeletal:         General: Tenderness present.    Skin: General: Skin is warm. Neurological:      Mental Status: He is oriented to person, place, and time. Mental status is at baseline. Psychiatric:         Mood and Affect: Mood normal.             Assessment       Diagnosis Orders   1. Insomnia, unspecified type  zolpidem (AMBIEN) 10 MG tablet         PLAN   Educated on good sleep hygiene habits/  I would encourage to do do Melatonin first for the sleep and if not helping then try the ambien on prn basis given the side effects such as sedation/drowsiness/impaired cognition. Patient is also on Gabapentin/muscle relaxers/buspar and other sedating medications. So encourage judicious use of the sedatives as minimally as possible. No orders of the defined types were placed in this encounter. No follow-ups on file. Patient given educational materials - see patient instructions. Discussed use, benefit, and side effects of prescribed medications. All patient questions answered. Pt voiced understanding. Reviewed health maintenance.        Electronically signed Juventino Anton MD on 11/19/2021 at 9:51 AM EST

## 2021-11-30 ENCOUNTER — OFFICE VISIT (OUTPATIENT)
Dept: FAMILY MEDICINE CLINIC | Age: 21
End: 2021-11-30
Payer: COMMERCIAL

## 2021-11-30 VITALS
TEMPERATURE: 97.5 F | BODY MASS INDEX: 19.68 KG/M2 | DIASTOLIC BLOOD PRESSURE: 78 MMHG | SYSTOLIC BLOOD PRESSURE: 108 MMHG | WEIGHT: 129.4 LBS | HEART RATE: 133 BPM | OXYGEN SATURATION: 99 %

## 2021-11-30 DIAGNOSIS — R53.83 NO ENERGY: Primary | ICD-10-CM

## 2021-11-30 DIAGNOSIS — R45.1 AGITATION: ICD-10-CM

## 2021-11-30 DIAGNOSIS — R00.0 TACHYCARDIA: ICD-10-CM

## 2021-11-30 DIAGNOSIS — R53.83 OTHER FATIGUE: ICD-10-CM

## 2021-11-30 DIAGNOSIS — G47.00 INSOMNIA, UNSPECIFIED TYPE: ICD-10-CM

## 2021-11-30 PROCEDURE — 1036F TOBACCO NON-USER: CPT | Performed by: INTERNAL MEDICINE

## 2021-11-30 PROCEDURE — G8420 CALC BMI NORM PARAMETERS: HCPCS | Performed by: INTERNAL MEDICINE

## 2021-11-30 PROCEDURE — G8427 DOCREV CUR MEDS BY ELIG CLIN: HCPCS | Performed by: INTERNAL MEDICINE

## 2021-11-30 PROCEDURE — G8484 FLU IMMUNIZE NO ADMIN: HCPCS | Performed by: INTERNAL MEDICINE

## 2021-11-30 PROCEDURE — 99214 OFFICE O/P EST MOD 30 MIN: CPT | Performed by: INTERNAL MEDICINE

## 2021-11-30 RX ORDER — GABAPENTIN 300 MG/1
CAPSULE ORAL
Qty: 45 CAPSULE | Refills: 0 | Status: SHIPPED | OUTPATIENT
Start: 2021-11-30 | End: 2021-12-29 | Stop reason: SDUPTHER

## 2021-11-30 ASSESSMENT — ENCOUNTER SYMPTOMS
SORE THROAT: 0
BLOOD IN STOOL: 0
DIARRHEA: 0
COUGH: 0
RHINORRHEA: 0
CHEST TIGHTNESS: 0
SINUS PAIN: 0
ABDOMINAL PAIN: 0
TROUBLE SWALLOWING: 0
SHORTNESS OF BREATH: 0

## 2021-11-30 NOTE — PROGRESS NOTES
[address] [address] [place] [address]  Dept: [phone]  Dept Fax: [phone]  Loc: [phone]     Visit Date:  11/30/2021    Patient:  Kati Lowe  YOB: 2000    HPI:   Kati Lowe presents today for   Chief Complaint   Patient presents with    Other     pt states headaches, sweating, shakey, and sometimes feel dehydrated . Patients believe its from having gabapentin withdrawal. pt states s/s started tuesday which was 24 hours after last dose of gabapentin    . HPI 24year old male is coming in for concerns for probable gabapentin withdrawal.    Last dose of Gabapentin on Monday night before thanksgiving. Has been taking Gabapentin 300 mg QID since April. Feeling terrible this week- fast HR, splitting headaches, trouble sleeping. Hard time concentrating. Excessive sweats. Fatigue, feel like pass out all the time. Very stressed out. Intrusive thoughts about dying but no active suicidal thoughts or actions. Tremors/agitation/hot and sweaty/no energy. No seizure like activity. Medications  Prior to Visit Medications    Medication Sig Taking? Authorizing Provider   zolpidem (AMBIEN) 10 MG tablet Take 1 tablet by mouth nightly as needed for Sleep for up to 14 days.  Yes Catrina Leyva MD   baclofen (LIORESAL) 10 MG tablet Take 1 tablet by mouth 2 times daily Yes Catrina Leyva MD   DULoxetine (CYMBALTA) 30 MG extended release capsule take 1 capsule by mouth twice a day Yes Catrina Leyva MD   lamoTRIgine (LAMICTAL) 100 MG tablet take 1 tablet by mouth once daily Yes Catrina Leyva MD   busPIRone (BUSPAR) 10 MG tablet take 1 tablet by mouth twice a day Yes Catrina Leyva MD   potassium chloride (KLOR-CON M) 20 MEQ extended release tablet Take 1 tablet by mouth 2 times daily Yes Catrina Leyva MD   gabapentin (NEURONTIN) 300 MG capsule Take 300 mg by mouth 3 times daily. Take 2 at hs. Yes Historical Provider, MD   famotidine (PEPCID) 20 MG tablet Take 20 mg by mouth 2 times daily  Yes Historical Provider, MD   Levonorgestrel [facility]) IUD 19.5 mg 19.5 each by Intrauterine route Yes Historical Provider, MD   Cholecalciferol 50 MCG (2000 UT) TABS Take 2,000 Units by mouth nightly Yes Historical Provider, MD   fludrocortisone (FLORINEF) 0.1 MG tablet Take 1 tablet by mouth daily Take one pill once daily Yes Michael Juarez MD   loratadine (CLARITIN) 10 MG capsule Take 10 mg by mouth daily Yes Historical Provider, MD        Allergies:  is allergic to reglan [metoclopramide], abilify [aripiprazole], and aloe vera. Past Medical History:   has a past medical history of Anxiety, Autism spectrum disorder, Bipolar disorder ([facility] [address] Depression, Dysmenorrhea, Christen-Danlos syndrome type III, GERD (gastroesophageal reflux disease), Headache, Menorrhagia, Postural orthostatic tachycardia syndrome, Syringomyelia ([facility] [address] and Urinary incontinence. Past Surgical History   has a past surgical history that includes Cholecystectomy and [place] tooth extraction (2019). Family History  family history includes Alcohol Abuse in his maternal grandfather and paternal uncle; Dementia in his maternal grandmother; Depression in his father and mother; Heart Attack in his maternal grandfather, maternal uncle, and paternal grandfather; Heart Disease in his maternal grandfather, maternal uncle, mother, paternal grandfather, and sister; High Cholesterol in his maternal grandfather and mother; Learning Disabilities in his brother; Mental Illness in his brother, father, mother, paternal aunt, and paternal uncle; [de-identified] / [place] in his mother; Other in his mother; Substance Abuse in his paternal aunt, paternal cousin, and paternal uncle. Social History   reports that he has never smoked.  He has never used smokeless tobacco. He reports that he does not drink alcohol and does not use drugs.    Health Maintenance:    Health Maintenance   Topic Date Due    Hepatitis C screen  Never done    Varicella vaccine (1 of 2 - 2-dose childhood series) Never done    HPV vaccine (1 - 2-dose series) Never done    HIV screen  Never done    Chlamydia screen  Never done    DTaP/Tdap/Td vaccine (1 - Tdap) Never done    Flu vaccine (1) 09/01/2021    Pap smear  Never done    COVID-19 Vaccine  Completed    Hepatitis A vaccine  Aged Out    Hepatitis B vaccine  Aged Out    Hib vaccine  Aged Out    Meningococcal (ACWY) vaccine  Aged Out    Pneumococcal 0-64 years Vaccine  Aged Out       Subjective:      Review of Systems   Constitutional: Positive for fatigue. Negative for fever and unexpected weight change. No energy     HENT: Negative for ear pain, postnasal drip, rhinorrhea, sinus pain, sore throat and trouble swallowing. Eyes: Negative for visual disturbance. Respiratory: Negative for cough, chest tightness and shortness of breath. Cardiovascular: Negative for chest pain and leg swelling. Gastrointestinal: Negative for abdominal pain, blood in stool and diarrhea. Endocrine: Negative for polyuria. Genitourinary: Negative for difficulty urinating and flank pain. Musculoskeletal: Negative for arthralgias, joint swelling and myalgias. Skin: Negative for rash. Allergic/Immunologic: Negative for environmental allergies. Neurological: Positive for dizziness and tremors. Negative for weakness, light-headedness, numbness and headaches. Sweats   Hematological: Negative for adenopathy. Psychiatric/Behavioral: Positive for sleep disturbance. Negative for behavioral problems and suicidal ideas. The patient is nervous/anxious. Objective:     /78 (Site: Right Upper Arm, Position: Sitting, Cuff Size: Medium Adult)   Pulse 133   Temp 97.5 °F (36.4 °C)   Wt 129 lb 6.4 oz (58.7 kg)   SpO2 99%   BMI 19.68 kg/m²     Physical Exam  Vitals and nursing note reviewed. HENT:      Head: Normocephalic and atraumatic. Cardiovascular:      Rate and Rhythm: Regular rhythm. Tachycardia present. Pulmonary:      Breath sounds: No stridor. Musculoskeletal:         General: Normal range of motion. Skin:     General: Skin is warm. Neurological:      Mental Status: He is oriented to person, place, and time. Mental status is at baseline. Psychiatric:         Mood and Affect: Mood normal.             Assessment       Diagnosis Orders   1. No energy  gabapentin (NEURONTIN) 300 MG capsule   2. Other fatigue  gabapentin (NEURONTIN) 300 MG capsule   3. Tachycardia  gabapentin (NEURONTIN) 300 MG capsule   4. Agitation  gabapentin (NEURONTIN) 300 MG capsule   5. Insomnia, unspecified type  gabapentin (NEURONTIN) 300 MG capsule         PLAN   Likely Gabapentin withdrawal related symptoms and medication should never be stopped abruptly. We shall gradually wean him off slowly over a span of two weeks. Take 1 tablet by mouth 2 times daily for 2 weeks, then Take 1 tablet by mouth once daily for another 2 weeks and stop. No orders of the defined types were placed in this encounter. No follow-ups on file. Patient given educational materials - see patient instructions. Discussed use, benefit, and side effects of prescribed medications. All patient questions answered. Pt voiced understanding. Reviewed health maintenance.        Electronically signed Sean Murillo MD on 11/30/2021 at 3:17 PM EST

## 2021-12-09 ENCOUNTER — OFFICE VISIT (OUTPATIENT)
Dept: FAMILY MEDICINE CLINIC | Age: 21
End: 2021-12-09
Payer: COMMERCIAL

## 2021-12-09 VITALS
BODY MASS INDEX: 19.77 KG/M2 | OXYGEN SATURATION: 99 % | SYSTOLIC BLOOD PRESSURE: 134 MMHG | WEIGHT: 130 LBS | DIASTOLIC BLOOD PRESSURE: 82 MMHG | HEART RATE: 109 BPM

## 2021-12-09 DIAGNOSIS — M24.80 GENERALIZED HYPERMOBILITY OF JOINTS: ICD-10-CM

## 2021-12-09 DIAGNOSIS — Q79.60 EHLERS-DANLOS SYNDROME: ICD-10-CM

## 2021-12-09 DIAGNOSIS — Z23 NEED FOR INFLUENZA VACCINATION: Primary | ICD-10-CM

## 2021-12-09 PROCEDURE — 99214 OFFICE O/P EST MOD 30 MIN: CPT | Performed by: INTERNAL MEDICINE

## 2021-12-09 PROCEDURE — G8420 CALC BMI NORM PARAMETERS: HCPCS | Performed by: INTERNAL MEDICINE

## 2021-12-09 PROCEDURE — 90471 IMMUNIZATION ADMIN: CPT | Performed by: INTERNAL MEDICINE

## 2021-12-09 PROCEDURE — G8482 FLU IMMUNIZE ORDER/ADMIN: HCPCS | Performed by: INTERNAL MEDICINE

## 2021-12-09 PROCEDURE — 1036F TOBACCO NON-USER: CPT | Performed by: INTERNAL MEDICINE

## 2021-12-09 PROCEDURE — 90674 CCIIV4 VAC NO PRSV 0.5 ML IM: CPT | Performed by: INTERNAL MEDICINE

## 2021-12-09 PROCEDURE — G8428 CUR MEDS NOT DOCUMENT: HCPCS | Performed by: INTERNAL MEDICINE

## 2021-12-09 ASSESSMENT — ENCOUNTER SYMPTOMS
TROUBLE SWALLOWING: 0
DIARRHEA: 0
COUGH: 0
CHEST TIGHTNESS: 0
SORE THROAT: 0
BLOOD IN STOOL: 0
SHORTNESS OF BREATH: 0
RHINORRHEA: 0
ABDOMINAL PAIN: 0
SINUS PAIN: 0

## 2021-12-09 NOTE — PROGRESS NOTES
Levonorgestrel [facility]) IUD 19.5 mg 19.5 each by Intrauterine route  Historical Provider, MD   Cholecalciferol 50 MCG (2000 UT) TABS Take 2,000 Units by mouth nightly  Historical Provider, MD   fludrocortisone (FLORINEF) 0.1 MG tablet Take 1 tablet by mouth daily Take one pill once daily  Pat Gudino MD   loratadine (CLARITIN) 10 MG capsule Take 10 mg by mouth daily  Historical Provider, MD        Allergies:  is allergic to reglan [metoclopramide], abilify [aripiprazole], and aloe vera. Past Medical History:   has a past medical history of Anxiety, Autism spectrum disorder, Bipolar disorder ([facility] [address] Depression, Dysmenorrhea, Christen-Danlos syndrome type III, GERD (gastroesophageal reflux disease), Headache, Menorrhagia, Postural orthostatic tachycardia syndrome, Syringomyelia ([facility] [address] and Urinary incontinence. Past Surgical History   has a past surgical history that includes Cholecystectomy and [place] tooth extraction (2019). Family History  family history includes Alcohol Abuse in his maternal grandfather and paternal uncle; Dementia in his maternal grandmother; Depression in his father and mother; Heart Attack in his maternal grandfather, maternal uncle, and paternal grandfather; Heart Disease in his maternal grandfather, maternal uncle, mother, paternal grandfather, and sister; High Cholesterol in his maternal grandfather and mother; Learning Disabilities in his brother; Mental Illness in his brother, father, mother, paternal aunt, and paternal uncle; [de-identified] / [place] in his mother; Other in his mother; Substance Abuse in his paternal aunt, paternal cousin, and paternal uncle. Social History   reports that he has never smoked. He has never used smokeless tobacco. He reports that he does not drink alcohol and does not use drugs.     Health Maintenance:    Health Maintenance   Topic Date Due    Hepatitis C screen  Never done    Varicella vaccine (1 of 2 - 2-dose childhood series) Never done    HPV vaccine (1 - 2-dose series) Never done    HIV screen  Never done    Chlamydia screen  Never done    DTaP/Tdap/Td vaccine (1 - Tdap) Never done    Pap smear  Never done    COVID-19 Vaccine (3 - Booster for Pfizer series) 11/01/2021    Flu vaccine  Completed    Hepatitis A vaccine  Aged Out    Hepatitis B vaccine  Aged Out    Hib vaccine  Aged Out    Meningococcal (ACWY) vaccine  Aged Out    Pneumococcal 0-64 years Vaccine  Aged Out       Subjective:      Review of Systems   Constitutional: Negative for fatigue, fever and unexpected weight change. HENT: Negative for ear pain, postnasal drip, rhinorrhea, sinus pain, sore throat and trouble swallowing. Eyes: Negative for visual disturbance. Respiratory: Negative for cough, chest tightness and shortness of breath. Cardiovascular: Negative for chest pain and leg swelling. Gastrointestinal: Negative for abdominal pain, blood in stool and diarrhea. Endocrine: Negative for polyuria. Genitourinary: Negative for difficulty urinating and flank pain. Musculoskeletal: Positive for arthralgias and myalgias. Negative for joint swelling. Skin: Negative for rash. Allergic/Immunologic: Negative for environmental allergies. Neurological: Negative for weakness, light-headedness, numbness and headaches. Hematological: Negative for adenopathy. Psychiatric/Behavioral: Negative for behavioral problems and suicidal ideas. The patient is not nervous/anxious. Objective:     /82 (Site: Right Upper Arm, Position: Sitting)   Pulse 109   Wt 130 lb (59 kg)   SpO2 99%   BMI 19.77 kg/m²     Physical Exam  Vitals and nursing note reviewed. HENT:      Head: Normocephalic and atraumatic. Cardiovascular:      Rate and Rhythm: Tachycardia present. Pulmonary:      Breath sounds: No stridor. Musculoskeletal:      Comments: Using crutches   Skin:     General: Skin is warm.    Neurological:      Mental Status: He is oriented to person, place, and time. Mental status is at baseline. Psychiatric:         Mood and Affect: Mood normal.             Assessment       Diagnosis Orders   1. Need for influenza vaccination  INFLUENZA, MDCK QUADV, 2 YRS AND OLDER, IM, PF, PREFILL SYR OR SDV, 0.5ML (FLUCELVAX QUADV, PF)   2. Generalized hypermobility of joints     3. Christen-Danlos syndrome           PLAN   Flu shot, FMLA forms filled out. Encourage good sleep hygiene habits. Doing well overall. Follow up in 1 month. Orders Placed This Encounter   Procedures    INFLUENZA, MDCK QUADV, 2 YRS AND OLDER, IM, PF, PREFILL SYR OR SDV, 0.5ML (FLUCELVAX QUADV, PF)        No follow-ups on file. Patient given educational materials - see patient instructions. Discussed use, benefit, and side effects of prescribed medications. All patient questions answered. Pt voiced understanding. Reviewed health maintenance.        Electronically signed Justin Russell MD on 12/9/2021 at 1:07 PM EST

## 2021-12-16 DIAGNOSIS — G95.0 SYRINGOMYELIA (HCC): ICD-10-CM

## 2021-12-16 DIAGNOSIS — F41.9 ANXIETY: ICD-10-CM

## 2021-12-16 DIAGNOSIS — F32.A DEPRESSION, UNSPECIFIED DEPRESSION TYPE: ICD-10-CM

## 2021-12-16 DIAGNOSIS — S73.191D TEAR OF RIGHT ACETABULAR LABRUM, SUBSEQUENT ENCOUNTER: ICD-10-CM

## 2021-12-16 DIAGNOSIS — F48.9 MOOD PROBLEM: ICD-10-CM

## 2021-12-16 DIAGNOSIS — G90.A POTS (POSTURAL ORTHOSTATIC TACHYCARDIA SYNDROME): ICD-10-CM

## 2021-12-16 DIAGNOSIS — N94.6 DYSMENORRHEA: ICD-10-CM

## 2021-12-16 DIAGNOSIS — N80.9 ENDOMETRIOSIS: ICD-10-CM

## 2021-12-16 DIAGNOSIS — Q79.60 EHLERS-DANLOS SYNDROME: ICD-10-CM

## 2021-12-16 RX ORDER — LAMOTRIGINE 100 MG/1
TABLET ORAL
Qty: 30 TABLET | Refills: 2 | Status: SHIPPED | OUTPATIENT
Start: 2021-12-16 | End: 2022-03-28

## 2021-12-16 NOTE — TELEPHONE ENCOUNTER
Cedrick Duke is requesting a refill on the following medication(s):  Requested Prescriptions     Pending Prescriptions Disp Refills    lamoTRIgine (LAMICTAL) 100 MG tablet [Pharmacy Med Name: LAMOTRIGINE 100 MG TABLET] 30 tablet 2     Sig: take 1 tablet by mouth once daily       Last Visit Date (If Applicable):  2/1/2463    Next Visit Date:    Visit date not found

## 2021-12-19 DIAGNOSIS — N94.6 DYSMENORRHEA: ICD-10-CM

## 2021-12-19 DIAGNOSIS — S73.191D TEAR OF RIGHT ACETABULAR LABRUM, SUBSEQUENT ENCOUNTER: ICD-10-CM

## 2021-12-19 DIAGNOSIS — F48.9 MOOD PROBLEM: ICD-10-CM

## 2021-12-19 DIAGNOSIS — F32.A DEPRESSION, UNSPECIFIED DEPRESSION TYPE: ICD-10-CM

## 2021-12-19 DIAGNOSIS — G90.A POTS (POSTURAL ORTHOSTATIC TACHYCARDIA SYNDROME): ICD-10-CM

## 2021-12-19 DIAGNOSIS — Q79.60 EHLERS-DANLOS SYNDROME: ICD-10-CM

## 2021-12-19 DIAGNOSIS — G95.0 SYRINGOMYELIA (HCC): ICD-10-CM

## 2021-12-19 DIAGNOSIS — F41.9 ANXIETY: ICD-10-CM

## 2021-12-19 DIAGNOSIS — N80.9 ENDOMETRIOSIS: ICD-10-CM

## 2021-12-20 RX ORDER — BUSPIRONE HYDROCHLORIDE 10 MG/1
TABLET ORAL
Qty: 60 TABLET | Refills: 2 | Status: SHIPPED | OUTPATIENT
Start: 2021-12-20 | End: 2022-03-28

## 2021-12-20 NOTE — TELEPHONE ENCOUNTER
Daniele Galindo is requesting a refill on the following medication(s):  Requested Prescriptions     Pending Prescriptions Disp Refills    busPIRone (BUSPAR) 10 MG tablet [Pharmacy Med Name: BUSPIRONE HCL 10 MG TABLET] 60 tablet 2     Sig: take 1 tablet by mouth twice a day       Last Visit Date (If Applicable):  9/6/6285    Next Visit Date:    Visit date not found

## 2022-01-04 ENCOUNTER — OFFICE VISIT (OUTPATIENT)
Dept: FAMILY MEDICINE CLINIC | Age: 22
End: 2022-01-04
Payer: COMMERCIAL

## 2022-01-04 VITALS
BODY MASS INDEX: 19.7 KG/M2 | WEIGHT: 130 LBS | DIASTOLIC BLOOD PRESSURE: 82 MMHG | HEART RATE: 124 BPM | RESPIRATION RATE: 20 BRPM | HEIGHT: 68 IN | OXYGEN SATURATION: 98 % | SYSTOLIC BLOOD PRESSURE: 110 MMHG

## 2022-01-04 DIAGNOSIS — Z92.29 HISTORY OF MMR VACCINATION: ICD-10-CM

## 2022-01-04 DIAGNOSIS — M79.652 PAIN OF LEFT LATERAL UPPER THIGH: ICD-10-CM

## 2022-01-04 DIAGNOSIS — Z23 IMMUNIZATION, VARICELLA: Primary | ICD-10-CM

## 2022-01-04 PROCEDURE — G8427 DOCREV CUR MEDS BY ELIG CLIN: HCPCS | Performed by: INTERNAL MEDICINE

## 2022-01-04 PROCEDURE — G8420 CALC BMI NORM PARAMETERS: HCPCS | Performed by: INTERNAL MEDICINE

## 2022-01-04 PROCEDURE — G8482 FLU IMMUNIZE ORDER/ADMIN: HCPCS | Performed by: INTERNAL MEDICINE

## 2022-01-04 PROCEDURE — 1036F TOBACCO NON-USER: CPT | Performed by: INTERNAL MEDICINE

## 2022-01-04 PROCEDURE — 99214 OFFICE O/P EST MOD 30 MIN: CPT | Performed by: INTERNAL MEDICINE

## 2022-01-04 ASSESSMENT — ENCOUNTER SYMPTOMS
SORE THROAT: 0
BLOOD IN STOOL: 0
SHORTNESS OF BREATH: 0
CHEST TIGHTNESS: 0
COUGH: 0
ABDOMINAL PAIN: 0
RHINORRHEA: 0
SINUS PAIN: 0
TROUBLE SWALLOWING: 0
DIARRHEA: 0

## 2022-01-04 NOTE — PROGRESS NOTES
[address]  [address] [place] [address] [facility] [address]  Dept: [phone]  Dept Fax: [phone]  Loc: [phone]     Visit Date:  1/4/2022    Patient:  Chava Kirk  YOB: 2000    HPI:   Chava Kirk presents today for   Chief Complaint   Patient presents with    Follow-up     1 month f/u    . HPI 24year old male is coming in for 1 month follow up. Gabapentin seems to be helping with left lateral thigh pain along the lateral femoral cutaneous nerve distribution. Pain seems to be worse post exercise/physical therapy/aquatic therapy. no muscle atrophy. Decreased sensation on the right lateral thigh area post surgery. Need titers check for varicella/Rubella/Rubeola. Medications  Prior to Visit Medications    Medication Sig Taking? Authorizing Provider   gabapentin (NEURONTIN) 300 MG capsule Take 1 capsule by mouth 2 times daily for 30 days.  Yes Latisha Novoa MD   busPIRone (BUSPAR) 10 MG tablet take 1 tablet by mouth twice a day Yes Latisha Novoa MD   lamoTRIgine (LAMICTAL) 100 MG tablet take 1 tablet by mouth once daily Yes Latisha Novoa MD   baclofen (LIORESAL) 10 MG tablet Take 1 tablet by mouth 2 times daily Yes Latisha Novoa MD   DULoxetine (CYMBALTA) 30 MG extended release capsule take 1 capsule by mouth twice a day Yes Latisha Novoa MD   famotidine (PEPCID) 20 MG tablet Take 20 mg by mouth 2 times daily Taking as needed Yes Historical Provider, MD   Levonorgestrel [place] [place]) IUD 19.5 mg 19.5 each by Intrauterine route Yes Historical Provider, MD   Cholecalciferol 50 MCG (2000 UT) TABS Take 2,000 Units by mouth nightly Yes Historical Provider, MD   fludrocortisone (FLORINEF) 0.1 MG tablet Take 1 tablet by mouth daily Take one pill once daily Yes Latisha Novoa MD   loratadine (CLARITIN) 10 MG capsule Take 10 mg by mouth daily Yes Historical Provider, MD Allergies:  is allergic to reglan [metoclopramide], abilify [aripiprazole], and aloe vera. Past Medical History:   has a past medical history of Anxiety, Autism spectrum disorder, Bipolar disorder ([address] Depression, Dysmenorrhea, Christen-Danlos syndrome type III, GERD (gastroesophageal reflux disease), Headache, Menorrhagia, Postural orthostatic tachycardia syndrome, Syringomyelia ([address] and Urinary incontinence. Past Surgical History   has a past surgical history that includes Cholecystectomy and [place] tooth extraction (2019). Family History  family history includes Alcohol Abuse in his maternal grandfather and paternal uncle; Dementia in his maternal grandmother; Depression in his father and mother; Heart Attack in his maternal grandfather, maternal uncle, and paternal grandfather; Heart Disease in his maternal grandfather, maternal uncle, mother, paternal grandfather, and sister; High Cholesterol in his maternal grandfather and mother; Learning Disabilities in his brother; Mental Illness in his brother, father, mother, paternal aunt, and paternal uncle; [place] / [place] in his mother; Other in his mother; Substance Abuse in his paternal aunt, paternal cousin, and paternal uncle. Social History   reports that he has never smoked. He has never used smokeless tobacco. He reports that he does not drink alcohol and does not use drugs.     Health Maintenance:    Health Maintenance   Topic Date Due    Hepatitis C screen  Never done    Varicella vaccine (1 of 2 - 2-dose childhood series) Never done    HPV vaccine (1 - 2-dose series) Never done    HIV screen  Never done    Chlamydia screen  Never done    DTaP/Tdap/Td vaccine (1 - Tdap) Never done    Pap smear  Never done    Depression Monitoring  02/19/2022    Flu vaccine  Completed    COVID-19 Vaccine  Completed    Hepatitis A vaccine  Aged Out    Hepatitis B vaccine  Aged Out    Hib vaccine  Aged Out    Meningococcal (ACWY) vaccine  Aged Out    Pneumococcal 0-64 years Vaccine  Aged Out       Subjective:      Review of Systems   Constitutional: Negative for fatigue, fever and unexpected weight change. HENT: Negative for ear pain, postnasal drip, rhinorrhea, sinus pain, sore throat and trouble swallowing. Eyes: Negative for visual disturbance. Respiratory: Negative for cough, chest tightness and shortness of breath. Cardiovascular: Negative for chest pain and leg swelling. Gastrointestinal: Negative for abdominal pain, blood in stool and diarrhea. Endocrine: Negative for polyuria. Genitourinary: Negative for difficulty urinating and flank pain. Musculoskeletal: Positive for arthralgias. Negative for joint swelling and myalgias. Skin: Negative for rash. Allergic/Immunologic: Negative for environmental allergies. Neurological: Negative for weakness, light-headedness, numbness and headaches. Hematological: Negative for adenopathy. Psychiatric/Behavioral: Negative for behavioral problems and suicidal ideas. The patient is not nervous/anxious. Objective:     /82 (Site: Left Upper Arm, Position: Sitting, Cuff Size: Medium Adult)   Pulse 124   Resp 20   Ht 5' 8\" (1.727 m)   Wt 130 lb (59 kg)   SpO2 98%   BMI 19.77 kg/m²     Physical Exam  Vitals and nursing note reviewed. HENT:      Head: Normocephalic and atraumatic. Cardiovascular:      Rate and Rhythm: Normal rate and regular rhythm. Pulmonary:      Breath sounds: No stridor. Musculoskeletal:         General: Tenderness present. Comments: Slight pelvic tilt to right noticed during walking  Decreased sensations along right lateral thigh area     Skin:     General: Skin is warm. Neurological:      Mental Status: He is oriented to person, place, and time. Mental status is at baseline. Psychiatric:         Mood and Affect: Mood normal.             Assessment       Diagnosis Orders   1.  Immunization, varicella  Varicella Zoster Antibody, IgG    Rubella Antibody, IgG    Rubeola Antibody IgG   2. History of MMR vaccination  Varicella Zoster Antibody, IgG    Rubella Antibody, IgG    Rubeola Antibody IgG   3. Pain of left lateral upper thigh           PLAN   Check titers for varicella/rubella/rubeola. Continue with gabapentin /aquatic therapy/exercises as tolerated to build up the endurance and strength. Follow up in March. Orders Placed This Encounter   Procedures    Varicella Zoster Antibody, IgG     Need titers     Standing Status:   Future     Standing Expiration Date:   1/4/2023    Rubella Antibody, IgG     Need titers     Standing Status:   Future     Standing Expiration Date:   1/4/2023    Rubeola Antibody IgG     Need titers     Standing Status:   Future     Standing Expiration Date:   1/4/2023        No follow-ups on file. Patient given educational materials - see patient instructions. Discussed use, benefit, and side effects of prescribed medications. All patient questions answered. Pt voiced understanding. Reviewed health maintenance.        Electronically signed Eun Mccarthy MD on 1/4/2022 at 12:03 PM EST

## 2022-01-18 RX ORDER — FLUDROCORTISONE ACETATE 0.1 MG/1
TABLET ORAL
Qty: 90 TABLET | Refills: 3 | Status: SHIPPED | OUTPATIENT
Start: 2022-01-18 | End: 2022-02-11 | Stop reason: SDUPTHER

## 2022-01-18 NOTE — TELEPHONE ENCOUNTER
Naomie Webb is requesting a refill on the following medication(s):  Requested Prescriptions     Pending Prescriptions Disp Refills    fludrocortisone (FLORINEF) 0.1 MG tablet [Pharmacy Med Name: FLUDROCORTISONE 0.1 MG TABLET] 90 tablet 3     Sig: take 1 tablet by mouth once daily       Last Visit Date (If Applicable):  3/5/9925    Next Visit Date:    3/1/2022

## 2022-01-24 ENCOUNTER — VIRTUAL VISIT (OUTPATIENT)
Dept: FAMILY MEDICINE CLINIC | Age: 22
End: 2022-01-24
Payer: COMMERCIAL

## 2022-01-24 DIAGNOSIS — M25.552 HIP PAIN, CHRONIC, LEFT: ICD-10-CM

## 2022-01-24 DIAGNOSIS — Q79.60 EHLERS-DANLOS SYNDROME: ICD-10-CM

## 2022-01-24 DIAGNOSIS — M24.80 GENERALIZED HYPERMOBILITY OF JOINTS: Primary | ICD-10-CM

## 2022-01-24 DIAGNOSIS — Z98.890 STATUS POST ARTHROSCOPY OF HIP: ICD-10-CM

## 2022-01-24 DIAGNOSIS — G89.29 HIP PAIN, CHRONIC, LEFT: ICD-10-CM

## 2022-01-24 PROCEDURE — 99443 PR PHYS/QHP TELEPHONE EVALUATION 21-30 MIN: CPT | Performed by: INTERNAL MEDICINE

## 2022-01-24 NOTE — PROGRESS NOTES
Marni Pinedo (:  2000) is a Established patient, here for evaluation of the following:    Assessment & Plan   Below is the assessment and plan developed based on review of pertinent history, physical exam, labs, studies, and medications. 1. Generalized hypermobility of joints  -     Basic Metabolic Panel; Future  -     MRI HIP LEFT W CONTRAST; Future  -     External Referral To Pain Clinic  2. Christen-Danlos syndrome  -     Basic Metabolic Panel; Future  -     MRI HIP LEFT W CONTRAST; Future  -     External Referral To Pain Clinic  3. Status post arthroscopy of hip  -     Basic Metabolic Panel; Future  -     MRI HIP LEFT W CONTRAST; Future  -     External Referral To Pain Clinic  4. Hip pain, chronic, left  -     Basic Metabolic Panel; Future  -     MRI HIP LEFT W CONTRAST; Future  -     External Referral To Pain Clinic    No follow-ups on file. Subjective   HPI  Review of Systems        24year old male is calling regarding worsening  Anterior hip for almost  1 week - \"in the joint \". The pain is becoming so constant that he is taking 800 mg of Ibuprofen almost around the clock and icing it for very temporary and mild relief. Status post op left hip labral repair, femoral neck osteoplasty, open periacetabular osteotomy surgery in sept of last year. The pain feels like its coming from the joints and seems \" very different\" from her post op recovery pain and or post rehab pain. Unable to do housework, weight bearing makes it worse. Its very debilitating after being intimate. It's hard to sleep and  completely unable to do even the slightest active hip flexion due to pain. Last MRI of the left hip joint    1. Intrasubstance tearing of the anterior and anterior superior left   acetabular labrum.  Degeneration of the remainder of the left acetabular   labrum.    2. Nondisplaced fracture at the junction of the posterior column right   acetabulum and right ischial tuberosity with associated marrow edema.  This   may be acute or subacute. 3. Mild left hip chondromalacia. 4. No acute osseous abnormality.  No femoral head AVN. 5. Postsurgical changes and susceptibility artifact in the proximal right   femur and right acetabulum. 6. Right ovarian cyst measures 2.5 cm.  IUD in proper position in the   endometrial canal.  Bilateral ovarian follicles.  Trace free fluid in the   pelvis. 7. Mild-to-moderate right hip osteoarthrosis.  Small right hip joint effusion. Objective   Patient-Reported Vitals  No data recorded       On this date 1/24/2022 I have spent 30 minutes reviewing previous notes, test results and face to face (virtual) with the patient discussing the diagnosis and importance of compliance with the treatment plan as well as documenting on the day of the visit. A:P  MRI of the left hip with contrast to further assess and evaluate the left hip joint. For now continue to do the best for pain control alternating b/w tylenol, NSAIDs with meals prn as well tramadol prn. Try to limit or avoid any activity that would make the pain worse including physical therapy. Liza Castillo, was evaluated through a synchronous (real-time) audio-video encounter. The patient (or guardian if applicable) is aware that this is a billable service, which includes applicable co-pays. This Virtual Visit was conducted with patient's (and/or legal guardian's) consent. The visit was conducted pursuant to the emergency declaration under the [address] [place] [address], [address] [place] [address] authority and the [employer] General Act. Patient identification was verified, and a caregiver was present when appropriate. The patient was located at home in a state where the provider was licensed to provide care.        --Kaitlyn Mcbride MD

## 2022-01-25 ENCOUNTER — TELEPHONE (OUTPATIENT)
Dept: FAMILY MEDICINE CLINIC | Age: 22
End: 2022-01-25

## 2022-02-11 ENCOUNTER — PATIENT MESSAGE (OUTPATIENT)
Dept: FAMILY MEDICINE CLINIC | Age: 22
End: 2022-02-11

## 2022-02-11 ENCOUNTER — TELEPHONE (OUTPATIENT)
Dept: FAMILY MEDICINE CLINIC | Age: 22
End: 2022-02-11

## 2022-02-11 DIAGNOSIS — G95.0 SYRINGOMYELIA (HCC): ICD-10-CM

## 2022-02-11 DIAGNOSIS — Q79.60 EHLERS-DANLOS SYNDROME: ICD-10-CM

## 2022-02-11 DIAGNOSIS — M79.652 PAIN OF LEFT LATERAL UPPER THIGH: ICD-10-CM

## 2022-02-11 DIAGNOSIS — M25.552 HIP PAIN, CHRONIC, LEFT: ICD-10-CM

## 2022-02-11 DIAGNOSIS — G90.A POTS (POSTURAL ORTHOSTATIC TACHYCARDIA SYNDROME): ICD-10-CM

## 2022-02-11 DIAGNOSIS — Q79.60 EHLERS-DANLOS SYNDROME: Primary | ICD-10-CM

## 2022-02-11 DIAGNOSIS — M24.80 GENERALIZED HYPERMOBILITY OF JOINTS: Primary | ICD-10-CM

## 2022-02-11 DIAGNOSIS — M24.80 GENERALIZED HYPERMOBILITY OF JOINTS: ICD-10-CM

## 2022-02-11 DIAGNOSIS — Q79.62 HYPERMOBILE EHLERS-DANLOS SYNDROME: ICD-10-CM

## 2022-02-11 DIAGNOSIS — Z98.890 STATUS POST ARTHROSCOPY OF HIP: ICD-10-CM

## 2022-02-11 DIAGNOSIS — G89.29 HIP PAIN, CHRONIC, LEFT: ICD-10-CM

## 2022-02-11 RX ORDER — TRAMADOL HYDROCHLORIDE 50 MG/1
50 TABLET ORAL EVERY 6 HOURS PRN
Qty: 28 TABLET | Refills: 0 | Status: SHIPPED | OUTPATIENT
Start: 2022-02-11 | End: 2022-02-18

## 2022-02-11 RX ORDER — FLUDROCORTISONE ACETATE 0.1 MG/1
TABLET ORAL
Qty: 90 TABLET | Refills: 3 | Status: SHIPPED | OUTPATIENT
Start: 2022-02-11

## 2022-02-11 NOTE — TELEPHONE ENCOUNTER
From: Fady Thornton  To: Dr. Patience Rahman: 2022 11:05 AM EST  Subject: Prescription Refill    I am out of refills for my Fludrocortisone 0.1 mg once per day. I usually get 90 day supplies per bottle. I also have started PT again recently and on the night following a session, I am in a lot of pain and sleep very very poorly. I have used 4 of the 5 leftover tramadol I had left since last talking with you, once I started resting my body to a significant degree, I was able to sleep with just ibuprofen or acetaminophen. The took about 4 days. However since starting PT a few days ago, I've been in a lot of pain trying to sleep. I have the appointment with pain management on the  this month, so if you would be willing to prescribe 10 tramadol, that will get me until my appointment.      Thank you,  -Lurdes Alexander.

## 2022-02-15 ENCOUNTER — TELEPHONE (OUTPATIENT)
Dept: FAMILY MEDICINE CLINIC | Age: 22
End: 2022-02-15

## 2022-02-15 DIAGNOSIS — Z98.890 STATUS POST OSTEOTOMY: ICD-10-CM

## 2022-02-15 DIAGNOSIS — M79.652 PAIN OF LEFT LATERAL UPPER THIGH: ICD-10-CM

## 2022-02-15 DIAGNOSIS — M24.80 GENERALIZED HYPERMOBILITY OF JOINTS: ICD-10-CM

## 2022-02-15 DIAGNOSIS — Q79.60 EHLERS-DANLOS SYNDROME: Primary | ICD-10-CM

## 2022-02-15 DIAGNOSIS — G89.29 HIP PAIN, CHRONIC, LEFT: ICD-10-CM

## 2022-02-15 DIAGNOSIS — M25.552 HIP PAIN, CHRONIC, LEFT: ICD-10-CM

## 2022-02-15 DIAGNOSIS — Z98.890 STATUS POST ARTHROSCOPY OF HIP: ICD-10-CM

## 2022-02-15 DIAGNOSIS — S73.192S TEAR OF LEFT ACETABULAR LABRUM, SEQUELA: ICD-10-CM

## 2022-02-15 NOTE — TELEPHONE ENCOUNTER
-Firstly I would contact the patient first and ensure if she is okies with the arthrogram.  I think with and without contrast is okies not looking for infection but give complexity of her joint disease needs contrast study. Do they need another order please let me know. 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 She has been having pain since her surgery and slow recovery. She sees Dr Carina Chaparro in [place].        IN ARTHROSCOPY HIP W/FEMOROPLASTY    IN PELVIS/HIP JOINT SURGERY UNLISTED    IN PERIACETABULAR OSTEOTOMY    LEFT HIP ARTHROSCOPY LABRAL REPAIR VS DEBRIDEMENT VS RECONSTRUCTION, FEMORAL NECK OSTEOPLASTY, OPEN PERIACETABULAR OSTEOTOMY, FEMORAL DEROTATIONAL OSTEOTOMY

## 2022-02-15 NOTE — TELEPHONE ENCOUNTER
The order with the INJ ARTHROSCOPY was the correct order. I faxed it to the hospital pt is having procedure done at and they will contact pt. Also need to call [facility] about the referral for Maternal-fetal-med.  I will accomplish that today and get us the correct referral.

## 2022-02-15 NOTE — TELEPHONE ENCOUNTER
Pt says she wants to leave the decision to you about the arthrogram. She has had them in the past and does not like them but is willing to do it if you want.  I will need a new order either way

## 2022-02-15 NOTE — TELEPHONE ENCOUNTER
[facility] called about the mri stating they do not just do MRI w con it needs to be an arthrogram. If you are looking for infection it needs to be ordered w and wo. She is scheduled tomorrow. What would you like to do. They dont know what you are looking for. Can we get some more information.   [phone] Kajal richey

## 2022-02-16 ENCOUNTER — TELEPHONE (OUTPATIENT)
Dept: FAMILY MEDICINE CLINIC | Age: 22
End: 2022-02-16

## 2022-02-16 DIAGNOSIS — Z92.29 HISTORY OF MMR VACCINATION: ICD-10-CM

## 2022-02-16 DIAGNOSIS — G95.0 SYRINGOMYELIA (HCC): ICD-10-CM

## 2022-02-16 DIAGNOSIS — Z23 IMMUNIZATION, VARICELLA: ICD-10-CM

## 2022-02-16 DIAGNOSIS — Q79.60 EHLERS-DANLOS SYNDROME: Primary | ICD-10-CM

## 2022-02-16 DIAGNOSIS — G90.A POTS (POSTURAL ORTHOSTATIC TACHYCARDIA SYNDROME): ICD-10-CM

## 2022-02-16 DIAGNOSIS — M24.80 GENERALIZED HYPERMOBILITY OF JOINTS: ICD-10-CM

## 2022-02-16 DIAGNOSIS — Z97.5 IUD (INTRAUTERINE DEVICE) IN PLACE: ICD-10-CM

## 2022-02-21 ENCOUNTER — TELEMEDICINE (OUTPATIENT)
Dept: FAMILY MEDICINE CLINIC | Age: 22
End: 2022-02-21

## 2022-02-21 DIAGNOSIS — G90.A POTS (POSTURAL ORTHOSTATIC TACHYCARDIA SYNDROME): Primary | ICD-10-CM

## 2022-02-21 DIAGNOSIS — Q79.60 EHLERS-DANLOS SYNDROME: ICD-10-CM

## 2022-02-21 DIAGNOSIS — G89.29 CHRONIC LEFT HIP PAIN: ICD-10-CM

## 2022-02-21 DIAGNOSIS — M25.552 CHRONIC LEFT HIP PAIN: ICD-10-CM

## 2022-02-21 DIAGNOSIS — M24.80 GENERALIZED HYPERMOBILITY OF JOINTS: ICD-10-CM

## 2022-02-21 PROCEDURE — 99422 OL DIG E/M SVC 11-20 MIN: CPT | Performed by: INTERNAL MEDICINE

## 2022-02-21 ASSESSMENT — ENCOUNTER SYMPTOMS
SINUS PAIN: 0
DIARRHEA: 0
SHORTNESS OF BREATH: 0
TROUBLE SWALLOWING: 0
ABDOMINAL PAIN: 0
COUGH: 0
RHINORRHEA: 0
SORE THROAT: 0
BLOOD IN STOOL: 0
CHEST TIGHTNESS: 0

## 2022-02-21 ASSESSMENT — PATIENT HEALTH QUESTIONNAIRE - PHQ9
3. TROUBLE FALLING OR STAYING ASLEEP: 0
9. THOUGHTS THAT YOU WOULD BE BETTER OFF DEAD, OR OF HURTING YOURSELF: 0
2. FEELING DOWN, DEPRESSED OR HOPELESS: 0
SUM OF ALL RESPONSES TO PHQ QUESTIONS 1-9: 1
5. POOR APPETITE OR OVEREATING: 0
6. FEELING BAD ABOUT YOURSELF - OR THAT YOU ARE A FAILURE OR HAVE LET YOURSELF OR YOUR FAMILY DOWN: 0
SUM OF ALL RESPONSES TO PHQ QUESTIONS 1-9: 1
8. MOVING OR SPEAKING SO SLOWLY THAT OTHER PEOPLE COULD HAVE NOTICED. OR THE OPPOSITE, BEING SO FIGETY OR RESTLESS THAT YOU HAVE BEEN MOVING AROUND A LOT MORE THAN USUAL: 0
7. TROUBLE CONCENTRATING ON THINGS, SUCH AS READING THE NEWSPAPER OR WATCHING TELEVISION: 0
10. IF YOU CHECKED OFF ANY PROBLEMS, HOW DIFFICULT HAVE THESE PROBLEMS MADE IT FOR YOU TO DO YOUR WORK, TAKE CARE OF THINGS AT HOME, OR GET ALONG WITH OTHER PEOPLE: 0
4. FEELING TIRED OR HAVING LITTLE ENERGY: 1

## 2022-02-21 NOTE — PROGRESS NOTES
Latonya Apodaca (:  2000) is a Established patient, here for evaluation of the following:    Assessment & Plan   Below is the assessment and plan developed based on review of pertinent history, physical exam, labs, studies, and medications. 1. POTS (postural orthostatic tachycardia syndrome)  2. Generalized hypermobility of joints  3. Christen-Danlos syndrome  4. Chronic left hip pain    No follow-ups on file. Subjective   HPI 24year old male is calling in regards to follow up on left hip pain. Doing okies struggle with the pain especially on the day of physical therapy. Have taken tramadol only on the days of physical therapy. Scheduled for MRI of the left hip/athrogram next week. Pain along the lateral femoral cutaneous nerve distribution. Is seeing pain management tomorrow. 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 Review of Systems   Constitutional: Negative for fatigue, fever and unexpected weight change. HENT: Negative for ear pain, postnasal drip, rhinorrhea, sinus pain, sore throat and trouble swallowing. Eyes: Negative for visual disturbance. Respiratory: Negative for cough, chest tightness and shortness of breath. Cardiovascular: Negative for chest pain and leg swelling. Gastrointestinal: Negative for abdominal pain, blood in stool and diarrhea. Endocrine: Negative for polyuria. Genitourinary: Negative for difficulty urinating and flank pain. Musculoskeletal: Positive for arthralgias and myalgias. Negative for joint swelling. Skin: Negative for rash. Allergic/Immunologic: Negative for environmental allergies. Neurological: Negative for weakness, light-headedness, numbness and headaches. Hematological: Negative for adenopathy. Psychiatric/Behavioral: Negative for behavioral problems and suicidal ideas.  The patient is not nervous/anxious. Objective   Patient-Reported Vitals  No data recorded            On this date 2/21/2022 I have spent 30 minutes reviewing previous notes, test results and face to face (virtual) with the patient discussing the diagnosis and importance of compliance with the treatment plan as well as documenting on the day of the visit. A:P  Continue to do physical therapy as tolerated/awaiting MRI results and will get back to us with refills request on medications. [facility], was evaluated through a synchronous (real-time) audio-video encounter. The patient (or guardian if applicable) is aware that this is a billable service, which includes applicable co-pays. This Virtual Visit was conducted with patient's (and/or legal guardian's) consent. The visit was conducted pursuant to the emergency declaration under the [address] [place] [address], [address] [place] [address] [facility] authority and the [employer] and [employer] General Act. Patient identification was verified, and a caregiver was present when appropriate. The patient was located at home in a state where the provider was licensed to provide care.        --Cher Deshpande MD

## 2022-03-03 ENCOUNTER — HOSPITAL ENCOUNTER (OUTPATIENT)
Dept: GENERAL RADIOLOGY | Age: 22
Discharge: HOME | End: 2022-03-05
Payer: COMMERCIAL

## 2022-03-03 ENCOUNTER — HOSPITAL ENCOUNTER (OUTPATIENT)
Dept: MRI IMAGING | Age: 22
Discharge: HOME | End: 2022-03-05
Payer: COMMERCIAL

## 2022-03-03 DIAGNOSIS — M24.80 GENERALIZED HYPERMOBILITY OF JOINTS: ICD-10-CM

## 2022-03-03 DIAGNOSIS — G89.29 HIP PAIN, CHRONIC, LEFT: ICD-10-CM

## 2022-03-03 DIAGNOSIS — Z98.890 STATUS POST ARTHROSCOPY OF HIP: ICD-10-CM

## 2022-03-03 DIAGNOSIS — S73.192S TEAR OF LEFT ACETABULAR LABRUM, SEQUELA: ICD-10-CM

## 2022-03-03 DIAGNOSIS — Q79.60 EHLERS-DANLOS SYNDROME: ICD-10-CM

## 2022-03-03 DIAGNOSIS — M79.652 PAIN OF LEFT LATERAL UPPER THIGH: ICD-10-CM

## 2022-03-03 DIAGNOSIS — Z98.890 STATUS POST OSTEOTOMY: ICD-10-CM

## 2022-03-03 DIAGNOSIS — M25.552 HIP PAIN, CHRONIC, LEFT: ICD-10-CM

## 2022-03-03 PROCEDURE — 27093 INJECTION FOR HIP X-RAY: CPT

## 2022-03-03 PROCEDURE — 73525 CONTRAST X-RAY OF HIP: CPT

## 2022-03-03 PROCEDURE — A9579 GAD-BASE MR CONTRAST NOS,1ML: HCPCS | Performed by: INTERNAL MEDICINE

## 2022-03-03 PROCEDURE — 6360000004 HC RX CONTRAST MEDICATION: Performed by: INTERNAL MEDICINE

## 2022-03-03 PROCEDURE — 73722 MRI JOINT OF LWR EXTR W/DYE: CPT

## 2022-03-03 RX ADMIN — GADOTERIDOL 0.2 ML: 279.3 INJECTION, SOLUTION INTRAVENOUS at 11:29

## 2022-03-03 RX ADMIN — IOHEXOL 3 ML: 240 INJECTION, SOLUTION INTRATHECAL; INTRAVASCULAR; INTRAVENOUS; ORAL at 11:30

## 2022-03-28 ENCOUNTER — TELEPHONE (OUTPATIENT)
Dept: INTERNAL MEDICINE | Age: 22
End: 2022-03-28

## 2022-03-28 DIAGNOSIS — N80.9 ENDOMETRIOSIS: ICD-10-CM

## 2022-03-28 DIAGNOSIS — F41.9 ANXIETY: ICD-10-CM

## 2022-03-28 DIAGNOSIS — S73.191D TEAR OF RIGHT ACETABULAR LABRUM, SUBSEQUENT ENCOUNTER: ICD-10-CM

## 2022-03-28 DIAGNOSIS — G90.A POTS (POSTURAL ORTHOSTATIC TACHYCARDIA SYNDROME): ICD-10-CM

## 2022-03-28 DIAGNOSIS — F48.9 MOOD PROBLEM: ICD-10-CM

## 2022-03-28 DIAGNOSIS — G95.0 SYRINGOMYELIA (HCC): ICD-10-CM

## 2022-03-28 DIAGNOSIS — N94.6 DYSMENORRHEA: ICD-10-CM

## 2022-03-28 DIAGNOSIS — F32.A DEPRESSION, UNSPECIFIED DEPRESSION TYPE: ICD-10-CM

## 2022-03-28 DIAGNOSIS — Q79.60 EHLERS-DANLOS SYNDROME: ICD-10-CM

## 2022-03-28 RX ORDER — DULOXETIN HYDROCHLORIDE 30 MG/1
CAPSULE, DELAYED RELEASE ORAL
Qty: 180 CAPSULE | Refills: 5 | Status: SHIPPED | OUTPATIENT
Start: 2022-03-28 | End: 2022-09-28 | Stop reason: SDUPTHER

## 2022-03-28 RX ORDER — LAMOTRIGINE 100 MG/1
TABLET ORAL
Qty: 30 TABLET | Refills: 5 | Status: SHIPPED | OUTPATIENT
Start: 2022-03-28 | End: 2022-09-28 | Stop reason: SDUPTHER

## 2022-03-28 RX ORDER — BUSPIRONE HYDROCHLORIDE 10 MG/1
TABLET ORAL
Qty: 60 TABLET | Refills: 5 | Status: SHIPPED | OUTPATIENT
Start: 2022-03-28 | End: 2022-09-28 | Stop reason: SDUPTHER

## 2022-03-28 NOTE — TELEPHONE ENCOUNTER
Pt is requesting to see if she can become a new pt of dr ruthie Montero please call and advise pt will be aval tomorrow via phone

## 2022-03-28 NOTE — TELEPHONE ENCOUNTER
Joseluis Arana is requesting a refill on the following medication(s):  Requested Prescriptions     Pending Prescriptions Disp Refills    DULoxetine (CYMBALTA) 30 MG extended release capsule 60 capsule 0     Sig: take 1 capsule by mouth twice a day       Last Visit Date (If Applicable):  0/8/1204    Next Visit Date:    Visit date not found

## 2022-03-28 NOTE — TELEPHONE ENCOUNTER
Esther Retana is requesting a refill on the following medication(s):  Requested Prescriptions     Pending Prescriptions Disp Refills    busPIRone (BUSPAR) 10 MG tablet [Pharmacy Med Name: BUSPIRONE HCL 10 MG TABLET] 60 tablet 2     Sig: take 1 tablet by mouth twice a day    lamoTRIgine (LAMICTAL) 100 MG tablet [Pharmacy Med Name: LAMOTRIGINE 100 MG TABLET] 30 tablet 2     Sig: take 1 tablet by mouth once daily       Last Visit Date (If Applicable):  9/3/4738    Next Visit Date:    Visit date not found

## 2022-03-29 NOTE — TELEPHONE ENCOUNTER
Called and spoke with pt via phone. Pt unable to come in 4/4. Appt scheduled for 4/25/22 at 1030. Requested pt to bring in all medication bottles with them to appt.

## 2022-04-11 ENCOUNTER — HOSPITAL ENCOUNTER (OUTPATIENT)
Age: 22
Setting detail: SPECIMEN
Discharge: HOME | End: 2022-04-11

## 2022-04-11 ENCOUNTER — INITIAL CONSULT (OUTPATIENT)
Dept: PERINATAL CARE | Age: 22
End: 2022-04-11
Payer: COMMERCIAL

## 2022-04-11 VITALS
HEIGHT: 68 IN | HEART RATE: 116 BPM | RESPIRATION RATE: 20 BRPM | BODY MASS INDEX: 19.61 KG/M2 | WEIGHT: 129.4 LBS | SYSTOLIC BLOOD PRESSURE: 121 MMHG | TEMPERATURE: 96.9 F | DIASTOLIC BLOOD PRESSURE: 81 MMHG

## 2022-04-11 DIAGNOSIS — O35.8XX0 SUSPECTED DAMAGE TO FETUS FROM DISEASE IN MOTHER, ANTEPARTUM CONDITION, SINGLE OR UNSPECIFIED FETUS: ICD-10-CM

## 2022-04-11 DIAGNOSIS — O35.2XX0 HEREDITARY FAMILIAL DISEASE AFFECTING MANAGEMENT OF MOTHER AND POSSIBLY AFFECTING FETUS, ANTEPARTUM, SINGLE OR UNSPECIFIED FETUS: ICD-10-CM

## 2022-04-11 DIAGNOSIS — O99.419 MATERNAL CARDIOVASCULAR DISEASE AFFECTING PREGNANCY, ANTEPARTUM: ICD-10-CM

## 2022-04-11 DIAGNOSIS — O99.891 CURRENT MATERNAL CONDITION AFFECTING PREGNANCY: ICD-10-CM

## 2022-04-11 DIAGNOSIS — O26.10 LOW WEIGHT GAIN DURING PREGNANCY, ANTEPARTUM: ICD-10-CM

## 2022-04-11 DIAGNOSIS — Z31.49 ENCOUNTER FOR INVESTIGATION AND TESTING FOR PROCREATIVE MANAGEMENT: Primary | ICD-10-CM

## 2022-04-11 DIAGNOSIS — I25.10 MATERNAL CARDIOVASCULAR DISEASE AFFECTING PREGNANCY, ANTEPARTUM: ICD-10-CM

## 2022-04-11 PROCEDURE — 99205 OFFICE O/P NEW HI 60 MIN: CPT | Performed by: OBSTETRICS & GYNECOLOGY

## 2022-04-11 PROCEDURE — G8420 CALC BMI NORM PARAMETERS: HCPCS | Performed by: OBSTETRICS & GYNECOLOGY

## 2022-04-11 PROCEDURE — G8427 DOCREV CUR MEDS BY ELIG CLIN: HCPCS | Performed by: OBSTETRICS & GYNECOLOGY

## 2022-04-11 RX ORDER — TRAMADOL HYDROCHLORIDE 50 MG/1
50 TABLET ORAL EVERY 6 HOURS PRN
COMMUNITY

## 2022-04-12 LAB
SEND OUT REPORT: NORMAL
TEST NAME: NORMAL

## 2022-04-14 ENCOUNTER — TELEPHONE (OUTPATIENT)
Dept: PERINATAL CARE | Age: 22
End: 2022-04-14

## 2022-04-14 NOTE — TELEPHONE ENCOUNTER
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 Patient requests clinical correlation via genetics consulation/consideration of partner testing.  Reviewed process and patient requests referral.

## 2022-04-18 DIAGNOSIS — N94.6 DYSMENORRHEA: ICD-10-CM

## 2022-04-18 DIAGNOSIS — F39 MOOD DISORDER (HCC): ICD-10-CM

## 2022-04-18 DIAGNOSIS — Q79.62 HYPERMOBILE EHLERS-DANLOS SYNDROME: ICD-10-CM

## 2022-04-18 DIAGNOSIS — F41.9 ANXIETY: ICD-10-CM

## 2022-04-18 DIAGNOSIS — G90.A POTS (POSTURAL ORTHOSTATIC TACHYCARDIA SYNDROME): ICD-10-CM

## 2022-04-18 DIAGNOSIS — N92.1 MENORRHAGIA WITH IRREGULAR CYCLE: ICD-10-CM

## 2022-04-18 DIAGNOSIS — K59.09 CHRONIC CONSTIPATION: ICD-10-CM

## 2022-04-18 DIAGNOSIS — M25.50 ARTHRALGIA, UNSPECIFIED JOINT: ICD-10-CM

## 2022-04-18 DIAGNOSIS — F32.A DEPRESSION, UNSPECIFIED DEPRESSION TYPE: ICD-10-CM

## 2022-04-18 DIAGNOSIS — M62.838 MUSCLE SPASM: ICD-10-CM

## 2022-04-18 RX ORDER — BACLOFEN 10 MG/1
10 TABLET ORAL 2 TIMES DAILY
Qty: 60 TABLET | Refills: 0 | Status: SHIPPED | OUTPATIENT
Start: 2022-04-18 | End: 2022-04-25 | Stop reason: SDUPTHER

## 2022-04-18 NOTE — TELEPHONE ENCOUNTER
Esther Retana is requesting a refill on the following medication(s):  Requested Prescriptions     Pending Prescriptions Disp Refills    baclofen (LIORESAL) 10 MG tablet 60 tablet 5     Sig: Take 1 tablet by mouth 2 times daily       Last Visit Date (If Applicable):  3/4/8816    Next Visit Date:    Visit date not found

## 2022-04-25 ENCOUNTER — OFFICE VISIT (OUTPATIENT)
Dept: INTERNAL MEDICINE | Age: 22
End: 2022-04-25
Payer: COMMERCIAL

## 2022-04-25 VITALS
HEIGHT: 68 IN | DIASTOLIC BLOOD PRESSURE: 81 MMHG | WEIGHT: 129 LBS | OXYGEN SATURATION: 98 % | SYSTOLIC BLOOD PRESSURE: 124 MMHG | RESPIRATION RATE: 18 BRPM | HEART RATE: 114 BPM | TEMPERATURE: 97.8 F | BODY MASS INDEX: 19.55 KG/M2

## 2022-04-25 DIAGNOSIS — N94.6 DYSMENORRHEA: ICD-10-CM

## 2022-04-25 DIAGNOSIS — K62.5 RECTAL BLEEDING: ICD-10-CM

## 2022-04-25 DIAGNOSIS — F32.A DEPRESSION, UNSPECIFIED DEPRESSION TYPE: ICD-10-CM

## 2022-04-25 DIAGNOSIS — M25.50 ARTHRALGIA, UNSPECIFIED JOINT: ICD-10-CM

## 2022-04-25 DIAGNOSIS — K59.09 CHRONIC CONSTIPATION: ICD-10-CM

## 2022-04-25 DIAGNOSIS — Q79.62 HYPERMOBILE EHLERS-DANLOS SYNDROME: ICD-10-CM

## 2022-04-25 DIAGNOSIS — F41.9 ANXIETY: ICD-10-CM

## 2022-04-25 DIAGNOSIS — G90.A POTS (POSTURAL ORTHOSTATIC TACHYCARDIA SYNDROME): ICD-10-CM

## 2022-04-25 DIAGNOSIS — G47.01 INSOMNIA DUE TO MEDICAL CONDITION: ICD-10-CM

## 2022-04-25 DIAGNOSIS — F32.4 MAJOR DEPRESSIVE DISORDER WITH SINGLE EPISODE, IN PARTIAL REMISSION (HCC): Primary | ICD-10-CM

## 2022-04-25 DIAGNOSIS — F39 MOOD DISORDER (HCC): ICD-10-CM

## 2022-04-25 DIAGNOSIS — M62.838 MUSCLE SPASM: ICD-10-CM

## 2022-04-25 DIAGNOSIS — N92.1 MENORRHAGIA WITH IRREGULAR CYCLE: ICD-10-CM

## 2022-04-25 DIAGNOSIS — M54.32 SCIATICA OF LEFT SIDE: ICD-10-CM

## 2022-04-25 LAB
ALBUMIN SERPL-MCNC: 4.5 G/DL (ref 3.5–5.2)
ALP BLD-CCNC: 97 U/L (ref 35–104)
ALT SERPL-CCNC: 15 U/L (ref 0–32)
ANION GAP SERPL CALCULATED.3IONS-SCNC: 9 MMOL/L (ref 7–16)
AST SERPL-CCNC: 17 U/L (ref 0–31)
BASOPHILS ABSOLUTE: 0.05 E9/L (ref 0–0.2)
BASOPHILS RELATIVE PERCENT: 1.1 % (ref 0–2)
BILIRUB SERPL-MCNC: 0.6 MG/DL (ref 0–1.2)
BUN BLDV-MCNC: 12 MG/DL (ref 6–20)
CALCIUM SERPL-MCNC: 9.2 MG/DL (ref 8.6–10.2)
CHLORIDE BLD-SCNC: 104 MMOL/L (ref 98–107)
CO2: 26 MMOL/L (ref 22–29)
CREAT SERPL-MCNC: 0.6 MG/DL (ref 0.5–1)
EOSINOPHILS ABSOLUTE: 0.1 E9/L (ref 0.05–0.5)
EOSINOPHILS RELATIVE PERCENT: 2.3 % (ref 0–6)
GFR AFRICAN AMERICAN: >60
GFR NON-AFRICAN AMERICAN: >60 ML/MIN/1.73
GLUCOSE BLD-MCNC: 89 MG/DL (ref 74–99)
HCT VFR BLD CALC: 42.1 % (ref 34–48)
HEMOGLOBIN: 13.2 G/DL (ref 11.5–15.5)
IMMATURE GRANULOCYTES #: 0.01 E9/L
IMMATURE GRANULOCYTES %: 0.2 % (ref 0–5)
LYMPHOCYTES ABSOLUTE: 1.88 E9/L (ref 1.5–4)
LYMPHOCYTES RELATIVE PERCENT: 42.7 % (ref 20–42)
MCH RBC QN AUTO: 28.7 PG (ref 26–35)
MCHC RBC AUTO-ENTMCNC: 31.4 % (ref 32–34.5)
MCV RBC AUTO: 91.5 FL (ref 80–99.9)
MONOCYTES ABSOLUTE: 0.44 E9/L (ref 0.1–0.95)
MONOCYTES RELATIVE PERCENT: 10 % (ref 2–12)
NEUTROPHILS ABSOLUTE: 1.92 E9/L (ref 1.8–7.3)
NEUTROPHILS RELATIVE PERCENT: 43.7 % (ref 43–80)
PDW BLD-RTO: 12.3 FL (ref 11.5–15)
PLATELET # BLD: 358 E9/L (ref 130–450)
PMV BLD AUTO: 9 FL (ref 7–12)
POTASSIUM SERPL-SCNC: 4.5 MMOL/L (ref 3.5–5)
RBC # BLD: 4.6 E12/L (ref 3.5–5.5)
SODIUM BLD-SCNC: 139 MMOL/L (ref 132–146)
TOTAL PROTEIN: 7 G/DL (ref 6.4–8.3)
TSH SERPL DL<=0.05 MIU/L-ACNC: 1.61 UIU/ML (ref 0.27–4.2)
WBC # BLD: 4.4 E9/L (ref 4.5–11.5)

## 2022-04-25 PROCEDURE — 99212 OFFICE O/P EST SF 10 MIN: CPT | Performed by: INTERNAL MEDICINE

## 2022-04-25 PROCEDURE — G8420 CALC BMI NORM PARAMETERS: HCPCS | Performed by: INTERNAL MEDICINE

## 2022-04-25 PROCEDURE — G8427 DOCREV CUR MEDS BY ELIG CLIN: HCPCS | Performed by: INTERNAL MEDICINE

## 2022-04-25 PROCEDURE — 99213 OFFICE O/P EST LOW 20 MIN: CPT | Performed by: INTERNAL MEDICINE

## 2022-04-25 PROCEDURE — 99204 OFFICE O/P NEW MOD 45 MIN: CPT | Performed by: INTERNAL MEDICINE

## 2022-04-25 PROCEDURE — 1036F TOBACCO NON-USER: CPT | Performed by: INTERNAL MEDICINE

## 2022-04-25 RX ORDER — ZOLPIDEM TARTRATE 10 MG/1
TABLET ORAL NIGHTLY PRN
COMMUNITY
End: 2022-04-25 | Stop reason: SDUPTHER

## 2022-04-25 RX ORDER — BACLOFEN 10 MG/1
10 TABLET ORAL 2 TIMES DAILY
Qty: 60 TABLET | Refills: 0 | Status: SHIPPED
Start: 2022-04-25 | End: 2022-06-27

## 2022-04-25 RX ORDER — GABAPENTIN 300 MG/1
300 CAPSULE ORAL 2 TIMES DAILY
Qty: 60 CAPSULE | Refills: 3 | Status: SHIPPED
Start: 2022-04-25 | End: 2022-09-12

## 2022-04-25 RX ORDER — ZOLPIDEM TARTRATE 10 MG/1
10 TABLET ORAL NIGHTLY PRN
Qty: 10 TABLET | Refills: 0 | Status: SHIPPED | OUTPATIENT
Start: 2022-04-25 | End: 2022-05-05

## 2022-04-25 ASSESSMENT — ENCOUNTER SYMPTOMS
COUGH: 0
SHORTNESS OF BREATH: 0

## 2022-04-25 NOTE — PROGRESS NOTES
[facility] Internal Medicine      SUBJECTIVE:  Jono Kirk (:  2000) is a 24 y.o. adult here for evaluation of the following chief complaint(s):  Establish Care  No new issues today. 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 Would like to have this followed up on. Ehler -Danlos syndrome (EDS) - had hip surgery with screws and was done in March 10, 2021 Jose A Munguia Marcos Owensder- 2021. Some difficulty with recovery on the L. Performed in [facility]. Dr. Amarilys Denney. L hip screws were removed 2022. Feeling better now. Had injection of the hip with Dr. Ian Vanessa on 2022. Would like to hold off on ortho referral in this area for now. Undergoing therapy at [facility] in [facility]. Doing well with this. Continue present management. Additional local referral as patient prefers. POTS syndrome secondary to EDS. On Florinef. Continue same treatment   Check CMP. Seen on 2022 for perinatology visit with Dr. Tyesha Valentine. The purpose of this visit was to assess safety of pregnancy as well as risk of a child having Demian Ogden syndrome as well. Was referred for genetic counseling on 2022. This was for a genetic variant of uncertain significance. Referred by Dr. Stephanie Del Rosario office to further evaluate this variant. Anxiety/Depression/Bipolar disorder -  - has been stable on these medications for some time. These have been managed by previous PCP. Continue same medications. Reports blood each time has a bowel movement. Exam unremarkable. Refer to general surgery for further evaluation and possible colonoscopy. Erick Boyd. Due for yearly check-up     Originally from [facility].       Past Medical History:   Diagnosis Date    Anxiety     Autism spectrum disorder 2019    Bipolar disorder ([facility] [address]     Depression     Dysmenorrhea     Christen-Danlos syndrome type III 2019    GERD (gastroesophageal reflux disease)     Headache     Menorrhagia     Postural orthostatic tachycardia syndrome 2014    Syringomyelia ([facility] [address] 2019    Urinary incontinence      Past Surgical History:   Procedure Laterality Date    CHOLECYSTECTOMY      WISDOM TOOTH EXTRACTION  2019         Review of Systems   HENT: Positive for trouble swallowing (occasional hyoid dislocation which makes it difficult to swallow). Negative for congestion, hearing loss, rhinorrhea, sore throat and tinnitus. Occasional ear pain on L with wearing of ear plugs. Eyes: Negative for visual disturbance. Respiratory: Negative for cough, chest tightness and shortness of breath. Cardiovascular: Negative for chest pain and palpitations. Gastrointestinal: Positive for blood in stool (see HPI). Negative for abdominal pain, constipation, diarrhea and vomiting. Endocrine: Negative for polydipsia, polyphagia and polyuria. Genitourinary: Negative for dysuria, flank pain, hematuria, vaginal bleeding and vaginal discharge. Musculoskeletal: Positive for myalgias. Allergic/Immunologic: Negative for immunocompromised state. Neurological: Negative for dizziness, speech difficulty and light-headedness. Hematological: Negative for adenopathy. Psychiatric/Behavioral: Positive for dysphoric mood. The patient is nervous/anxious. Controlled symptoms on current medications. Permanent retainer on the bottom teeth. Current Outpatient Medications on File Prior to Visit   Medication Sig Dispense Refill    traMADol (ULTRAM) 50 MG tablet Take 50 mg by mouth every 6 hours as needed.  Indications: Pain       busPIRone (BUSPAR) 10 MG tablet take 1 tablet by mouth twice a day (Patient taking differently: 10 mg Indications: Feeling Anxious ) 60 tablet 5    lamoTRIgine (LAMICTAL) 100 MG tablet take 1 tablet by mouth once daily (Patient taking differently: Take 100 mg by mouth daily Indications: Mood Disorder take 1 tablet by mouth once daily) 30 tablet 5    DULoxetine (CYMBALTA) 30 MG extended release capsule take 1 capsule by mouth twice a day (Patient taking differently: Indications: Depression take 1 capsule by mouth twice a day) 180 capsule 5    fludrocortisone (FLORINEF) 0.1 MG tablet take 1 tablet by mouth once daily (Patient taking differently: Take 0.1 mg by mouth daily Indications: Postural Orthostatic Tachycardia take 1 tablet by mouth once daily) 90 tablet 3    famotidine (PEPCID) 20 MG tablet Take 20 mg by mouth 2 times daily Taking as needed before ibuprofen      Levonorgestrel [facility]) IUD 19.5 mg 19.5 each by IntraUTERine route Dec 2020 inserted, remove 5 years  Manage endometriosis, not confirmed      Cholecalciferol 50 MCG (2000 UT) TABS Take 2,000 Units by mouth nightly      loratadine (CLARITIN) 10 MG capsule Take 10 mg by mouth daily Indications: Allergic Rhinitis        No current facility-administered medications on file prior to visit. OBJECTIVE:    VS:   Vitals:    04/25/22 1101   BP: 124/81   Site: Left Upper Arm   Position: Sitting   Cuff Size: Medium Adult   Pulse: 114   Resp: 18   Temp: 97.8 °F (36.6 °C)   TempSrc: Temporal   SpO2: 98%   Weight: 129 lb (58.5 kg)   Height: 5' 8\" (1.727 m)     Physical Exam   HEENT:  PERRL, EOMI, Mouth without erythema or exudate. TMs clear B. Lungs:  CTA B, no vertebral column tenderness to palpation, no flank tenderness to percussion of flanks B. Neck:   No carotid bruits appreciated B. No LAD appreciated. CVS:  +s1/s2 without m/g/r appreciated. Abd:  + BS, NTND, No renal or aortic bruits, No hepatosplenomegaly appreciated. Extr:  2+ DP/PT pulses B, no pitting edema, +brace on L knee. Neurological exam reveals alert, oriented, normal speech, no focal findings or movement disorder noted, cranial nerves II through XII intact, DTR's increased and symmetric, normal muscle tone, no tremors, strength 5/5, normal gait and station.   No pronator drift. No clonus. RTC:  Return in about 4 weeks (around 5/23/2022).       Omar Cates MD   4/26/2022 2:42 PM

## 2022-04-25 NOTE — PROGRESS NOTES
Patient has not knowingly had unprotected exposire to anyone positive for COVID-10 within the last 14 days DENIES    AND does not have the following signs or symptoms:    A. One of the followin. Fever greater than 100.0 F NEGATIVE       2. Cough NEGATIVE       3. New onset shortness of breath NEGATIVE       4. New onset difficulty breathing NEGATIVE    AND/OR    B. Two or more of the following criteria:        1. Muscle aches NEGATIVE       2. Headache NEGATIVE       3. Sore Throat NEGATIVE       4. New onset loss of smell or taste NEGATIVE       5. New onset diarrhea NEGATIVE       6. Chills NEGATIVE       7. Runny nose NEGATIVE       8. Sneezing NEGATIVE  Carol Bloom LPN     Patient given instruction by Dr Robbie Alexander. One month follow up scheduled. Printed AVS given to patient.   Carol Bloom LPN

## 2022-04-26 ENCOUNTER — TELEPHONE (OUTPATIENT)
Dept: SURGERY | Age: 22
End: 2022-04-26

## 2022-04-26 ASSESSMENT — ENCOUNTER SYMPTOMS
CHEST TIGHTNESS: 0
TROUBLE SWALLOWING: 1
ABDOMINAL PAIN: 0
SORE THROAT: 0
BLOOD IN STOOL: 1
CONSTIPATION: 0
VOMITING: 0
DIARRHEA: 0
RHINORRHEA: 0

## 2022-04-26 NOTE — TELEPHONE ENCOUNTER
MA made first attempt to contact patient to schedule appointment based on referral by Dr Loni Murrieta for rectal bleeding consult. Patient did not answer so MA left  requesting return call to schedule w/ first available provider.      Electronically signed by Sara Denver on 4/26/22 at 1:24 PM EDT

## 2022-04-28 ENCOUNTER — TELEPHONE (OUTPATIENT)
Dept: PERINATAL CARE | Age: 22
End: 2022-04-28

## 2022-04-28 NOTE — TELEPHONE ENCOUNTER
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 2-gene specific carrier lab orders for [facility] prepared for partner. Not currently pregnant, test requested for preconception counseling.

## 2022-05-02 ENCOUNTER — TELEPHONE (OUTPATIENT)
Dept: PERINATAL CARE | Age: 22
End: 2022-05-02

## 2022-05-02 NOTE — TELEPHONE ENCOUNTER
Via fax, [facility] requested detail for patient's EDS referrral. Detail sent by fax. See scanned media.

## 2022-05-04 ENCOUNTER — TELEPHONE (OUTPATIENT)
Dept: SURGERY | Age: 22
End: 2022-05-04

## 2022-05-04 NOTE — TELEPHONE ENCOUNTER
MA made second attempt to contact patient to schedule appointment based on referral by Dr Rebeca Martines. Patient did not answer so MA left  requesting return call to schedule .     Electronically signed by Cyril Silva on 5/4/22 at 3:22 PM EDT

## 2022-05-09 ENCOUNTER — OFFICE VISIT (OUTPATIENT)
Dept: SURGERY | Age: 22
End: 2022-05-09
Payer: COMMERCIAL

## 2022-05-09 VITALS
TEMPERATURE: 97.8 F | RESPIRATION RATE: 16 BRPM | HEART RATE: 103 BPM | OXYGEN SATURATION: 98 % | WEIGHT: 128.9 LBS | SYSTOLIC BLOOD PRESSURE: 100 MMHG | HEIGHT: 68 IN | BODY MASS INDEX: 19.54 KG/M2 | DIASTOLIC BLOOD PRESSURE: 72 MMHG

## 2022-05-09 DIAGNOSIS — K64.8 INTERNAL HEMORRHOIDS: ICD-10-CM

## 2022-05-09 PROCEDURE — G8420 CALC BMI NORM PARAMETERS: HCPCS | Performed by: SURGERY

## 2022-05-09 PROCEDURE — G8427 DOCREV CUR MEDS BY ELIG CLIN: HCPCS | Performed by: SURGERY

## 2022-05-09 PROCEDURE — 99203 OFFICE O/P NEW LOW 30 MIN: CPT | Performed by: SURGERY

## 2022-05-09 PROCEDURE — 1036F TOBACCO NON-USER: CPT | Performed by: SURGERY

## 2022-05-09 PROCEDURE — 99202 OFFICE O/P NEW SF 15 MIN: CPT | Performed by: SURGERY

## 2022-05-09 RX ORDER — LIDOCAINE 50 MG/G
OINTMENT TOPICAL
Qty: 30 G | Refills: 1 | Status: SHIPPED | OUTPATIENT
Start: 2022-05-09

## 2022-05-09 RX ORDER — HYDROCORTISONE ACETATE 25 MG/1
25 SUPPOSITORY RECTAL 2 TIMES DAILY PRN
Qty: 12 SUPPOSITORY | Refills: 1 | Status: SHIPPED | OUTPATIENT
Start: 2022-05-09

## 2022-05-09 ASSESSMENT — ENCOUNTER SYMPTOMS
DIARRHEA: 0
COUGH: 0
ALLERGIC/IMMUNOLOGIC NEGATIVE: 1
ABDOMINAL PAIN: 0
NAUSEA: 0
ABDOMINAL DISTENTION: 0
RESPIRATORY NEGATIVE: 1
EYES NEGATIVE: 1
ANAL BLEEDING: 1
RECTAL PAIN: 1
BACK PAIN: 0
CONSTIPATION: 1
BLOOD IN STOOL: 0
SHORTNESS OF BREATH: 0
VOMITING: 0

## 2022-05-09 NOTE — PROGRESS NOTES
[facility]  HISTORY & PHYSICAL  ATTENDING NOTE    Chief Complaint   Patient presents with    Hemorrhoids     pt states that she believes that she may have hemorrhoids. pt states that there is some bleeding at times  with bowel movements. HPI:  20y/o them with years of blood when they wipe, pain with defecation. Pain occurred first.  BM once per week. Mag citrate does not work. Caffeine/coffee works, but cannot have anymore because will kept them awake for 3 days straight. Has tried extra fiber, take magnesium supplements. Senokot did work for them, but did take a while to start working. They are really fixated on being able to have anal sex and the pain that they are having when they do this. They have a lot of throbbing with this action. It appears that the bleeding is most often happening when they have a bowel movement.     Past Medical History:   Diagnosis Date    Anxiety     Autism spectrum disorder 2019    Bipolar disorder ([address]     Depression     Dysmenorrhea     Christen-Danlos syndrome type III 2019    GERD (gastroesophageal reflux disease)     Headache     Menorrhagia     Postural orthostatic tachycardia syndrome 2014    Syringomyelia ([address] 2019    Urinary incontinence      Past Surgical History:   Procedure Laterality Date    CHOLECYSTECTOMY      HIP SURGERY      WISDOM TOOTH EXTRACTION  2019     Family History   Problem Relation Age of Onset    Heart Disease Mother     Other Mother         Spinocerebellar ataxia    Mental Illness Mother     Miscarriages / Stillbirths Mother     Depression Mother     High Cholesterol Mother     Mental Illness Father     Depression Father     Heart Disease Sister     Mental Illness Brother     Learning Disabilities Brother     Dementia Maternal Grandmother     Heart Attack Maternal Grandfather     Heart Disease Maternal Grandfather     High Cholesterol Maternal Grandfather     Alcohol Abuse Maternal Grandfather     Heart Attack Paternal Grandfather     Heart Disease Paternal Grandfather     Heart Attack Maternal Uncle     Heart Disease Maternal Uncle     Mental Illness Paternal Aunt     Substance Abuse Paternal Aunt     Mental Illness Paternal Uncle     Substance Abuse Paternal Uncle     Alcohol Abuse Paternal Uncle     Substance Abuse Paternal Cousin      Social History     Tobacco Use    Smoking status: Never Smoker    Smokeless tobacco: Never Used   Vaping Use    Vaping Use: Not on file   Substance Use Topics    Alcohol use: Never    Drug use: Never     Allergies   Allergen Reactions    Reglan [Metoclopramide]      Patient states it causes eps.  Abilify [Aripiprazole]      Patient states this medication gives her tremors and shaking.  Aloe Vera Rash     Current Outpatient Medications   Medication Sig Dispense Refill    lidocaine (XYLOCAINE) 5 % ointment Apply topically as needed. 30 g 1    hydrocortisone (ANUSOL-HC) 25 MG suppository Place 1 suppository rectally 2 times daily as needed for Hemorrhoids 12 suppository 1    baclofen (LIORESAL) 10 MG tablet Take 1 tablet by mouth 2 times daily 60 tablet 0    gabapentin (NEURONTIN) 300 MG capsule Take 1 capsule by mouth 2 times daily for 120 days. 60 capsule 3    traMADol (ULTRAM) 50 MG tablet Take 50 mg by mouth every 6 hours as needed.  Indications: Pain       busPIRone (BUSPAR) 10 MG tablet take 1 tablet by mouth twice a day (Patient taking differently: 10 mg Indications: Feeling Anxious ) 60 tablet 5    lamoTRIgine (LAMICTAL) 100 MG tablet take 1 tablet by mouth once daily (Patient taking differently: Take 100 mg by mouth daily Indications: Mood Disorder take 1 tablet by mouth once daily) 30 tablet 5    DULoxetine (CYMBALTA) 30 MG extended release capsule take 1 capsule by mouth twice a day (Patient taking differently: Indications: Depression take 1 capsule by mouth twice a day) 180 capsule 5    fludrocortisone (FLORINEF) 0.1 MG tablet take 1 tablet by mouth once daily (Patient taking differently: Take 0.1 mg by mouth daily Indications: Postural Orthostatic Tachycardia take 1 tablet by mouth once daily) 90 tablet 3    Levonorgestrel [facility]) IUD 19.5 mg 19.5 each by IntraUTERine route Dec 2020 inserted, remove 5 years  Manage endometriosis, not confirmed      Cholecalciferol 50 MCG (2000 UT) TABS Take 2,000 Units by mouth nightly      loratadine (CLARITIN) 10 MG capsule Take 10 mg by mouth daily Indications: Allergic Rhinitis       famotidine (PEPCID) 20 MG tablet Take 20 mg by mouth 2 times daily Taking as needed before ibuprofen (Patient not taking: Reported on 5/9/2022)       No current facility-administered medications for this visit. Review of Systems   Constitutional: Negative. Negative for activity change, appetite change and unexpected weight change. HENT: Negative. Eyes: Negative. Respiratory: Negative. Negative for cough and shortness of breath. Cardiovascular: Negative. Negative for chest pain and leg swelling. Gastrointestinal: Positive for anal bleeding, constipation and rectal pain. Negative for abdominal distention, abdominal pain, blood in stool, diarrhea, nausea and vomiting. Endocrine: Negative. Genitourinary: Negative. Musculoskeletal: Negative. Negative for arthralgias, back pain, gait problem, joint swelling and myalgias. Skin: Negative. Allergic/Immunologic: Negative. Neurological: Negative. Negative for dizziness, weakness and headaches. Hematological: Negative. Psychiatric/Behavioral: Negative. Negative for confusion, decreased concentration and sleep disturbance. /72 (Site: Left Upper Arm, Position: Sitting, Cuff Size: Large Adult)   Pulse 103   Temp 97.8 °F (36.6 °C) (Infrared)   Resp 16   Ht 5' 8\" (1.727 m)   Wt 128 lb 14.4 oz (58.5 kg)   SpO2 98%   BMI 19.60 kg/m²   Physical Exam  Constitutional:       Appearance: Normal appearance.    HENT: Head: Normocephalic and atraumatic. Nose: Nose normal.      Mouth/Throat:      Mouth: Mucous membranes are moist.      Pharynx: Oropharynx is clear. Eyes:      Extraocular Movements: Extraocular movements intact. Pupils: Pupils are equal, round, and reactive to light. Cardiovascular:      Rate and Rhythm: Normal rate. Pulses: Normal pulses. Pulmonary:      Effort: Pulmonary effort is normal.   Genitourinary:     Rectum: Tenderness and internal hemorrhoid present. No mass, anal fissure or external hemorrhoid. Normal anal tone. Musculoskeletal:         General: No tenderness or signs of injury. Cervical back: Normal range of motion and neck supple. Skin:     General: Skin is warm and dry. Neurological:      General: No focal deficit present. Mental Status: He is alert and oriented to person, place, and time. Psychiatric:         Mood and Affect: Mood normal.         Behavior: Behavior normal.         Thought Content: Thought content normal.         Judgment: Judgment normal.       ASSESSMENT/PLAN:  Grade 1 internal hemorrhoids  --Advised patient to use the Senokot they have at home  --Lidocaine jelly prescribed  --Anusol prescribed  --Sitz bath's advise  --I explained to patient that we should try medications first and bowel health before going forward with any surgical interventions. Next option would either be banding here in the office versus going to the operating room for exam under anesthesia and hemorrhoidectomy.     Return to clinic 6 to 8 weeks    Wilmar Barrios MD, MSc, FACS  5/9/2022  11:54 AM

## 2022-05-24 NOTE — PROGRESS NOTES
[facility] Internal Medicine      SUBJECTIVE:  Corinne Macias (:  2000) is a 24 y.o. adult here for evaluation of the following chief complaint(s):  Follow-up  Ehler-Danlos syndrome - PT at [facility] in [place]. Has not been using tramadol frequently. Getting CT of hip on 2022 for ongoing pain. Dr. Bill Calero who did the hip surgery has ordered this. Injection is wearing off. Pain is at a 5/10 in severity. CT being completed in [facility]. MRI follow-up of spine? Await results and orthopedic recommendations. POTS syndrome - CMP normal at last check. On Florinef. Anxiety/Depression/Bipolar - stable at last visit without change in medications at last visit. Genetic testing of her  was requested for the carrier state  For 2 autosomal recessive disorders prior to decision to become pregnant. This is being processed through [facility]. This is awaiting further information. 2022 saw general surgery for rectal bleeding. Advised to use Senokot and lidocaine jelly was prescribed along with Anusol and Sitz baths. Patient to return in 6-8 weeks from that visit. They will be scheduling a follow-up once their schedule is released. Follow-up with surgery    Migraine - having more frequently. Triggered by scents. These are occurring one time per week. +aura. Tries to help with Tylenol. This works at times but has ongoing brain fog. Will consider triptan therapy and prevention with other medications. Will run interaction. Had rash that was itchy on Left forearm last week. This resolved mostly with benadryl. May have been from exposure to clinic. Review of Systems    Current Outpatient Medications on File Prior to Visit   Medication Sig Dispense Refill    lidocaine (XYLOCAINE) 5 % ointment Apply topically as needed.  30 g 1    hydrocortisone (ANUSOL-HC) 25 MG suppository Place 1 suppository rectally 2 times daily as needed for Hemorrhoids 12 suppository 1    baclofen (LIORESAL) 10 MG tablet Take 1 tablet by mouth 2 times daily 60 tablet 0    gabapentin (NEURONTIN) 300 MG capsule Take 1 capsule by mouth 2 times daily for 120 days. 60 capsule 3    traMADol (ULTRAM) 50 MG tablet Take 50 mg by mouth every 6 hours as needed. Indications: Pain       busPIRone (BUSPAR) 10 MG tablet take 1 tablet by mouth twice a day (Patient taking differently: 10 mg Indications: Feeling Anxious ) 60 tablet 5    lamoTRIgine (LAMICTAL) 100 MG tablet take 1 tablet by mouth once daily (Patient taking differently: Take 100 mg by mouth daily Indications: Mood Disorder take 1 tablet by mouth once daily) 30 tablet 5    DULoxetine (CYMBALTA) 30 MG extended release capsule take 1 capsule by mouth twice a day (Patient taking differently: Indications: Depression take 1 capsule by mouth twice a day) 180 capsule 5    fludrocortisone (FLORINEF) 0.1 MG tablet take 1 tablet by mouth once daily (Patient taking differently: Take 0.1 mg by mouth daily Indications: Postural Orthostatic Tachycardia take 1 tablet by mouth once daily) 90 tablet 3    famotidine (PEPCID) 20 MG tablet Take 20 mg by mouth 2 times daily Taking as needed before ibuprofen      Levonorgestrel [facility] [place]) IUD 19.5 mg 19.5 each by IntraUTERine route Dec 2020 inserted, remove 5 years  Manage endometriosis, not confirmed      Cholecalciferol 50 MCG (2000 UT) TABS Take 2,000 Units by mouth nightly      loratadine (CLARITIN) 10 MG capsule Take 10 mg by mouth daily Indications: Allergic Rhinitis        No current facility-administered medications on file prior to visit.        OBJECTIVE:    VS:   Vitals:    05/25/22 0917   BP: 132/79   Site: Right Upper Arm   Position: Sitting   Cuff Size: Medium Adult   Pulse: (!) 105   Resp: 18   Temp: 97.4 °F (36.3 °C)   TempSrc: Temporal   SpO2: 99%   Weight: 129 lb (58.5 kg)   Height: 5' 8\" (1.727 m)     Physical Exam   Lungs:  CTA B  Neck: No carotid bruits appreciated B.   CVS:  +s1/s2 without m/g/r appreciated. Abd:  + BS, NTND, No renal or aortic bruits   Extr:  2+ DP/PT pulses B, no pitting edema      RTC:  Return in about 3 months (around 8/25/2022).           Fariha Blanco MD   5/25/2022 9:47 AM

## 2022-05-25 ENCOUNTER — OFFICE VISIT (OUTPATIENT)
Dept: INTERNAL MEDICINE | Age: 22
End: 2022-05-25
Payer: COMMERCIAL

## 2022-05-25 VITALS
BODY MASS INDEX: 19.55 KG/M2 | TEMPERATURE: 97.4 F | RESPIRATION RATE: 18 BRPM | HEIGHT: 68 IN | DIASTOLIC BLOOD PRESSURE: 79 MMHG | HEART RATE: 105 BPM | WEIGHT: 129 LBS | OXYGEN SATURATION: 99 % | SYSTOLIC BLOOD PRESSURE: 132 MMHG

## 2022-05-25 DIAGNOSIS — Z65.9 SOCIAL PROBLEM: Primary | ICD-10-CM

## 2022-05-25 PROCEDURE — G8427 DOCREV CUR MEDS BY ELIG CLIN: HCPCS | Performed by: INTERNAL MEDICINE

## 2022-05-25 PROCEDURE — 99213 OFFICE O/P EST LOW 20 MIN: CPT | Performed by: INTERNAL MEDICINE

## 2022-05-25 PROCEDURE — 1036F TOBACCO NON-USER: CPT | Performed by: INTERNAL MEDICINE

## 2022-05-25 PROCEDURE — G8420 CALC BMI NORM PARAMETERS: HCPCS | Performed by: INTERNAL MEDICINE

## 2022-05-25 NOTE — PROGRESS NOTES
Patient has not knowingly had unprotected exposire to anyone positive for COVID-10 within the last 14 days DENIES    AND does not have the following signs or symptoms:    A. One of the followin. Fever greater than 100.0 F NEGATIVE       2. Cough NEGATIVE       3. New onset shortness of breath NEGATIVE       4. New onset difficulty breathing NEGATIVE    AND/OR    B. Two or more of the following criteria:        1. Muscle aches NEGATIVE       2. Headache NEGATIVE       3. Sore Throat NEGATIVE       4. New onset loss of smell or taste NEGATIVE       5. New onset diarrhea NEGATIVE       6. Chills NEGATIVE       7. Runny nose NEGATIVE       8. Sneezing NEGATIVE  Haim Darnell LPN    3 month follow and AVS printed.   Haim Darnell LPN'

## 2022-05-31 ENCOUNTER — TELEPHONE (OUTPATIENT)
Dept: INTERNAL MEDICINE | Age: 22
End: 2022-05-31

## 2022-06-01 ENCOUNTER — TELEPHONE (OUTPATIENT)
Dept: INTERNAL MEDICINE | Age: 22
End: 2022-06-01

## 2022-06-01 NOTE — TELEPHONE ENCOUNTER
2nd message left for pt return call to [place] [facility] NOTE  Date of Service: 2022  Barbara Sal ) MRN: [id number] [facility]: [id number]     Physical Exam  DOL: 80 [facility], [facility] Comment: See note in Epic / [facility]     infant  [facility] Henry Vo MD   Date/Time: 2022 17:53

## 2022-06-02 ENCOUNTER — TELEPHONE (OUTPATIENT)
Dept: INTERNAL MEDICINE | Age: 22
End: 2022-06-02

## 2022-06-02 NOTE — TELEPHONE ENCOUNTER
Pt asking for a call about they're migraine meds .  Pt  States Dr Dawson Every was gonna review med's and call them

## 2022-06-06 RX ORDER — UBROGEPANT 50 MG/1
50 TABLET ORAL DAILY PRN
Qty: 30 TABLET | Refills: 0 | Status: SHIPPED | OUTPATIENT
Start: 2022-06-06

## 2022-06-06 NOTE — TELEPHONE ENCOUNTER
In checking interaction for migraine medications. Triptans are not ideal and have potential interaction with her duloxetine due to potential for serotonin syndrome. Will discuss with patient potential addition of Ubrelvy, 50 mg for migraine headaches. Jamie Frias MD  6/6/2022      Discussed with Marie the medication above. They are agreeable to trying Ubrelvy. Will place order and I anticipate that this will require a prior authorization.       Jamie Frias MD  6/6/2022

## 2022-06-07 ENCOUNTER — TELEPHONE (OUTPATIENT)
Dept: INTERNAL MEDICINE | Age: 22
End: 2022-06-07

## 2022-06-09 ENCOUNTER — TELEPHONE (OUTPATIENT)
Dept: INTERNAL MEDICINE | Age: 22
End: 2022-06-09

## 2022-06-09 NOTE — TELEPHONE ENCOUNTER
Pt and LSW had left message between and connected on 6.8.22    Pt requesting information regarding applying for social security benefits. Explained difference of SSI and SSDI w work history and asset info re SSI as pt is . Present diagnosis are not on compassionate diagnosis list, thus explained application process as well as appeal  independently and with atty. As well as most likely  appeal process   Pt working reduced schedule at [employer] with [address]. Denies any psychiatric admissions and reports being stable on medication regiment . Did discuss [place] [employer] with Disabilities for opportunity of job training, accomodation services as well as disability determination services. Pt agreed for info to be mailed as well as advised these programs exist in all [facility] and [facility]. Present temporary address is due to spouse's present schooling.   Information mailed and pt encourage to call as needed>

## 2022-06-22 ENCOUNTER — OFFICE VISIT (OUTPATIENT)
Dept: INTERNAL MEDICINE | Age: 22
End: 2022-06-22
Payer: COMMERCIAL

## 2022-06-22 VITALS
DIASTOLIC BLOOD PRESSURE: 65 MMHG | HEART RATE: 83 BPM | SYSTOLIC BLOOD PRESSURE: 106 MMHG | BODY MASS INDEX: 19.85 KG/M2 | TEMPERATURE: 97.3 F | OXYGEN SATURATION: 97 % | WEIGHT: 131 LBS | RESPIRATION RATE: 18 BRPM | HEIGHT: 68 IN

## 2022-06-22 DIAGNOSIS — G43.109 MIGRAINE WITH AURA AND WITHOUT STATUS MIGRAINOSUS, NOT INTRACTABLE: Primary | ICD-10-CM

## 2022-06-22 PROCEDURE — 99213 OFFICE O/P EST LOW 20 MIN: CPT | Performed by: INTERNAL MEDICINE

## 2022-06-22 PROCEDURE — G8427 DOCREV CUR MEDS BY ELIG CLIN: HCPCS | Performed by: INTERNAL MEDICINE

## 2022-06-22 PROCEDURE — G8420 CALC BMI NORM PARAMETERS: HCPCS | Performed by: INTERNAL MEDICINE

## 2022-06-22 PROCEDURE — 1036F TOBACCO NON-USER: CPT | Performed by: INTERNAL MEDICINE

## 2022-06-22 NOTE — PROGRESS NOTES
[facility] Internal Medicine      SUBJECTIVE:  Rodrick Alexander (:  2000) is a 24 y.o. adult here for evaluation of the following chief complaint(s):  Migraine  Reports that the Beth Abdul has worked very well. Noticed that these began with mood changes and took the medication and felt better. Has taken this on 7 days, with once being a second dose. Reports triggers of certain smells at work (cigarette/perfume). Having headaches 7 - 8 times in the last 15 days. Will need to discuss with neurology which medication may be the best option. Referral may be necessary. Topamax as well as amitriptyline have interactions with other medications that would be concerning. Has upcoming orthopedic surgery in [facility]. Review of Systems as above. Current Outpatient Medications on File Prior to Visit   Medication Sig Dispense Refill    Ubrogepant (UBRELVY) 50 MG TABS Take 50 mg by mouth daily as needed (Migraine) May repeat dose after 2 hours if headache is still present. Do not exceed 2 tablets in 24 hours. 30 tablet 0    lidocaine (XYLOCAINE) 5 % ointment Apply topically as needed. 30 g 1    hydrocortisone (ANUSOL-HC) 25 MG suppository Place 1 suppository rectally 2 times daily as needed for Hemorrhoids 12 suppository 1    baclofen (LIORESAL) 10 MG tablet Take 1 tablet by mouth 2 times daily 60 tablet 0    gabapentin (NEURONTIN) 300 MG capsule Take 1 capsule by mouth 2 times daily for 120 days. 60 capsule 3    traMADol (ULTRAM) 50 MG tablet Take 50 mg by mouth every 6 hours as needed.  Indications: Pain       busPIRone (BUSPAR) 10 MG tablet take 1 tablet by mouth twice a day (Patient taking differently: 10 mg Indications: Feeling Anxious ) 60 tablet 5    lamoTRIgine (LAMICTAL) 100 MG tablet take 1 tablet by mouth once daily (Patient taking differently: Take 100 mg by mouth daily Indications: Mood Disorder take 1 tablet by mouth once daily) 30 tablet 5  DULoxetine (CYMBALTA) 30 MG extended release capsule take 1 capsule by mouth twice a day (Patient taking differently: Indications: Depression take 1 capsule by mouth twice a day) 180 capsule 5    fludrocortisone (FLORINEF) 0.1 MG tablet take 1 tablet by mouth once daily (Patient taking differently: Take 0.1 mg by mouth daily Indications: Postural Orthostatic Tachycardia take 1 tablet by mouth once daily) 90 tablet 3    famotidine (PEPCID) 20 MG tablet Take 20 mg by mouth 2 times daily Taking as needed before ibuprofen      Levonorgestrel [facility]) IUD 19.5 mg 19.5 each by IntraUTERine route Dec 2020 inserted, remove 5 years  Manage endometriosis, not confirmed      Cholecalciferol 50 MCG (2000 UT) TABS Take 2,000 Units by mouth nightly      loratadine (CLARITIN) 10 MG capsule Take 10 mg by mouth daily Indications: Allergic Rhinitis        No current facility-administered medications on file prior to visit. OBJECTIVE:    VS:   Vitals:    06/22/22 0940   BP: 106/65   Site: Left Upper Arm   Position: Sitting   Cuff Size: Medium Adult   Pulse: 83   Resp: 18   Temp: 97.3 °F (36.3 °C)   TempSrc: Temporal   SpO2: 97%   Weight: 131 lb (59.4 kg)   Height: 5' 8\" (1.727 m)     Physical Exam   Lungs:  CTA B  Neck:   No carotid bruits appreciated B.   CVS:  +s1/s2 without m/g/r appreciated. Abd:  + BS, NTND, No renal or aortic bruits   Extr:  2+ DP/PT pulses B, no pitting edema      RTC:  As previously scheduled.      Fariha Blanco MD   6/26/2022 10:33 AM

## 2022-06-22 NOTE — PROGRESS NOTES
Patient has not knowingly had unprotected exposire to anyone positive for COVID-10 within the last 14 days DENIES    AND does not have the following signs or symptoms:    A. One of the followin. Fever greater than 100.0 F NEGATIVE       2. Cough NEGATIVE       3. New onset shortness of breath NEGATIVE       4. New onset difficulty breathing NEGATIVE    AND/OR    B. Two or more of the following criteria:        1. Muscle aches NEGATIVE       2. Headache NEGATIVE       3. Sore Throat NEGATIVE       4. New onset loss of smell or taste NEGATIVE       5. New onset diarrhea NEGATIVE       6. Chills NEGATIVE       7. Runny nose NEGATIVE       8.  Sneezing NEGATIVE  Lyudmila Sosa LPN    Follow up already in place and AVS printed and handed to patient  Lyudmila Sosa LPN

## 2022-06-27 DIAGNOSIS — G90.A POTS (POSTURAL ORTHOSTATIC TACHYCARDIA SYNDROME): ICD-10-CM

## 2022-06-27 DIAGNOSIS — M25.50 ARTHRALGIA, UNSPECIFIED JOINT: ICD-10-CM

## 2022-06-27 DIAGNOSIS — N92.1 MENORRHAGIA WITH IRREGULAR CYCLE: ICD-10-CM

## 2022-06-27 DIAGNOSIS — F39 MOOD DISORDER (HCC): ICD-10-CM

## 2022-06-27 DIAGNOSIS — M62.838 MUSCLE SPASM: ICD-10-CM

## 2022-06-27 DIAGNOSIS — K59.09 CHRONIC CONSTIPATION: ICD-10-CM

## 2022-06-27 DIAGNOSIS — N94.6 DYSMENORRHEA: ICD-10-CM

## 2022-06-27 DIAGNOSIS — F41.9 ANXIETY: ICD-10-CM

## 2022-06-27 DIAGNOSIS — F32.A DEPRESSION, UNSPECIFIED DEPRESSION TYPE: ICD-10-CM

## 2022-06-27 DIAGNOSIS — Q79.62 HYPERMOBILE EHLERS-DANLOS SYNDROME: ICD-10-CM

## 2022-06-27 RX ORDER — BACLOFEN 10 MG/1
TABLET ORAL
Qty: 60 TABLET | Refills: 0 | Status: SHIPPED
Start: 2022-06-27 | End: 2022-08-03

## 2022-07-29 DIAGNOSIS — F41.9 ANXIETY: ICD-10-CM

## 2022-07-29 DIAGNOSIS — Q79.62 HYPERMOBILE EHLERS-DANLOS SYNDROME: ICD-10-CM

## 2022-07-29 DIAGNOSIS — G90.A POTS (POSTURAL ORTHOSTATIC TACHYCARDIA SYNDROME): ICD-10-CM

## 2022-07-29 DIAGNOSIS — N94.6 DYSMENORRHEA: ICD-10-CM

## 2022-07-29 DIAGNOSIS — M62.838 MUSCLE SPASM: ICD-10-CM

## 2022-07-29 DIAGNOSIS — F39 MOOD DISORDER (HCC): ICD-10-CM

## 2022-07-29 DIAGNOSIS — N92.1 MENORRHAGIA WITH IRREGULAR CYCLE: ICD-10-CM

## 2022-07-29 DIAGNOSIS — K59.09 CHRONIC CONSTIPATION: ICD-10-CM

## 2022-07-29 DIAGNOSIS — M25.50 ARTHRALGIA, UNSPECIFIED JOINT: ICD-10-CM

## 2022-07-29 DIAGNOSIS — F32.A DEPRESSION, UNSPECIFIED DEPRESSION TYPE: ICD-10-CM

## 2022-07-29 RX ORDER — BACLOFEN 10 MG/1
TABLET ORAL
Qty: 60 TABLET | Refills: 0 | OUTPATIENT
Start: 2022-07-29

## 2022-08-02 DIAGNOSIS — K59.09 CHRONIC CONSTIPATION: ICD-10-CM

## 2022-08-02 DIAGNOSIS — F39 MOOD DISORDER (HCC): ICD-10-CM

## 2022-08-02 DIAGNOSIS — M62.838 MUSCLE SPASM: ICD-10-CM

## 2022-08-02 DIAGNOSIS — M25.50 ARTHRALGIA, UNSPECIFIED JOINT: ICD-10-CM

## 2022-08-02 DIAGNOSIS — F41.9 ANXIETY: ICD-10-CM

## 2022-08-02 DIAGNOSIS — N94.6 DYSMENORRHEA: ICD-10-CM

## 2022-08-02 DIAGNOSIS — F32.A DEPRESSION, UNSPECIFIED DEPRESSION TYPE: ICD-10-CM

## 2022-08-02 DIAGNOSIS — G90.A POTS (POSTURAL ORTHOSTATIC TACHYCARDIA SYNDROME): ICD-10-CM

## 2022-08-02 DIAGNOSIS — Q79.62 HYPERMOBILE EHLERS-DANLOS SYNDROME: ICD-10-CM

## 2022-08-02 DIAGNOSIS — N92.1 MENORRHAGIA WITH IRREGULAR CYCLE: ICD-10-CM

## 2022-08-03 RX ORDER — BACLOFEN 10 MG/1
TABLET ORAL
Qty: 60 TABLET | Refills: 0 | Status: SHIPPED
Start: 2022-08-03 | End: 2022-08-18 | Stop reason: SDUPTHER

## 2022-08-17 PROBLEM — R07.9 CHEST PAIN AT REST: Status: ACTIVE | Noted: 2019-03-17

## 2022-08-17 PROBLEM — G89.29 CHRONIC BILATERAL LOW BACK PAIN WITHOUT SCIATICA: Status: ACTIVE | Noted: 2022-03-22

## 2022-08-17 PROBLEM — F84.0 AUTISM SPECTRUM: Status: ACTIVE | Noted: 2019-03-15

## 2022-08-17 PROBLEM — K82.8 BILIARY DYSKINESIA: Status: ACTIVE | Noted: 2019-03-15

## 2022-08-17 PROBLEM — G90.A POTS (POSTURAL ORTHOSTATIC TACHYCARDIA SYNDROME): Status: ACTIVE | Noted: 2019-03-15

## 2022-08-17 PROBLEM — M54.50 CHRONIC BILATERAL LOW BACK PAIN WITHOUT SCIATICA: Status: ACTIVE | Noted: 2022-03-22

## 2022-08-17 PROBLEM — R53.1 GENERALIZED WEAKNESS: Status: ACTIVE | Noted: 2019-03-14

## 2022-08-17 PROBLEM — M25.551 RIGHT HIP PAIN: Status: ACTIVE | Noted: 2021-03-10

## 2022-08-17 RX ORDER — CELECOXIB 200 MG/1
CAPSULE ORAL
COMMUNITY
Start: 2022-07-11

## 2022-08-17 RX ORDER — ASPIRIN 81 MG/1
TABLET, COATED ORAL
COMMUNITY
Start: 2022-07-11

## 2022-08-17 RX ORDER — HYDROMORPHONE HYDROCHLORIDE 2 MG/1
TABLET ORAL
COMMUNITY
Start: 2022-07-11 | End: 2022-09-28 | Stop reason: ALTCHOICE

## 2022-08-18 ENCOUNTER — OFFICE VISIT (OUTPATIENT)
Dept: INTERNAL MEDICINE | Age: 22
End: 2022-08-18
Payer: COMMERCIAL

## 2022-08-18 VITALS
DIASTOLIC BLOOD PRESSURE: 72 MMHG | WEIGHT: 127.5 LBS | HEART RATE: 105 BPM | BODY MASS INDEX: 19.32 KG/M2 | TEMPERATURE: 97.7 F | RESPIRATION RATE: 18 BRPM | OXYGEN SATURATION: 97 % | HEIGHT: 68 IN | SYSTOLIC BLOOD PRESSURE: 104 MMHG

## 2022-08-18 DIAGNOSIS — N94.6 DYSMENORRHEA: ICD-10-CM

## 2022-08-18 DIAGNOSIS — N92.1 MENORRHAGIA WITH IRREGULAR CYCLE: ICD-10-CM

## 2022-08-18 DIAGNOSIS — F41.9 ANXIETY: ICD-10-CM

## 2022-08-18 DIAGNOSIS — G90.A POTS (POSTURAL ORTHOSTATIC TACHYCARDIA SYNDROME): ICD-10-CM

## 2022-08-18 DIAGNOSIS — M62.838 MUSCLE SPASM: ICD-10-CM

## 2022-08-18 DIAGNOSIS — Z11.59 NEED FOR HEPATITIS C SCREENING TEST: ICD-10-CM

## 2022-08-18 DIAGNOSIS — Z11.4 ENCOUNTER FOR SCREENING FOR HIV: ICD-10-CM

## 2022-08-18 DIAGNOSIS — Z11.8 SCREENING FOR CHLAMYDIAL DISEASE: Primary | ICD-10-CM

## 2022-08-18 DIAGNOSIS — Q79.62 HYPERMOBILE EHLERS-DANLOS SYNDROME: ICD-10-CM

## 2022-08-18 DIAGNOSIS — N94.10 PAIN IN FEMALE GENITALIA ON INTERCOURSE: ICD-10-CM

## 2022-08-18 DIAGNOSIS — K59.09 CHRONIC CONSTIPATION: ICD-10-CM

## 2022-08-18 DIAGNOSIS — F32.A DEPRESSION, UNSPECIFIED DEPRESSION TYPE: ICD-10-CM

## 2022-08-18 DIAGNOSIS — M25.50 ARTHRALGIA, UNSPECIFIED JOINT: ICD-10-CM

## 2022-08-18 DIAGNOSIS — F39 MOOD DISORDER (HCC): ICD-10-CM

## 2022-08-18 PROCEDURE — 1036F TOBACCO NON-USER: CPT | Performed by: INTERNAL MEDICINE

## 2022-08-18 PROCEDURE — G8427 DOCREV CUR MEDS BY ELIG CLIN: HCPCS | Performed by: INTERNAL MEDICINE

## 2022-08-18 PROCEDURE — G8420 CALC BMI NORM PARAMETERS: HCPCS | Performed by: INTERNAL MEDICINE

## 2022-08-18 PROCEDURE — 90715 TDAP VACCINE 7 YRS/> IM: CPT | Performed by: INTERNAL MEDICINE

## 2022-08-18 PROCEDURE — 90715 TDAP VACCINE 7 YRS/> IM: CPT

## 2022-08-18 PROCEDURE — 6360000002 HC RX W HCPCS

## 2022-08-18 PROCEDURE — 99213 OFFICE O/P EST LOW 20 MIN: CPT | Performed by: INTERNAL MEDICINE

## 2022-08-18 PROCEDURE — 36415 COLL VENOUS BLD VENIPUNCTURE: CPT | Performed by: INTERNAL MEDICINE

## 2022-08-18 PROCEDURE — 90471 IMMUNIZATION ADMIN: CPT

## 2022-08-18 RX ORDER — BACLOFEN 10 MG/1
TABLET ORAL
Qty: 180 TABLET | Refills: 1 | Status: SHIPPED | OUTPATIENT
Start: 2022-08-18

## 2022-08-20 LAB
HEPATITIS C ANTIBODY INTERPRETATION: NORMAL
HIV-1 AND HIV-2 ANTIBODIES: NORMAL

## 2022-08-31 ENCOUNTER — PATIENT MESSAGE (OUTPATIENT)
Dept: INTERNAL MEDICINE | Age: 22
End: 2022-08-31

## 2022-08-31 ENCOUNTER — OFFICE VISIT (OUTPATIENT)
Dept: OBGYN | Age: 22
End: 2022-08-31
Payer: COMMERCIAL

## 2022-08-31 VITALS
HEART RATE: 109 BPM | SYSTOLIC BLOOD PRESSURE: 117 MMHG | DIASTOLIC BLOOD PRESSURE: 79 MMHG | BODY MASS INDEX: 19.25 KG/M2 | WEIGHT: 127 LBS | HEIGHT: 68 IN

## 2022-08-31 DIAGNOSIS — N94.10 DYSPAREUNIA, FEMALE: ICD-10-CM

## 2022-08-31 DIAGNOSIS — N94.819 VULVODYNIA: Primary | ICD-10-CM

## 2022-08-31 DIAGNOSIS — N90.60 LABIA MINORA HYPERTROPHY: ICD-10-CM

## 2022-08-31 DIAGNOSIS — Z12.4 SCREENING FOR CERVICAL CANCER: ICD-10-CM

## 2022-08-31 LAB
CLUE CELLS: NORMAL
SOURCE WET PREP: NORMAL
TRICHOMONAS PREP: NORMAL
YEAST WET PREP: NORMAL

## 2022-08-31 PROCEDURE — 99203 OFFICE O/P NEW LOW 30 MIN: CPT | Performed by: OBSTETRICS & GYNECOLOGY

## 2022-08-31 PROCEDURE — 1036F TOBACCO NON-USER: CPT | Performed by: OBSTETRICS & GYNECOLOGY

## 2022-08-31 PROCEDURE — 99204 OFFICE O/P NEW MOD 45 MIN: CPT | Performed by: OBSTETRICS & GYNECOLOGY

## 2022-08-31 PROCEDURE — G8420 CALC BMI NORM PARAMETERS: HCPCS | Performed by: OBSTETRICS & GYNECOLOGY

## 2022-08-31 PROCEDURE — G8427 DOCREV CUR MEDS BY ELIG CLIN: HCPCS | Performed by: OBSTETRICS & GYNECOLOGY

## 2022-08-31 RX ORDER — ESTRADIOL 0.1 MG/G
0.5 CREAM VAGINAL SEE ADMIN INSTRUCTIONS
Qty: 45 G | Refills: 0 | Status: SHIPPED
Start: 2022-08-31 | End: 2022-10-18 | Stop reason: SDUPTHER

## 2022-08-31 RX ORDER — METHYLPREDNISOLONE 4 MG/1
TABLET ORAL
COMMUNITY
Start: 2022-08-30 | End: 2022-09-12 | Stop reason: ALTCHOICE

## 2022-08-31 NOTE — PROGRESS NOTES
HISTORY OF PRESENT ILLNESS:    24 y.o. adult   transgender male presents with complaint of dyspareunia. Currently sexually active with . They state that the labia gets \"dragged\" in during intercourse and that they sometimes get vulvar abrasions with burning. Denies having any currently, but they did show a picture of an abrasion inside of the left labia minora. Pt also c/o discomfort with cycling and is wondering if they should have labiaplasty. Reports some irritation with lubricant as well. Does not feel they are dry. Denies any pain with deep penetration. Currently has a kyleena for contraception but would like to conceive in the future. The patient has had the Gardasil vaccine. Past Medical History:   Past Medical History:   Diagnosis Date    Anxiety     Autism spectrum disorder 2019    Bipolar disorder ([facility] [address]     Depression     Dysmenorrhea     Christen-Danlos syndrome type III 2019    GERD (gastroesophageal reflux disease)     Headache     Menorrhagia     Postural orthostatic tachycardia syndrome 2014    Syringomyelia ([facility] [address] 2019    Urinary incontinence                                              OB History    Para Term  AB Living   0 0 0 0 0 0   SAB IAB Ectopic Molar Multiple Live Births   0 0 0 0 0 0         Past Surgical History:   Past Surgical History:   Procedure Laterality Date    CHOLECYSTECTOMY      HIP SURGERY      WISDOM TOOTH EXTRACTION  2019        Allergies: Reglan [metoclopramide], Abilify [aripiprazole], and Aloe vera     Medications:   Current Outpatient Medications   Medication Sig Dispense Refill    estradiol (ESTRACE VAGINAL) 0.1 MG/GM vaginal cream Place 0.5 g vaginally See Admin Instructions Apply 0.5 g topically to vulva each night.  45 g 0    baclofen (LIORESAL) 10 MG tablet take 1 tablet by mouth twice a day 180 tablet 1    celecoxib (CELEBREX) 200 MG capsule Takes as needed      Ubrogepant (UBRELVY) 50 MG TABS Take 50 mg by mouth daily as needed (Migraine) May repeat dose after 2 hours if headache is still present. Do not exceed 2 tablets in 24 hours. 30 tablet 0    lidocaine (XYLOCAINE) 5 % ointment Apply topically as needed. 30 g 1    hydrocortisone (ANUSOL-HC) 25 MG suppository Place 1 suppository rectally 2 times daily as needed for Hemorrhoids 12 suppository 1    gabapentin (NEURONTIN) 300 MG capsule Take 1 capsule by mouth 2 times daily for 120 days. 60 capsule 3    busPIRone (BUSPAR) 10 MG tablet take 1 tablet by mouth twice a day 60 tablet 5    lamoTRIgine (LAMICTAL) 100 MG tablet take 1 tablet by mouth once daily (Patient taking differently: Take 100 mg by mouth daily Indications: Mood Disorder take 1 tablet by mouth once daily) 30 tablet 5    DULoxetine (CYMBALTA) 30 MG extended release capsule take 1 capsule by mouth twice a day (Patient taking differently: Indications: Depression take 1 capsule by mouth twice a day) 180 capsule 5    fludrocortisone (FLORINEF) 0.1 MG tablet take 1 tablet by mouth once daily (Patient taking differently: Take 0.1 mg by mouth daily Indications: Postural Orthostatic Tachycardia take 1 tablet by mouth once daily) 90 tablet 3    famotidine (PEPCID) 20 MG tablet Take 20 mg by mouth 2 times daily Taking as needed before ibuprofen      Levonorgestrel [facility]) IUD 19.5 mg 19.5 each by IntraUTERine route Dec 2020 inserted, remove 5 years  Manage endometriosis, not confirmed      Cholecalciferol 50 MCG (2000 UT) TABS Take 2,000 Units by mouth nightly      loratadine (CLARITIN) 10 MG capsule Take 10 mg by mouth daily Indications:  Allergic Rhinitis       methylPREDNISolone (MEDROL DOSEPACK) 4 MG tablet       ASPIRIN LOW DOSE 81 MG EC tablet TAKE ONE TABLET BY MOUTH TWO TIMES A DAY FOR 3 WEEKS (Patient not taking: No sig reported)      HYDROmorphone (DILAUDID) 2 MG tablet TAKE 1 TABLET BY MOUTH EVERY 4 HOURS AS NEEDED FOR PAIN (Patient not taking: No sig reported)      traMADol (ULTRAM) 50 MG tablet Take 50 mg by mouth every 6 hours as needed. Indications: Pain  (Patient not taking: No sig reported)       No current facility-administered medications for this visit. Social History:   Social History     Tobacco Use    Smoking status: Never    Smokeless tobacco: Never   Substance Use Topics    Alcohol use: Never        Family History:   Family History   Problem Relation Age of Onset    Heart Disease Mother     Other Mother         Spinocerebellar ataxia    Mental Illness Mother     Miscarriages / Stillbirths Mother     Depression Mother     High Cholesterol Mother     Mental Illness Father     Depression Father     Heart Disease Sister     Mental Illness Brother     Learning Disabilities Brother     Dementia Maternal Grandmother     Heart Attack Maternal Grandfather     Heart Disease Maternal Grandfather     High Cholesterol Maternal Grandfather     Alcohol Abuse Maternal Grandfather     Heart Attack Paternal Grandfather     Heart Disease Paternal Grandfather     Heart Attack Maternal Uncle     Heart Disease Maternal Uncle     Mental Illness Paternal Aunt     Substance Abuse Paternal Aunt     Mental Illness Paternal Uncle     Substance Abuse Paternal Uncle     Alcohol Abuse Paternal Uncle     Substance Abuse Paternal Cousin        REVIEW OF SYSTEMS:    Constitutional: negative  HEENT: negative  Breast: negative  Respiratory: negative  Cardiovascular: negative  Gastrointestinal: negative  Genitourinary: As per HPI  Integument: negative  Neurological: negative  Endocrine: negative          PHYSICAL EXAM  /79 (Position: Sitting)   Pulse (!) 109   Ht 5' 8\" (1.727 m)   Wt 127 lb (57.6 kg)   LMP 08/05/2022   BMI 19.31 kg/m²   Patient's last menstrual period was 08/05/2022. General appearance: alert, cooperative and in no acute distress.   Head: NCAT   Psych: No acute distress, mood and affect full range  Neuro: Alert and oriented, no focal deficits     Pelvic Exam:   EXTERNAL GENITALIA: normal external genitalia, no external lesions, Q tip used to palpate external genitalia with some tenderness noted at the introitus  VAGINA: normal rugae, no discharge   CERVIX: normal appearing, no lesions, no bleeding  UTERUS: uterus is normal size, shape, consistency and nontender   ADNEXA: normal adnexa in size, nontender and no masses. ASSESSMENT :      Diagnosis Orders   1. Vulvodynia  estradiol (ESTRACE VAGINAL) 0.1 MG/GM vaginal cream      2. Dyspareunia, female  Wet prep, genital    estradiol (ESTRACE VAGINAL) 0.1 MG/GM vaginal cream      3. Screening for cervical cancer  PAP SMEAR      4. Labia minora hypertrophy  NAVDEEP - Osman Sanchez MD, Plastic & Reconstructive Surgery, [place]           PLAN:  Discussed good vulvar hygiene and hypoallergenic lubricants. Pt also to practice most comfortable positions during intercourse and make sure labia is \"cleared\" before penetration. Will trial estrace cream for vulvodynia and refer to plastics for possible labiaplasty. Pt agreeable to plan. Return in about 6 weeks (around 10/12/2022) for Medication check. Orders Placed This Encounter   Medications    estradiol (ESTRACE VAGINAL) 0.1 MG/GM vaginal cream     Sig: Place 0.5 g vaginally See Admin Instructions Apply 0.5 g topically to vulva each night. Dispense:  45 g     Refill:  0       Orders Placed This Encounter   Procedures    Wet prep, genital    PAP SMEAR     Order Specific Question:   Collection Type     Answer: Thin Prep     Order Specific Question:   Prior Abnormal Pap Test     Answer:   No     Order Specific Question:   Screening or Diagnostic     Answer:   Screening     Order Specific Question:   Additional STD Testing     Answer:   GC and Chlamydia     Order Specific Question:   Diagnosis Code for Additional STD Testing     Answer:   Screening for Chlamydial disease (Z11.8)     Order Specific Question:   HPV Requested?      Answer:   HPV if ASCUS     Order Specific Question:   High Risk Patient     Answer:   N/A    NAVDEEP Arturo Perez MD, Plastic & Reconstructive Surgery, [place]     Referral Priority:   Routine     Referral Type:   Eval and Treat     Referral Reason:   Specialty Services Required     Referred to Provider:   Bell Subramanian MD     Requested Specialty:   Plastic Surgery     Number of Visits Requested:   1         Electronically signed by Cipriano Varela DO on 8/31/22

## 2022-09-06 LAB
CHLAMYDIA BY THIN PREP: NEGATIVE
N. GONORRHOEAE DNA, THIN PREP: NEGATIVE
SOURCE: NORMAL

## 2022-09-07 NOTE — PROGRESS NOTES
[facility] Internal Medicine      SUBJECTIVE:  Eliz Gurrola (:  2000) is a 24 y.o. adult here for evaluation of the following chief complaint(s): Insomnia  Patient with ongoing insomnia. Patient has tried sleep hygiene practices as well as melatonin which has not worked for her and gives them night sweats. Patient on multiple medications which interact with medications for sleep. Has tried Ambien and they were still awakening with this medication as well. Recommend referral to sleep medicine. Julián Monson is in agreement with this plan of care. Referral placed to sleep medicine. Saw GYN on 2022. Was prescribed estrogen cream for healthy. Referral was placed to Dr. Barb Jane off bed on 2022. Had ortho appointment after this fall. Had hip pain after this. Was given a prescription for prednisone. If still with pain - was to get a steroid injection along with celecoxib. Will refer to Dr. Eleanor Leger for this injection. Marie to follow with a telemedicine visit with ortho near month's end. Review of Systems    Current Outpatient Medications on File Prior to Visit   Medication Sig Dispense Refill    methylPREDNISolone (MEDROL DOSEPACK) 4 MG tablet       estradiol (ESTRACE VAGINAL) 0.1 MG/GM vaginal cream Place 0.5 g vaginally See Admin Instructions Apply 0.5 g topically to vulva each night. 45 g 0    baclofen (LIORESAL) 10 MG tablet take 1 tablet by mouth twice a day 180 tablet 1    ASPIRIN LOW DOSE 81 MG EC tablet TAKE ONE TABLET BY MOUTH TWO TIMES A DAY FOR 3 WEEKS      celecoxib (CELEBREX) 200 MG capsule Takes as needed      HYDROmorphone (DILAUDID) 2 MG tablet TAKE 1 TABLET BY MOUTH EVERY 4 HOURS AS NEEDED FOR PAIN      Ubrogepant (UBRELVY) 50 MG TABS Take 50 mg by mouth daily as needed (Migraine) May repeat dose after 2 hours if headache is still present. Do not exceed 2 tablets in 24 hours.  30 tablet 0    lidocaine (XYLOCAINE) 5 % ointment Apply topically as needed. 30 g 1    hydrocortisone (ANUSOL-HC) 25 MG suppository Place 1 suppository rectally 2 times daily as needed for Hemorrhoids 12 suppository 1    traMADol (ULTRAM) 50 MG tablet Take 50 mg by mouth every 6 hours as needed. Indications: Pain      busPIRone (BUSPAR) 10 MG tablet take 1 tablet by mouth twice a day 60 tablet 5    lamoTRIgine (LAMICTAL) 100 MG tablet take 1 tablet by mouth once daily (Patient taking differently: Take 100 mg by mouth daily Indications: Mood Disorder take 1 tablet by mouth once daily) 30 tablet 5    DULoxetine (CYMBALTA) 30 MG extended release capsule take 1 capsule by mouth twice a day (Patient taking differently: Indications: Depression take 1 capsule by mouth twice a day) 180 capsule 5    fludrocortisone (FLORINEF) 0.1 MG tablet take 1 tablet by mouth once daily (Patient taking differently: Take 0.1 mg by mouth daily Indications: Postural Orthostatic Tachycardia take 1 tablet by mouth once daily) 90 tablet 3    famotidine (PEPCID) 20 MG tablet Take 20 mg by mouth 2 times daily Taking as needed before ibuprofen      Levonorgestrel [facility]) IUD 19.5 mg 19.5 each by IntraUTERine route Dec 2020 inserted, remove 5 years  Manage endometriosis, not confirmed      Cholecalciferol 50 MCG (2000 UT) TABS Take 2,000 Units by mouth nightly      loratadine (CLARITIN) 10 MG capsule Take 10 mg by mouth daily Indications: Allergic Rhinitis       gabapentin (NEURONTIN) 300 MG capsule Take 1 capsule by mouth 2 times daily for 120 days. 60 capsule 3     No current facility-administered medications on file prior to visit. OBJECTIVE:    VS:   Vitals:    09/08/22 0905   BP: 126/77   Site: Right Upper Arm   Position: Sitting   Cuff Size: Medium Adult   Pulse: (!) 105   Resp: 18   Temp: 97.3 °F (36.3 °C)   TempSrc: Temporal   SpO2: 98%   Weight: 129 lb (58.5 kg)     Physical Exam   Lungs:  CTA B  CVS:  +s1/s2 without m/g/r appreciated.     Abd:  + BS, NTND, No renal or aortic bruits   Extr:  2+ DP/PT pulses B, no pitting edema    RTC:  As previously scheduled.        Frannie Barakat MD   9/8/2022 9:13 AM

## 2022-09-08 ENCOUNTER — OFFICE VISIT (OUTPATIENT)
Dept: INTERNAL MEDICINE | Age: 22
End: 2022-09-08
Payer: COMMERCIAL

## 2022-09-08 VITALS
DIASTOLIC BLOOD PRESSURE: 77 MMHG | OXYGEN SATURATION: 98 % | TEMPERATURE: 97.3 F | WEIGHT: 129 LBS | BODY MASS INDEX: 19.61 KG/M2 | SYSTOLIC BLOOD PRESSURE: 126 MMHG | RESPIRATION RATE: 18 BRPM | HEART RATE: 105 BPM

## 2022-09-08 DIAGNOSIS — G47.00 INSOMNIA, UNSPECIFIED TYPE: Primary | ICD-10-CM

## 2022-09-08 DIAGNOSIS — M25.552 HIP PAIN, ACUTE, LEFT: ICD-10-CM

## 2022-09-08 PROBLEM — R07.9 CHEST PAIN AT REST: Status: RESOLVED | Noted: 2019-03-17 | Resolved: 2022-09-08

## 2022-09-08 PROCEDURE — 99213 OFFICE O/P EST LOW 20 MIN: CPT | Performed by: INTERNAL MEDICINE

## 2022-09-08 PROCEDURE — G8427 DOCREV CUR MEDS BY ELIG CLIN: HCPCS | Performed by: INTERNAL MEDICINE

## 2022-09-08 PROCEDURE — G8420 CALC BMI NORM PARAMETERS: HCPCS | Performed by: INTERNAL MEDICINE

## 2022-09-08 PROCEDURE — 1036F TOBACCO NON-USER: CPT | Performed by: INTERNAL MEDICINE

## 2022-09-08 SDOH — ECONOMIC STABILITY: HOUSING INSECURITY
IN THE LAST 12 MONTHS, WAS THERE A TIME WHEN YOU DID NOT HAVE A STEADY PLACE TO SLEEP OR SLEPT IN A SHELTER (INCLUDING NOW)?: NO

## 2022-09-08 SDOH — ECONOMIC STABILITY: TRANSPORTATION INSECURITY
IN THE PAST 12 MONTHS, HAS LACK OF TRANSPORTATION KEPT YOU FROM MEETINGS, WORK, OR FROM GETTING THINGS NEEDED FOR DAILY LIVING?: NO

## 2022-09-08 SDOH — ECONOMIC STABILITY: HOUSING INSECURITY: IN THE LAST 12 MONTHS, HOW MANY PLACES HAVE YOU LIVED?: 1

## 2022-09-08 SDOH — ECONOMIC STABILITY: TRANSPORTATION INSECURITY
IN THE PAST 12 MONTHS, HAS THE LACK OF TRANSPORTATION KEPT YOU FROM MEDICAL APPOINTMENTS OR FROM GETTING MEDICATIONS?: NO

## 2022-09-08 SDOH — ECONOMIC STABILITY: FOOD INSECURITY: WITHIN THE PAST 12 MONTHS, YOU WORRIED THAT YOUR FOOD WOULD RUN OUT BEFORE YOU GOT MONEY TO BUY MORE.: NEVER TRUE

## 2022-09-08 SDOH — ECONOMIC STABILITY: INCOME INSECURITY: IN THE LAST 12 MONTHS, WAS THERE A TIME WHEN YOU WERE NOT ABLE TO PAY THE MORTGAGE OR RENT ON TIME?: NO

## 2022-09-08 SDOH — ECONOMIC STABILITY: FOOD INSECURITY: WITHIN THE PAST 12 MONTHS, THE FOOD YOU BOUGHT JUST DIDN'T LAST AND YOU DIDN'T HAVE MONEY TO GET MORE.: NEVER TRUE

## 2022-09-08 ASSESSMENT — LIFESTYLE VARIABLES
HOW MANY STANDARD DRINKS CONTAINING ALCOHOL DO YOU HAVE ON A TYPICAL DAY: PATIENT DOES NOT DRINK
HOW OFTEN DO YOU HAVE A DRINK CONTAINING ALCOHOL: NEVER

## 2022-09-08 ASSESSMENT — SOCIAL DETERMINANTS OF HEALTH (SDOH): HOW HARD IS IT FOR YOU TO PAY FOR THE VERY BASICS LIKE FOOD, HOUSING, MEDICAL CARE, AND HEATING?: NOT HARD AT ALL

## 2022-09-08 ASSESSMENT — PATIENT HEALTH QUESTIONNAIRE - PHQ9
2. FEELING DOWN, DEPRESSED OR HOPELESS: NOT AT ALL
SUM OF ALL RESPONSES TO PHQ9 QUESTIONS 1 & 2: 0
DEPRESSION UNABLE TO ASSESS: YES
1. LITTLE INTEREST OR PLEASURE IN DOING THINGS: NOT AT ALL

## 2022-09-09 DIAGNOSIS — M54.32 SCIATICA OF LEFT SIDE: ICD-10-CM

## 2022-09-12 ENCOUNTER — OFFICE VISIT (OUTPATIENT)
Dept: ORTHOPEDIC SURGERY | Age: 22
End: 2022-09-12
Payer: COMMERCIAL

## 2022-09-12 VITALS — HEIGHT: 68 IN | BODY MASS INDEX: 19.55 KG/M2 | WEIGHT: 129 LBS

## 2022-09-12 DIAGNOSIS — M25.552 LEFT HIP PAIN: Primary | ICD-10-CM

## 2022-09-12 PROCEDURE — 99999 PR OFFICE/OUTPT VISIT,PROCEDURE ONLY: CPT | Performed by: FAMILY MEDICINE

## 2022-09-12 PROCEDURE — 20611 DRAIN/INJ JOINT/BURSA W/US: CPT | Performed by: FAMILY MEDICINE

## 2022-09-12 RX ORDER — GABAPENTIN 300 MG/1
CAPSULE ORAL
Qty: 180 CAPSULE | Refills: 0 | Status: SHIPPED | OUTPATIENT
Start: 2022-09-12 | End: 2022-12-11

## 2022-09-12 RX ORDER — TRIAMCINOLONE ACETONIDE 40 MG/ML
80 INJECTION, SUSPENSION INTRA-ARTICULAR; INTRAMUSCULAR ONCE
Status: COMPLETED | OUTPATIENT
Start: 2022-09-12 | End: 2022-09-12

## 2022-09-12 RX ORDER — LIDOCAINE HYDROCHLORIDE 10 MG/ML
5 INJECTION, SOLUTION INFILTRATION; PERINEURAL ONCE
Status: COMPLETED | OUTPATIENT
Start: 2022-09-12 | End: 2022-09-12

## 2022-09-12 RX ADMIN — TRIAMCINOLONE ACETONIDE 80 MG: 40 INJECTION, SUSPENSION INTRA-ARTICULAR; INTRAMUSCULAR at 11:50

## 2022-09-12 RX ADMIN — LIDOCAINE HYDROCHLORIDE 5 ML: 10 INJECTION, SOLUTION INFILTRATION; PERINEURAL at 11:50

## 2022-09-12 NOTE — PROGRESS NOTES
PROCEDURE NOTE:    DIAGNOSIS      LEFT hip pain    PROCEDURE     Ultrasound-guided LEFT femoroacetabular?(hip) joint corticosteroid injection. PROCEDURAL PAUSE     Procedural pause conducted to verify: ?correct patient identity, procedure to be performed, and as applicable, correct side and site, correct patient position, and availability of implants, special equipment, or special requirements. PROCEDURE DETAILS     The procedure was carried out under sterile technique. Patient Position: ? Supine. Localization Process: ? The hip joint was evaluated under ultrasound prior to starting the procedure. ? The neurovascular bundle (femoral nerve, artery, vein) was identified under ultrasound prior to starting the procedure. ? The skin was prepped with Betadine and Alcohol. Approach: ? In-plane. Local Anesthesia: Under live ultrasound guidance, local anesthesia was obtained with vapocoolant cold spray and 2 cc of 1% lidocaine using a 25-gauge 2-inch needle advanced from an in-plane approach to the hip joint capsule at the femoral head-neck junction. ?     Injection/Aspiration: ?A 22-gauge 3-1/2-inch needle was advanced from an in-plane approach into the hip joint. ?Os was contacted at the femoral head-neck junction. ? After visualization of the needle tip in the target area and negative aspiration for blood, a mixture of 3 cc of 1% lidocaine and 2 cc of Kenalog (40 mg/cc) was injected into the hip joint with excellent sonographic flow. Images of procedure were permanently recorded. Postprocedure Care: ? The patient will avoid soaking the hip under water for two days and avoid heavy exertion with the hip. ?The patient will contact me with any problems related to the injection. PATIENT EDUCATION     Ready to learn, no apparent learning barriers were identified; learning preferences include listening. ? Explained diagnosis and treatment plan; patient expressed understanding of the content.      INFORMED CONSENT     Following denial of allergy and review of potential side effects and complications including but not necessarily limited to infection, allergic reaction, local tissue breakdown, systemic effects of corticosteroids, elevation of blood glucose, injury to soft tissue and/or nerves, and seizure, the patient indicated their understanding and agreed to proceed. ? Discussed the risks, benefits, alternatives, and the necessity of other members of the healthcare team participating in the procedure. ?All questions answered and consent given. All questions and concerns were addressed and consent was verbalized by the patient. FOLLOW UP    Follow up in 6-8 weeks.     Electronically signed by Genia Luther MD on 9/12/2022 at 10:40 AM

## 2022-09-28 ENCOUNTER — OFFICE VISIT (OUTPATIENT)
Dept: INTERNAL MEDICINE | Age: 22
End: 2022-09-28
Payer: COMMERCIAL

## 2022-09-28 VITALS
HEIGHT: 68 IN | OXYGEN SATURATION: 99 % | TEMPERATURE: 97.2 F | RESPIRATION RATE: 18 BRPM | DIASTOLIC BLOOD PRESSURE: 70 MMHG | SYSTOLIC BLOOD PRESSURE: 104 MMHG | WEIGHT: 127 LBS | HEART RATE: 99 BPM | BODY MASS INDEX: 19.25 KG/M2

## 2022-09-28 DIAGNOSIS — F41.9 ANXIETY: ICD-10-CM

## 2022-09-28 DIAGNOSIS — G43.709 CHRONIC MIGRAINE WITHOUT AURA WITHOUT STATUS MIGRAINOSUS, NOT INTRACTABLE: Primary | ICD-10-CM

## 2022-09-28 DIAGNOSIS — N80.9 ENDOMETRIOSIS: ICD-10-CM

## 2022-09-28 DIAGNOSIS — Q79.60 EHLERS-DANLOS SYNDROME: ICD-10-CM

## 2022-09-28 DIAGNOSIS — G90.A POTS (POSTURAL ORTHOSTATIC TACHYCARDIA SYNDROME): ICD-10-CM

## 2022-09-28 DIAGNOSIS — G95.0 SYRINGOMYELIA (HCC): ICD-10-CM

## 2022-09-28 DIAGNOSIS — M25.50 ARTHRALGIA, UNSPECIFIED JOINT: ICD-10-CM

## 2022-09-28 DIAGNOSIS — F32.A DEPRESSION, UNSPECIFIED DEPRESSION TYPE: ICD-10-CM

## 2022-09-28 DIAGNOSIS — N94.6 DYSMENORRHEA: ICD-10-CM

## 2022-09-28 DIAGNOSIS — N92.1 MENORRHAGIA WITH IRREGULAR CYCLE: ICD-10-CM

## 2022-09-28 DIAGNOSIS — F48.9 MOOD PROBLEM: ICD-10-CM

## 2022-09-28 DIAGNOSIS — F39 MOOD DISORDER (HCC): ICD-10-CM

## 2022-09-28 DIAGNOSIS — K59.09 CHRONIC CONSTIPATION: ICD-10-CM

## 2022-09-28 DIAGNOSIS — Q79.62 HYPERMOBILE EHLERS-DANLOS SYNDROME: ICD-10-CM

## 2022-09-28 DIAGNOSIS — M62.838 MUSCLE SPASM: ICD-10-CM

## 2022-09-28 PROCEDURE — 1036F TOBACCO NON-USER: CPT | Performed by: INTERNAL MEDICINE

## 2022-09-28 PROCEDURE — 99213 OFFICE O/P EST LOW 20 MIN: CPT | Performed by: INTERNAL MEDICINE

## 2022-09-28 PROCEDURE — G8420 CALC BMI NORM PARAMETERS: HCPCS | Performed by: INTERNAL MEDICINE

## 2022-09-28 PROCEDURE — G8427 DOCREV CUR MEDS BY ELIG CLIN: HCPCS | Performed by: INTERNAL MEDICINE

## 2022-09-28 RX ORDER — LAMOTRIGINE 100 MG/1
100 TABLET ORAL DAILY
Qty: 90 TABLET | Refills: 1 | Status: SHIPPED | OUTPATIENT
Start: 2022-09-28

## 2022-09-28 RX ORDER — DULOXETIN HYDROCHLORIDE 30 MG/1
CAPSULE, DELAYED RELEASE ORAL
Qty: 180 CAPSULE | Refills: 5 | Status: SHIPPED | OUTPATIENT
Start: 2022-09-28

## 2022-09-28 RX ORDER — BUSPIRONE HYDROCHLORIDE 10 MG/1
TABLET ORAL
Qty: 60 TABLET | Refills: 5 | Status: SHIPPED | OUTPATIENT
Start: 2022-09-28

## 2022-09-28 NOTE — PROGRESS NOTES
[facility] Internal Medicine      SUBJECTIVE:  Rowan Salgado (:  2000) is a 25 y.o. adult here for evaluation of the following chief complaint(s):  Follow-up  Sleep issues - a referral was placed at last visit. Appointment not until 2022. Still with insomnia. Using Benadryl three times per week. Trouble falling asleep. In the past was on Ambien but did not find that they were able to sleep through the night. Will discuss with sleep medicine possible medication choices prior to the consultation in November. Saw Dr. Gross First on 2022 for hip injection. Marie is to follow-up in 6-8 weeks. Is due to see Dr. Ignacio Foreman in [place] for further evaluation as the injection did not provide much relief. Marie is waiting for this office to contact them. Migraine - ongoing use of Ubrelvy 2 times per week. Interested in prophylactic therapy which is difficult given other medical issues. Referral to neurology. Reports excess mucous in bowel movements. No changes in the diet. +hemorrhoids. Follow for further symptoms   May need further GI evaluation. Review of Systems    Current Outpatient Medications on File Prior to Visit   Medication Sig Dispense Refill    gabapentin (NEURONTIN) 300 MG capsule take 1 capsule by mouth twice a day 180 capsule 0    estradiol (ESTRACE VAGINAL) 0.1 MG/GM vaginal cream Place 0.5 g vaginally See Admin Instructions Apply 0.5 g topically to vulva each night. 45 g 0    baclofen (LIORESAL) 10 MG tablet take 1 tablet by mouth twice a day 180 tablet 1    celecoxib (CELEBREX) 200 MG capsule Takes as needed      Ubrogepant (UBRELVY) 50 MG TABS Take 50 mg by mouth daily as needed (Migraine) May repeat dose after 2 hours if headache is still present. Do not exceed 2 tablets in 24 hours. 30 tablet 0    lidocaine (XYLOCAINE) 5 % ointment Apply topically as needed.  30 g 1    hydrocortisone (ANUSOL-HC) 25 MG suppository Place 1 suppository rectally 2 times daily as needed for Hemorrhoids 12 suppository 1    fludrocortisone (FLORINEF) 0.1 MG tablet take 1 tablet by mouth once daily (Patient taking differently: Take 0.1 mg by mouth daily Indications: Postural Orthostatic Tachycardia take 1 tablet by mouth once daily) 90 tablet 3    famotidine (PEPCID) 20 MG tablet Take 20 mg by mouth 2 times daily Taking as needed before ibuprofen      Levonorgestrel [facility] [place]) IUD 19.5 mg 19.5 each by IntraUTERine route Dec 2020 inserted, remove 5 years  Manage endometriosis, not confirmed      Cholecalciferol 50 MCG (2000 UT) TABS Take 2,000 Units by mouth nightly      loratadine (CLARITIN) 10 MG capsule Take 10 mg by mouth daily Indications: Allergic Rhinitis       ASPIRIN LOW DOSE 81 MG EC tablet TAKE ONE TABLET BY MOUTH TWO TIMES A DAY FOR 3 WEEKS (Patient not taking: Reported on 9/28/2022)      traMADol (ULTRAM) 50 MG tablet Take 50 mg by mouth every 6 hours as needed. Indications: Pain (Patient not taking: Reported on 9/28/2022)       No current facility-administered medications on file prior to visit. OBJECTIVE:    VS:   Vitals:    09/28/22 0909   BP: 104/70   Site: Left Upper Arm   Position: Sitting   Cuff Size: Medium Adult   Pulse: 99   Resp: 18   Temp: 97.2 °F (36.2 °C)   TempSrc: Temporal   SpO2: 99%   Weight: 127 lb (57.6 kg)   Height: 5' 8\" (1.727 m)     Physical Exam   Lungs:  CTA B  Neck:   No carotid bruits appreciated B.   CVS:  +s1/s2 without m/g/r appreciated. Abd:  + BS, NTND, No renal or aortic bruits   Extr:  2+ DP/PT pulses B, no pitting edema      RTC:  Return in about 8 weeks (around 11/23/2022).       Edgar Lopes MD   9/28/2022 9:38 AM

## 2022-10-10 DIAGNOSIS — K59.09 CHRONIC CONSTIPATION: ICD-10-CM

## 2022-10-10 DIAGNOSIS — G90.A POTS (POSTURAL ORTHOSTATIC TACHYCARDIA SYNDROME): ICD-10-CM

## 2022-10-10 DIAGNOSIS — M62.838 MUSCLE SPASM: ICD-10-CM

## 2022-10-10 DIAGNOSIS — F39 MOOD DISORDER (HCC): ICD-10-CM

## 2022-10-10 DIAGNOSIS — M25.50 ARTHRALGIA, UNSPECIFIED JOINT: ICD-10-CM

## 2022-10-10 DIAGNOSIS — N94.6 DYSMENORRHEA: ICD-10-CM

## 2022-10-10 DIAGNOSIS — F32.A DEPRESSION, UNSPECIFIED DEPRESSION TYPE: ICD-10-CM

## 2022-10-10 DIAGNOSIS — F41.9 ANXIETY: ICD-10-CM

## 2022-10-10 DIAGNOSIS — Q79.62 HYPERMOBILE EHLERS-DANLOS SYNDROME: ICD-10-CM

## 2022-10-10 DIAGNOSIS — N92.1 MENORRHAGIA WITH IRREGULAR CYCLE: ICD-10-CM

## 2022-10-11 RX ORDER — BACLOFEN 10 MG/1
TABLET ORAL
Qty: 180 TABLET | Refills: 1 | OUTPATIENT
Start: 2022-10-11

## 2022-10-12 ENCOUNTER — PATIENT MESSAGE (OUTPATIENT)
Dept: INTERNAL MEDICINE | Age: 22
End: 2022-10-12

## 2022-10-18 ENCOUNTER — PATIENT MESSAGE (OUTPATIENT)
Dept: INTERNAL MEDICINE | Age: 22
End: 2022-10-18

## 2022-10-18 ENCOUNTER — OFFICE VISIT (OUTPATIENT)
Dept: OBGYN | Age: 22
End: 2022-10-18
Payer: COMMERCIAL

## 2022-10-18 VITALS
BODY MASS INDEX: 19.16 KG/M2 | WEIGHT: 126 LBS | HEART RATE: 107 BPM | SYSTOLIC BLOOD PRESSURE: 134 MMHG | DIASTOLIC BLOOD PRESSURE: 70 MMHG

## 2022-10-18 DIAGNOSIS — N94.819 VULVODYNIA: ICD-10-CM

## 2022-10-18 DIAGNOSIS — N94.6 DYSMENORRHEA: ICD-10-CM

## 2022-10-18 DIAGNOSIS — N94.10 DYSPAREUNIA, FEMALE: Primary | ICD-10-CM

## 2022-10-18 PROCEDURE — G8420 CALC BMI NORM PARAMETERS: HCPCS | Performed by: OBSTETRICS & GYNECOLOGY

## 2022-10-18 PROCEDURE — 99212 OFFICE O/P EST SF 10 MIN: CPT | Performed by: OBSTETRICS & GYNECOLOGY

## 2022-10-18 PROCEDURE — G8482 FLU IMMUNIZE ORDER/ADMIN: HCPCS | Performed by: OBSTETRICS & GYNECOLOGY

## 2022-10-18 PROCEDURE — G8427 DOCREV CUR MEDS BY ELIG CLIN: HCPCS | Performed by: OBSTETRICS & GYNECOLOGY

## 2022-10-18 PROCEDURE — 99213 OFFICE O/P EST LOW 20 MIN: CPT | Performed by: OBSTETRICS & GYNECOLOGY

## 2022-10-18 PROCEDURE — 1036F TOBACCO NON-USER: CPT | Performed by: OBSTETRICS & GYNECOLOGY

## 2022-10-18 RX ORDER — ESTRADIOL 0.1 MG/G
0.5 CREAM VAGINAL SEE ADMIN INSTRUCTIONS
Qty: 45 G | Refills: 0 | Status: SHIPPED | OUTPATIENT
Start: 2022-10-18

## 2022-10-18 NOTE — PROGRESS NOTES
Spoke with Dr. Michoacano Swift. Patients with Christen-Danlos prone to a multitude of sleep disorders. Therefore, additional testing is warranted. Ideally, REM impacting medications are limited prior to this testing. Nguyen Rocha is already on some of these medications but no plan to currently stop these. However, no additional medications are recommended until after further testing for underlying sleep disorders. I spoke with Nguyen Rocha and they understand the rationale. They will keep a sleep diary about hours slept, time of sleep, any awakenings, etc.  Marie voiced understanding.       Nidia Kent MD  10/18/2022

## 2022-10-18 NOTE — PROGRESS NOTES
Patient here for medication check. Patient states estrace cream has helped some but still having issues. Discharge instructions have been discussed with the patient by Dr. Saravanan Camarena and patient voiced understanding of plan of care. Patient advised to call our office with any questions or concerns.

## 2022-10-18 NOTE — PROGRESS NOTES
HISTORY OF PRESENT ILLNESS:      24 y.o. adult  Bishop Mack transgender male presents for f/u for dyspareunia. Currently sexually active with . They state that the labia gets \"dragged\" in during intercourse and that they sometimes get vulvar abrasions with burning. Denies having any currently, but they did show a picture of an abrasion inside of the left labia minora. Pt also c/o discomfort with cycling and is wondering if they should have labiaplasty. Reports some irritation with lubricant as well. Does not feel they are dry. Denies any pain with deep penetration. Currently has a kyleena for contraception but would like to conceive in the future. The patient has had the Gardasil vaccine. Pt was treated with estrogen cream. She states that it helps with her abrasions but her labia is still being pulled in during sex. She plans to make an appointment with plastics. Pt also c/o cramping before her period, approximately 8 days before her period. This has worsened over the last year. She was told in the past that she likely has endometriosis. She has never had a laparoscopy. She states her cramping is approximately 2 weeks out of the month. She currently has a kyleena for cycle control.      Past Medical History:   Past Medical History:   Diagnosis Date    Anxiety     Autism spectrum disorder 2019    Bipolar disorder ([address]     Depression     Dysmenorrhea     Christen-Danlos syndrome type III 2019    GERD (gastroesophageal reflux disease)     Headache     Menorrhagia     Migraine with aura     Postural orthostatic tachycardia syndrome 2014    Syringomyelia ([facility] [address] 2019    Urinary incontinence                                              OB History    Para Term  AB Living   0 0 0 0 0 0   SAB IAB Ectopic Molar Multiple Live Births   0 0 0 0 0 0         Past Surgical History:   Past Surgical History:   Procedure Laterality Date     [address] EXTRACTION   Allergies: Reglan [metoclopramide], Abilify [aripiprazole], and Aloe vera     Medications:   Current Outpatient Medications   Medication Sig Dispense Refill    estradiol (ESTRACE VAGINAL) 0.1 MG/GM vaginal cream Place 0.5 g vaginally See Admin Instructions Apply 0.5 g topically to vulva each night. 45 g 0    busPIRone (BUSPAR) 10 MG tablet take 1 tablet by mouth twice a day 60 tablet 5    lamoTRIgine (LAMICTAL) 100 MG tablet Take 1 tablet by mouth daily Indications: Mood Disorder take 1 tablet by mouth once daily 90 tablet 1    DULoxetine (CYMBALTA) 30 MG extended release capsule take 1 capsule by mouth twice a day 180 capsule 5    gabapentin (NEURONTIN) 300 MG capsule take 1 capsule by mouth twice a day 180 capsule 0    baclofen (LIORESAL) 10 MG tablet take 1 tablet by mouth twice a day 180 tablet 1    Ubrogepant (UBRELVY) 50 MG TABS Take 50 mg by mouth daily as needed (Migraine) May repeat dose after 2 hours if headache is still present. Do not exceed 2 tablets in 24 hours. 30 tablet 0    hydrocortisone (ANUSOL-HC) 25 MG suppository Place 1 suppository rectally 2 times daily as needed for Hemorrhoids 12 suppository 1    fludrocortisone (FLORINEF) 0.1 MG tablet take 1 tablet by mouth once daily (Patient taking differently: Take 0.1 mg by mouth daily Indications: Postural Orthostatic Tachycardia take 1 tablet by mouth once daily) 90 tablet 3    famotidine (PEPCID) 20 MG tablet Take 20 mg by mouth 2 times daily Taking as needed before ibuprofen      Levonorgestrel [facility]) IUD 19.5 mg 19.5 each by IntraUTERine route Dec 2020 inserted, remove 5 years  Manage endometriosis, not confirmed      loratadine (CLARITIN) 10 MG capsule Take 10 mg by mouth daily Indications:  Allergic Rhinitis       ASPIRIN LOW DOSE 81 MG EC tablet TAKE ONE TABLET BY MOUTH TWO TIMES A DAY FOR 3 WEEKS (Patient not taking: No sig reported)      celecoxib (CELEBREX) 200 MG capsule Takes as needed (Patient not taking: Reported on 10/18/2022) lidocaine (XYLOCAINE) 5 % ointment Apply topically as needed. (Patient not taking: Reported on 10/18/2022) 30 g 1    traMADol (ULTRAM) 50 MG tablet Take 50 mg by mouth every 6 hours as needed. Indications: Pain (Patient not taking: No sig reported)      Cholecalciferol 50 MCG (2000 UT) TABS Take 2,000 Units by mouth nightly (Patient not taking: Reported on 10/18/2022)       No current facility-administered medications for this visit. Social History:   Social History     Tobacco Use    Smoking status: Never    Smokeless tobacco: Never   Substance Use Topics    Alcohol use: Never        Family History:   Family History   Problem Relation Age of Onset    Heart Disease Mother     Other Mother         Spinocerebellar ataxia    Mental Illness Mother     Miscarriages / Stillbirths Mother     Depression Mother     High Cholesterol Mother     Mental Illness Father     Depression Father     Heart Disease Sister     Mental Illness Brother     Learning Disabilities Brother     Dementia Maternal Grandmother     Heart Attack Maternal Grandfather     Heart Disease Maternal Grandfather     High Cholesterol Maternal Grandfather     Alcohol Abuse Maternal Grandfather     Heart Attack Paternal Grandfather     Heart Disease Paternal Grandfather     Heart Attack Maternal Uncle     Heart Disease Maternal Uncle     Mental Illness Paternal Aunt     Substance Abuse Paternal Aunt     Mental Illness Paternal Uncle     Substance Abuse Paternal Uncle     Alcohol Abuse Paternal Uncle     Substance Abuse Paternal Cousin        REVIEW OF SYSTEMS:    Constitutional: negative  HEENT: negative  Breast: negative  Respiratory: negative  Cardiovascular: negative  Gastrointestinal: negative  Genitourinary:negative  Integument: negative  Neurological: negative  Endocrine: negative          PHYSICAL EXAM  /70 (Position: Sitting)   Pulse (!) 107   Wt 126 lb (57.2 kg)   BMI 19.16 kg/m²   No LMP recorded.  (Menstrual status: Other - See Notes). General appearance: alert, cooperative and in no acute distress. Head: NCAT   Psych: No acute distress, mood and affect full range  Neuro: Alert and oriented, no focal deficits          ASSESSMENT :   Diagnosis Orders   1. Dyspareunia, female  US NON OB TRANSVAGINAL    estradiol (ESTRACE VAGINAL) 0.1 MG/GM vaginal cream      2. Vulvodynia  estradiol (ESTRACE VAGINAL) 0.1 MG/GM vaginal cream      3. Dysmenorrhea  US NON OB TRANSVAGINAL           PLAN:    Return in about 4 weeks (around 11/15/2022) for Review results. Pt is considering conceiving and even possibly adopting an embryo as early as 6 months from now. Discussed not changing contraception method in the mean time. Consider diagnostic laparoscopy if pain continues or worsens vs removal of IUD and trying to conceive. Patient declines MELBA consult at this time, would like to wait to see if  gets into grad school and if they are moving. Recommend prenatal vitamins as well. Orders Placed This Encounter   Medications    estradiol (ESTRACE VAGINAL) 0.1 MG/GM vaginal cream     Sig: Place 0.5 g vaginally See Admin Instructions Apply 0.5 g topically to vulva each night.      Dispense:  45 g     Refill:  0         Orders Placed This Encounter   Procedures    US NON OB TRANSVAGINAL     Standing Status:   Future     Standing Expiration Date:   10/18/2023           Electronically signed by Lanre Dacosta DO on 10/18/22

## 2022-11-06 DIAGNOSIS — G90.A POTS (POSTURAL ORTHOSTATIC TACHYCARDIA SYNDROME): ICD-10-CM

## 2022-11-06 DIAGNOSIS — N94.6 DYSMENORRHEA: ICD-10-CM

## 2022-11-06 DIAGNOSIS — F41.9 ANXIETY: ICD-10-CM

## 2022-11-06 DIAGNOSIS — F39 MOOD DISORDER (HCC): ICD-10-CM

## 2022-11-06 DIAGNOSIS — N92.1 MENORRHAGIA WITH IRREGULAR CYCLE: ICD-10-CM

## 2022-11-06 DIAGNOSIS — K59.09 CHRONIC CONSTIPATION: ICD-10-CM

## 2022-11-06 DIAGNOSIS — M25.50 ARTHRALGIA, UNSPECIFIED JOINT: ICD-10-CM

## 2022-11-06 DIAGNOSIS — F32.A DEPRESSION, UNSPECIFIED DEPRESSION TYPE: ICD-10-CM

## 2022-11-06 DIAGNOSIS — M62.838 MUSCLE SPASM: ICD-10-CM

## 2022-11-06 DIAGNOSIS — Q79.62 HYPERMOBILE EHLERS-DANLOS SYNDROME: ICD-10-CM

## 2022-11-07 ENCOUNTER — HOSPITAL ENCOUNTER (OUTPATIENT)
Dept: ULTRASOUND IMAGING | Age: 22
Discharge: HOME | End: 2022-11-09
Payer: COMMERCIAL

## 2022-11-07 DIAGNOSIS — N94.6 DYSMENORRHEA: ICD-10-CM

## 2022-11-07 DIAGNOSIS — N94.10 DYSPAREUNIA, FEMALE: ICD-10-CM

## 2022-11-07 PROCEDURE — 76830 TRANSVAGINAL US NON-OB: CPT

## 2022-11-07 RX ORDER — UBROGEPANT 50 MG/1
50 TABLET ORAL DAILY PRN
Qty: 30 TABLET | Refills: 0 | OUTPATIENT
Start: 2022-11-07

## 2022-11-07 RX ORDER — BACLOFEN 10 MG/1
TABLET ORAL
Qty: 180 TABLET | Refills: 1 | OUTPATIENT
Start: 2022-11-07

## 2022-11-09 ENCOUNTER — OFFICE VISIT (OUTPATIENT)
Dept: OBGYN | Age: 22
End: 2022-11-09
Payer: COMMERCIAL

## 2022-11-09 VITALS — SYSTOLIC BLOOD PRESSURE: 118 MMHG | DIASTOLIC BLOOD PRESSURE: 76 MMHG | HEART RATE: 119 BPM

## 2022-11-09 DIAGNOSIS — N94.6 DYSMENORRHEA: Primary | ICD-10-CM

## 2022-11-09 DIAGNOSIS — N94.10 DYSPAREUNIA, FEMALE: ICD-10-CM

## 2022-11-09 PROCEDURE — 1036F TOBACCO NON-USER: CPT | Performed by: OBSTETRICS & GYNECOLOGY

## 2022-11-09 PROCEDURE — 99213 OFFICE O/P EST LOW 20 MIN: CPT | Performed by: OBSTETRICS & GYNECOLOGY

## 2022-11-09 PROCEDURE — G8482 FLU IMMUNIZE ORDER/ADMIN: HCPCS | Performed by: OBSTETRICS & GYNECOLOGY

## 2022-11-09 PROCEDURE — G8420 CALC BMI NORM PARAMETERS: HCPCS | Performed by: OBSTETRICS & GYNECOLOGY

## 2022-11-09 PROCEDURE — 99212 OFFICE O/P EST SF 10 MIN: CPT | Performed by: OBSTETRICS & GYNECOLOGY

## 2022-11-09 PROCEDURE — G8427 DOCREV CUR MEDS BY ELIG CLIN: HCPCS | Performed by: OBSTETRICS & GYNECOLOGY

## 2022-11-09 NOTE — PROGRESS NOTES
HISTORY OF PRESENT ILLNESS:    Pt presents for follow up for dyspareunia and dysmenorrhea. US was wnl with appropriately placed IUD. Pt would like to try for conception next September. Past Medical History:   Past Medical History:   Diagnosis Date    Anxiety     Autism spectrum disorder 2019    Bipolar disorder ([facility] [address]     Depression     Dysmenorrhea     Christen-Danlos syndrome type III 2019    GERD (gastroesophageal reflux disease)     Headache     Menorrhagia     Migraine with aura     Postural orthostatic tachycardia syndrome 2014    Syringomyelia ([facility] [address] 2019    Urinary incontinence                                              OB History    Para Term  AB Living   0 0 0 0 0 0   SAB IAB Ectopic Molar Multiple Live Births   0 0 0 0 0 0         Past Surgical History:   Past Surgical History:   Procedure Laterality Date    CHOLECYSTECTOMY      HIP SURGERY      WISDOM TOOTH EXTRACTION          Allergies: Reglan [metoclopramide], Abilify [aripiprazole], and Aloe vera     Medications:   Current Outpatient Medications   Medication Sig Dispense Refill    estradiol (ESTRACE VAGINAL) 0.1 MG/GM vaginal cream Place 0.5 g vaginally See Admin Instructions Apply 0.5 g topically to vulva each night. 45 g 0    busPIRone (BUSPAR) 10 MG tablet take 1 tablet by mouth twice a day 60 tablet 5    lamoTRIgine (LAMICTAL) 100 MG tablet Take 1 tablet by mouth daily Indications: Mood Disorder take 1 tablet by mouth once daily 90 tablet 1    DULoxetine (CYMBALTA) 30 MG extended release capsule take 1 capsule by mouth twice a day 180 capsule 5    gabapentin (NEURONTIN) 300 MG capsule take 1 capsule by mouth twice a day 180 capsule 0    baclofen (LIORESAL) 10 MG tablet take 1 tablet by mouth twice a day 180 tablet 1    Ubrogepant (UBRELVY) 50 MG TABS Take 50 mg by mouth daily as needed (Migraine) May repeat dose after 2 hours if headache is still present. Do not exceed 2 tablets in 24 hours.  30 tablet 0 hydrocortisone (ANUSOL-HC) 25 MG suppository Place 1 suppository rectally 2 times daily as needed for Hemorrhoids 12 suppository 1    fludrocortisone (FLORINEF) 0.1 MG tablet take 1 tablet by mouth once daily (Patient taking differently: Take 0.1 mg by mouth daily Indications: Postural Orthostatic Tachycardia take 1 tablet by mouth once daily) 90 tablet 3    famotidine (PEPCID) 20 MG tablet Take 20 mg by mouth 2 times daily Taking as needed before ibuprofen      Levonorgestrel [facility]) IUD 19.5 mg 19.5 each by IntraUTERine route Dec 2020 inserted, remove 5 years  Manage endometriosis, not confirmed      loratadine (CLARITIN) 10 MG capsule Take 10 mg by mouth daily Indications: Allergic Rhinitis       ASPIRIN LOW DOSE 81 MG EC tablet TAKE ONE TABLET BY MOUTH TWO TIMES A DAY FOR 3 WEEKS (Patient not taking: No sig reported)      celecoxib (CELEBREX) 200 MG capsule Takes as needed (Patient not taking: No sig reported)      lidocaine (XYLOCAINE) 5 % ointment Apply topically as needed. (Patient not taking: No sig reported) 30 g 1    traMADol (ULTRAM) 50 MG tablet Take 50 mg by mouth every 6 hours as needed. Indications: Pain (Patient not taking: No sig reported)      Cholecalciferol 50 MCG (2000 UT) TABS Take 2,000 Units by mouth nightly (Patient not taking: Reported on 10/18/2022)       No current facility-administered medications for this visit.          Social History:   Social History     Tobacco Use    Smoking status: Never    Smokeless tobacco: Never   Substance Use Topics    Alcohol use: Never        Family History:   Family History   Problem Relation Age of Onset    Heart Disease Mother     Other Mother         Spinocerebellar ataxia    Mental Illness Mother     Miscarriages / [place] Mother     Depression Mother     High Cholesterol Mother     Mental Illness Father     Depression Father     Heart Disease Sister     Mental Illness Brother     Learning Disabilities Brother     Dementia Maternal Grandmother Heart Attack Maternal Grandfather     Heart Disease Maternal Grandfather     High Cholesterol Maternal Grandfather     Alcohol Abuse Maternal Grandfather     Heart Attack Paternal Grandfather     Heart Disease Paternal Grandfather     Heart Attack Maternal Uncle     Heart Disease Maternal Uncle     Mental Illness Paternal Aunt     Substance Abuse Paternal Aunt     Mental Illness Paternal Uncle     Substance Abuse Paternal Uncle     Alcohol Abuse Paternal Uncle     Substance Abuse Paternal Cousin        REVIEW OF SYSTEMS:    Constitutional: negative  HEENT: negative  Breast: negative  Respiratory: negative  Cardiovascular: negative  Gastrointestinal: negative  Genitourinary:negative  Integument: negative  Neurological: negative  Endocrine: negative          PHYSICAL EXAM  /76 (Site: Left Upper Arm, Position: Sitting)   Pulse (!) 119   No LMP recorded. (Menstrual status: Other - See Notes). General appearance: alert, cooperative and in no acute distress. Head: NCAT   Psych: No acute distress, mood and affect full range  Neuro: Alert and oriented, no focal deficits          ASSESSMENT :   Diagnosis Orders   1. Dysmenorrhea        2. Dyspareunia, female             PLAN:  Discussed continuing with IUD vs trialing progesterone only method. Pt has history of migraines with aura for which estrogen would be contraindicated. Would not recommend depo or implant if they are wanting to conceive in the next year. Pt states she would like to think about it. F/u PRN or at annual in August.   No follow-ups on file. No orders of the defined types were placed in this encounter. No orders of the defined types were placed in this encounter.         Electronically signed by Danie Cornelius DO on 11/9/22

## 2022-11-09 NOTE — PROGRESS NOTES
Patient alert and pleasant with no complaints  Here today for U/S results. Discharge instructions have been discussed with the patient. Patient advised to call our office with any questions or concerns. Voiced understanding.

## 2022-11-18 DIAGNOSIS — N94.6 DYSMENORRHEA: ICD-10-CM

## 2022-11-18 DIAGNOSIS — K59.09 CHRONIC CONSTIPATION: ICD-10-CM

## 2022-11-18 DIAGNOSIS — M62.838 MUSCLE SPASM: ICD-10-CM

## 2022-11-18 DIAGNOSIS — F39 MOOD DISORDER (HCC): ICD-10-CM

## 2022-11-18 DIAGNOSIS — M54.32 SCIATICA OF LEFT SIDE: ICD-10-CM

## 2022-11-18 DIAGNOSIS — N92.1 MENORRHAGIA WITH IRREGULAR CYCLE: ICD-10-CM

## 2022-11-18 DIAGNOSIS — F32.A DEPRESSION, UNSPECIFIED DEPRESSION TYPE: ICD-10-CM

## 2022-11-18 DIAGNOSIS — M25.50 ARTHRALGIA, UNSPECIFIED JOINT: ICD-10-CM

## 2022-11-18 DIAGNOSIS — F41.9 ANXIETY: ICD-10-CM

## 2022-11-18 DIAGNOSIS — Q79.62 HYPERMOBILE EHLERS-DANLOS SYNDROME: ICD-10-CM

## 2022-11-18 DIAGNOSIS — G90.A POTS (POSTURAL ORTHOSTATIC TACHYCARDIA SYNDROME): ICD-10-CM

## 2022-11-18 RX ORDER — UBROGEPANT 50 MG/1
50 TABLET ORAL DAILY PRN
Qty: 30 TABLET | Refills: 0 | Status: SHIPPED | OUTPATIENT
Start: 2022-11-18

## 2022-11-18 RX ORDER — BACLOFEN 10 MG/1
TABLET ORAL
Qty: 180 TABLET | Refills: 1 | OUTPATIENT
Start: 2022-11-18

## 2022-11-18 RX ORDER — GABAPENTIN 300 MG/1
CAPSULE ORAL
Qty: 180 CAPSULE | Refills: 0 | OUTPATIENT
Start: 2022-11-18 | End: 2023-02-16

## 2022-11-18 RX ORDER — GABAPENTIN 300 MG/1
300 CAPSULE ORAL 2 TIMES DAILY
Qty: 180 CAPSULE | Refills: 0 | Status: SHIPPED | OUTPATIENT
Start: 2022-11-18 | End: 2023-02-16

## 2022-11-18 RX ORDER — BACLOFEN 10 MG/1
TABLET ORAL
Qty: 180 TABLET | Refills: 1 | Status: SHIPPED | OUTPATIENT
Start: 2022-11-18

## 2022-11-18 RX ORDER — UBROGEPANT 50 MG/1
50 TABLET ORAL DAILY PRN
Qty: 30 TABLET | Refills: 0 | OUTPATIENT
Start: 2022-11-18

## 2022-11-22 NOTE — PROGRESS NOTES
[facility] Internal Medicine      SUBJECTIVE:  Maddison Breen (:  2000) is a 25 y.o. adult here for evaluation of the following chief complaint(s):  Follow-up  Sleep issues - trouble falling asleep. Has sleep medicine appointment this afternoon at 1 PM with Dr. Soniya Iglesias. Still not sleeping well. Taking Benadryl about 3 times a week. Takes naps when able. Will be doing longer shifts at work. Await sleep evaluation. Migraine - was referred to neurology for ongoing migraine headaches. No appointment has been scheduled thus far. Using a bit less often lately. Using about 3 times per month. Continues to try and avoid triggers (strong smells). Continue same therapy. Neurology consult pending. Dysparuenia - was evaluated with US by GYN which was normal.  No new treatment recommendations were made at the time. Further follow-up to be completed with plastic surgery. Was in ER on 10/15/2022 due to interaction of Paxlovid and buspirone. ER visit was at [facility]. These symptoms have now resolved. Ehler-Danlos syndrome with hip pain - was to have an appointment earlier this month. Was unable to make this appointment. [place] better for a time after injection from Dr. Edwards Officer. Recently, Amanda Sanchez has noticed clicking and pain in the L hip. Needs to reschedule with orthopedics in [place], [place]. Awaiting work schedule to make this visit. Await further ortho recommendations. Tachycardia - was told that this was secondary to dysautonomia   Will discuss with cardiology to see if any further cardiac evaluation is warranted given POTS disease. Message left for a call back. Will double check TSH and CBC to ensure these are normal and not contributing to tachycardia. Discussed with cardio - will await TSH and CBC and if normal will order 2-D Echo.   I will then proceed with cardiology evaluation for further treatment recommendations such as B-blocker therapy. Review of Systems as above. Current Outpatient Medications on File Prior to Visit   Medication Sig Dispense Refill    gabapentin (NEURONTIN) 300 MG capsule Take 1 capsule by mouth in the morning and at bedtime for 90 days. 180 capsule 0    baclofen (LIORESAL) 10 MG tablet take 1 tablet by mouth twice a day 180 tablet 1    Ubrogepant (UBRELVY) 50 MG TABS Take 50 mg by mouth daily as needed (Migraine) May repeat dose after 2 hours if headache is still present. Do not exceed 2 tablets in 24 hours. 30 tablet 0    estradiol (ESTRACE VAGINAL) 0.1 MG/GM vaginal cream Place 0.5 g vaginally See Admin Instructions Apply 0.5 g topically to vulva each night. 45 g 0    busPIRone (BUSPAR) 10 MG tablet take 1 tablet by mouth twice a day 60 tablet 5    lamoTRIgine (LAMICTAL) 100 MG tablet Take 1 tablet by mouth daily Indications: Mood Disorder take 1 tablet by mouth once daily 90 tablet 1    DULoxetine (CYMBALTA) 30 MG extended release capsule take 1 capsule by mouth twice a day 180 capsule 5    celecoxib (CELEBREX) 200 MG capsule Takes as needed      hydrocortisone (ANUSOL-HC) 25 MG suppository Place 1 suppository rectally 2 times daily as needed for Hemorrhoids 12 suppository 1    fludrocortisone (FLORINEF) 0.1 MG tablet take 1 tablet by mouth once daily (Patient taking differently: Take 0.1 mg by mouth daily Indications: Postural Orthostatic Tachycardia take 1 tablet by mouth once daily) 90 tablet 3    famotidine (PEPCID) 20 MG tablet Take 20 mg by mouth 2 times daily Taking as needed before ibuprofen      Levonorgestrel [facility]) IUD 19.5 mg 19.5 each by IntraUTERine route Dec 2020 inserted, remove 5 years  Manage endometriosis, not confirmed      Cholecalciferol 50 MCG (2000 UT) TABS Take 2,000 Units by mouth nightly      loratadine (CLARITIN) 10 MG capsule Take 10 mg by mouth daily Indications:  Allergic Rhinitis       ASPIRIN LOW DOSE 81 MG EC tablet TAKE ONE TABLET BY MOUTH TWO TIMES A DAY FOR 3 WEEKS (Patient not taking: No sig reported)      lidocaine (XYLOCAINE) 5 % ointment Apply topically as needed. (Patient not taking: No sig reported) 30 g 1    traMADol (ULTRAM) 50 MG tablet Take 50 mg by mouth every 6 hours as needed. Indications: Pain (Patient not taking: No sig reported)       No current facility-administered medications on file prior to visit. OBJECTIVE:    VS:   Vitals:    11/23/22 1004   BP: 117/79   Site: Right Upper Arm   Position: Sitting   Cuff Size: Medium Adult   Pulse: (!) 114   Resp: 18   Temp: 97.3 °F (36.3 °C)   TempSrc: Temporal   SpO2: 98%   Weight: 126 lb (57.2 kg)   Height: 5' 8\" (1.727 m)     Physical Exam   Lungs:  CTA B  Neck:   No carotid bruits appreciated B.   CVS:  +s1/s2 without m/g/r appreciated. +Tachycardic   Abd:  + BS, NTND, No renal or aortic bruits   Extr:  2+ DP/PT pulses B, no pitting edema    RTC:  Return in about 8 weeks (around 1/18/2023).     Morro Casper MD   11/23/2022 10:32 AM

## 2022-11-23 ENCOUNTER — OFFICE VISIT (OUTPATIENT)
Dept: SLEEP MEDICINE | Age: 22
End: 2022-11-23
Payer: COMMERCIAL

## 2022-11-23 ENCOUNTER — PATIENT MESSAGE (OUTPATIENT)
Dept: INTERNAL MEDICINE | Age: 22
End: 2022-11-23

## 2022-11-23 ENCOUNTER — OFFICE VISIT (OUTPATIENT)
Dept: INTERNAL MEDICINE | Age: 22
End: 2022-11-23
Payer: COMMERCIAL

## 2022-11-23 VITALS
HEIGHT: 68 IN | DIASTOLIC BLOOD PRESSURE: 79 MMHG | BODY MASS INDEX: 19.1 KG/M2 | HEART RATE: 114 BPM | WEIGHT: 126 LBS | RESPIRATION RATE: 18 BRPM | TEMPERATURE: 97.3 F | OXYGEN SATURATION: 98 % | SYSTOLIC BLOOD PRESSURE: 117 MMHG

## 2022-11-23 VITALS
HEIGHT: 68 IN | BODY MASS INDEX: 19.04 KG/M2 | HEART RATE: 99 BPM | SYSTOLIC BLOOD PRESSURE: 106 MMHG | OXYGEN SATURATION: 99 % | RESPIRATION RATE: 16 BRPM | WEIGHT: 125.6 LBS | DIASTOLIC BLOOD PRESSURE: 70 MMHG

## 2022-11-23 DIAGNOSIS — Q79.60 EHLERS-DANLOS SYNDROME: ICD-10-CM

## 2022-11-23 DIAGNOSIS — G90.A POTS (POSTURAL ORTHOSTATIC TACHYCARDIA SYNDROME): ICD-10-CM

## 2022-11-23 DIAGNOSIS — G47.21 CIRCADIAN RHYTHM SLEEP DISORDER, DELAYED SLEEP PHASE TYPE: ICD-10-CM

## 2022-11-23 DIAGNOSIS — G90.A POTS (POSTURAL ORTHOSTATIC TACHYCARDIA SYNDROME): Primary | ICD-10-CM

## 2022-11-23 DIAGNOSIS — G43.909 NONINTRACTABLE CHRONIC MIGRAINE: ICD-10-CM

## 2022-11-23 DIAGNOSIS — G47.411 PRIMARY NARCOLEPSY WITH CATAPLEXY: Primary | ICD-10-CM

## 2022-11-23 DIAGNOSIS — G47.09 OTHER INSOMNIA: ICD-10-CM

## 2022-11-23 DIAGNOSIS — G47.59 OTHER PARASOMNIA: ICD-10-CM

## 2022-11-23 LAB
BASOPHILS ABSOLUTE: 0.06 E9/L (ref 0–0.2)
BASOPHILS RELATIVE PERCENT: 1.1 % (ref 0–2)
EOSINOPHILS ABSOLUTE: 0.05 E9/L (ref 0.05–0.5)
EOSINOPHILS RELATIVE PERCENT: 0.9 % (ref 0–6)
HCT VFR BLD CALC: 42 % (ref 34–48)
HEMOGLOBIN: 13.4 G/DL (ref 11.5–15.5)
IMMATURE GRANULOCYTES #: 0 E9/L
IMMATURE GRANULOCYTES %: 0 % (ref 0–5)
LYMPHOCYTES ABSOLUTE: 2.29 E9/L (ref 1.5–4)
LYMPHOCYTES RELATIVE PERCENT: 43.1 % (ref 20–42)
MCH RBC QN AUTO: 29.4 PG (ref 26–35)
MCHC RBC AUTO-ENTMCNC: 31.9 % (ref 32–34.5)
MCV RBC AUTO: 92.1 FL (ref 80–99.9)
MONOCYTES ABSOLUTE: 0.35 E9/L (ref 0.1–0.95)
MONOCYTES RELATIVE PERCENT: 6.6 % (ref 2–12)
NEUTROPHILS ABSOLUTE: 2.56 E9/L (ref 1.8–7.3)
NEUTROPHILS RELATIVE PERCENT: 48.3 % (ref 43–80)
PDW BLD-RTO: 11.9 FL (ref 11.5–15)
PLATELET # BLD: 259 E9/L (ref 130–450)
PMV BLD AUTO: 10.3 FL (ref 7–12)
RBC # BLD: 4.56 E12/L (ref 3.5–5.5)
TSH SERPL DL<=0.05 MIU/L-ACNC: 2.3 UIU/ML (ref 0.27–4.2)
WBC # BLD: 5.3 E9/L (ref 4.5–11.5)

## 2022-11-23 PROCEDURE — G8482 FLU IMMUNIZE ORDER/ADMIN: HCPCS | Performed by: INTERNAL MEDICINE

## 2022-11-23 PROCEDURE — 99213 OFFICE O/P EST LOW 20 MIN: CPT | Performed by: INTERNAL MEDICINE

## 2022-11-23 PROCEDURE — 1036F TOBACCO NON-USER: CPT | Performed by: INTERNAL MEDICINE

## 2022-11-23 PROCEDURE — G8420 CALC BMI NORM PARAMETERS: HCPCS | Performed by: INTERNAL MEDICINE

## 2022-11-23 PROCEDURE — 36415 COLL VENOUS BLD VENIPUNCTURE: CPT

## 2022-11-23 PROCEDURE — 36415 COLL VENOUS BLD VENIPUNCTURE: CPT | Performed by: INTERNAL MEDICINE

## 2022-11-23 PROCEDURE — G8427 DOCREV CUR MEDS BY ELIG CLIN: HCPCS | Performed by: INTERNAL MEDICINE

## 2022-11-23 PROCEDURE — 99205 OFFICE O/P NEW HI 60 MIN: CPT | Performed by: INTERNAL MEDICINE

## 2022-11-23 ASSESSMENT — ENCOUNTER SYMPTOMS
RESPIRATORY NEGATIVE: 1
EYES NEGATIVE: 1
ALLERGIC/IMMUNOLOGIC NEGATIVE: 1
GASTROINTESTINAL NEGATIVE: 1

## 2022-11-23 ASSESSMENT — SLEEP AND FATIGUE QUESTIONNAIRES
HOW LIKELY ARE YOU TO NOD OFF OR FALL ASLEEP WHILE SITTING AND READING: 3
HOW LIKELY ARE YOU TO NOD OFF OR FALL ASLEEP IN A CAR, WHILE STOPPED FOR A FEW MINUTES IN TRAFFIC: 0
HOW LIKELY ARE YOU TO NOD OFF OR FALL ASLEEP WHILE SITTING QUIETLY AFTER LUNCH WITHOUT ALCOHOL: 3
ESS TOTAL SCORE: 13
HOW LIKELY ARE YOU TO NOD OFF OR FALL ASLEEP WHILE SITTING INACTIVE IN A PUBLIC PLACE: 1
HOW LIKELY ARE YOU TO NOD OFF OR FALL ASLEEP WHILE SITTING AND TALKING TO SOMEONE: 0
HOW LIKELY ARE YOU TO NOD OFF OR FALL ASLEEP WHEN YOU ARE A PASSENGER IN A CAR FOR AN HOUR WITHOUT A BREAK: 3
HOW LIKELY ARE YOU TO NOD OFF OR FALL ASLEEP WHILE WATCHING TV: 1
HOW LIKELY ARE YOU TO NOD OFF OR FALL ASLEEP WHILE LYING DOWN TO REST IN THE AFTERNOON WHEN CIRCUMSTANCES PERMIT: 2

## 2022-11-23 NOTE — TELEPHONE ENCOUNTER
From: Martha Rucker  To: Dr. Tamiko Gonzalez  Sent: 11/23/2022 11:29 AM EST  Subject: Neurology: MRI needed    Hi Dr. Natalie Tinoco,  I called neurology and they said they need you to order an MRI of my brain and I need to have that scheduled before I can make an appointment with them so I can make sure I have the results before seeing them.      Thank you,  Clementina Dubon

## 2022-11-23 NOTE — PROGRESS NOTES
[facility]    Patient Name: Mian Flores  Age: 25 y.o.   : 2000    Date of Visit: 22        HPI   Mian Flores is a 25 y.o. adult with  has a past medical history of Anxiety, Autism spectrum disorder (), Bipolar disorder ([facility] Depression, Dysmenorrhea, Christen-Danlos syndrome type III (), GERD (gastroesophageal reflux disease), Headache, Menorrhagia, Migraine with aura, Postural orthostatic tachycardia syndrome (), Syringomyelia ([facility] (), and Urinary incontinence. who presents as a new patient to Sleep Clinic, referred by Dr. Angely Veras, for Insomnia  .       STOP-BANG:  Snore- no   Tired- yes  Observed apneas/gasping/choking- no  High blood pressure- no  BMI > 35kg/sq m - no  Age > 50 - no  Neck circumference > 40 cm - no  Gender male - no  Total score     Sleep Medicine 2022   Sitting and reading 3   Watching TV 1   Sitting, inactive in a public place (e.g. a theatre or a meeting) 1   As a passenger in a car for an hour without a break 3   Lying down to rest in the afternoon when circumstances permit 2   Sitting and talking to someone 0   Sitting quietly after a lunch without alcohol 3   In a car, while stopped for a few minutes in traffic 0   [place] Sleepiness Score 13   Neck circumference (Inches) 12.5        Has been sleepy since a child, reports at times would sleep for almost 24 hours  Prev tried Ambien, would wakeup after 5-6 hours    Having difficulty falling asleep - mind racing  Avoids phone, avoids staying in bed when awake   Uses black out curtain and sun lamp  Benadryl 3 times per week, 50 mg  Melatonin caused a lot of sweating  Has tried josiah for self guided CBTi and progressive muscle relaxation and meditation    Dinner at 8 pm  Has a bed time routine after dinner and gets into bed at 11 pm    Is very restless during sleep   has video of sleeping - has facial/neck muscle twitching jerking    Would naturally sleep 3am and wake up 11 am  Feels like a night owl, this does run in the family  Growing up family would have supper at 11 pm  Estimated total sleep time currently is maybe 4-6 hours  Body would feel best with ~10 hours    Work  shifts are usually 3-7 pm, limited by pain    Depression currently controlled  Dose have history of catatonic depression and suicidality but doing well on current medications    On gabapentin and baclofen for neuropathy and tremors  Lamictal at night  Duloxetine Myara and before bed    In late teenage years would get episodes of severe sleepiness and would start dreaming right away    No caffeine    Occasional AM headaches     Pt does  report sleepiness while driving but will pull over. Pt does  work - retail. RLS: no   Narcolepsy: sometimes has strong urges to sleep during the day   Cataplexy: no. Sometimes when having a panic attack feels tingling in face. POTS attacks feels dizzy, cold, short of breath but not associated with strong emotions.   Hypnagogic/hypnopompic hallucinations: when falling asleep often heals people talking or music playing   Sleep paralysis: no   Sleep walking/talking/eating: no   Does have Bruxism       PMH:  Past Medical History:   Diagnosis Date    Anxiety     Autism spectrum disorder 2019    Bipolar disorder ([facility]     Depression     Dysmenorrhea     Christen-Danlos syndrome type III 2019    GERD (gastroesophageal reflux disease)     Headache     Menorrhagia     Migraine with aura     Postural orthostatic tachycardia syndrome 2014    Syringomyelia ([facility] 2019    Urinary incontinence         PSH:  Past Surgical History:   Procedure Laterality Date    CHOLECYSTECTOMY      HIP SURGERY      WISDOM TOOTH EXTRACTION  2019          Soc Hx:  Social History     Tobacco Use    Smoking status: Never    Smokeless tobacco: Never   Substance Use Topics    Alcohol use: Never    Drug use: Never        Fam Hx:  Family History   Problem Relation Age of Onset    Heart Disease Mother     Other Mother Spinocerebellar ataxia    Mental Illness Mother     Miscarriages / [place] Setting Mother     Depression Mother     High Cholesterol Mother     Mental Illness Father     Depression Father     Heart Disease Sister     Mental Illness Brother     Learning Disabilities Brother     Dementia Maternal Grandmother     Heart Attack Maternal Grandfather     Heart Disease Maternal Grandfather     High Cholesterol Maternal Grandfather     Alcohol Abuse Maternal Grandfather     Heart Attack Paternal Grandfather     Heart Disease Paternal Grandfather     Heart Attack Maternal Uncle     Heart Disease Maternal Uncle     Mental Illness Paternal Aunt     Substance Abuse Paternal Aunt     Mental Illness Paternal Uncle     Substance Abuse Paternal Uncle     Alcohol Abuse Paternal Uncle     Substance Abuse Paternal Cousin         Current Outpatient Medications   Medication Sig Dispense Refill    gabapentin (NEURONTIN) 300 MG capsule Take 1 capsule by mouth in the morning and at bedtime for 90 days. 180 capsule 0    baclofen (LIORESAL) 10 MG tablet take 1 tablet by mouth twice a day 180 tablet 1    Ubrogepant (UBRELVY) 50 MG TABS Take 50 mg by mouth daily as needed (Migraine) May repeat dose after 2 hours if headache is still present. Do not exceed 2 tablets in 24 hours. 30 tablet 0    estradiol (ESTRACE VAGINAL) 0.1 MG/GM vaginal cream Place 0.5 g vaginally See Admin Instructions Apply 0.5 g topically to vulva each night.  45 g 0    busPIRone (BUSPAR) 10 MG tablet take 1 tablet by mouth twice a day 60 tablet 5    lamoTRIgine (LAMICTAL) 100 MG tablet Take 1 tablet by mouth daily Indications: Mood Disorder take 1 tablet by mouth once daily 90 tablet 1    DULoxetine (CYMBALTA) 30 MG extended release capsule take 1 capsule by mouth twice a day 180 capsule 5    celecoxib (CELEBREX) 200 MG capsule Takes as needed      hydrocortisone (ANUSOL-HC) 25 MG suppository Place 1 suppository rectally 2 times daily as needed for Hemorrhoids 12 suppository 1    fludrocortisone (FLORINEF) 0.1 MG tablet take 1 tablet by mouth once daily (Patient taking differently: Take 0.1 mg by mouth daily Indications: Postural Orthostatic Tachycardia take 1 tablet by mouth once daily) 90 tablet 3    famotidine (PEPCID) 20 MG tablet Take 20 mg by mouth 2 times daily Taking as needed before ibuprofen      Levonorgestrel [facility]) IUD 19.5 mg 19.5 each by IntraUTERine route Dec 2020 inserted, remove 5 years  Manage endometriosis, not confirmed      Cholecalciferol 50 MCG (2000 UT) TABS Take 2,000 Units by mouth nightly      loratadine (CLARITIN) 10 MG capsule Take 10 mg by mouth daily Indications: Allergic Rhinitis       lidocaine (XYLOCAINE) 5 % ointment Apply topically as needed. (Patient not taking: No sig reported) 30 g 1     No current facility-administered medications for this visit. Review of Systems  (Sleep ROS above)  Review of Systems   Constitutional:  Positive for fatigue. HENT: Negative. Eyes: Negative. Respiratory: Negative. Cardiovascular:  Positive for palpitations. Gastrointestinal: Negative. Endocrine: Negative. Genitourinary: Negative. Musculoskeletal:  Positive for arthralgias. Skin: Negative. Allergic/Immunologic: Negative. Neurological:  Positive for dizziness and tremors. Hematological: Negative. Psychiatric/Behavioral:  Positive for sleep disturbance. Objective:   Physical Exam  /70 (Site: Left Upper Arm, Position: Sitting, Cuff Size: Medium Adult)   Pulse 99   Resp 16   Ht 5' 8\" (1.727 m)   Wt 125 lb 9.6 oz (57 kg)   SpO2 99%   BMI 19.10 kg/m²        Physical Exam  Constitutional:       General: Chel A Amy \"Marie\" is not in acute distress. HENT:      Head: Atraumatic. Mouth/Throat:      Comments: MP 1  Pulmonary:      Effort: No respiratory distress. Musculoskeletal:      Comments: Ambulates with assistive device   Neurological:      General: No focal deficit present. Mental Status: Chel Reyes \"Marie\" is alert. Psychiatric:         Mood and Affect: Mood normal.         Behavior: Behavior normal.           Sleep Medicine 11/23/2022   Sitting and reading 3   Watching TV 1   Sitting, inactive in a public place (e.g. a theatre or a meeting) 1   As a passenger in a car for an hour without a break 3   Lying down to rest in the afternoon when circumstances permit 2   Sitting and talking to someone 0   Sitting quietly after a lunch without alcohol 3   In a car, while stopped for a few minutes in traffic 0   [place] Sleepiness Score 13   Neck circumference (Inches) 12.5         SLEEP STUDY HISTORY      No specialty comments available. PERTINENT LAB RESULTS  Ferritin   Date Value Ref Range Status   06/04/2021 34 13 - 150 ug/L Final          Assessment:      Clarence Joshua was seen today for insomnia. Diagnoses and all orders for this visit:    Primary narcolepsy with cataplexy  -     Baseline Diagnostic Sleep Study; Future  -     Assessment of Daytime Sleepiness; Future    Christen-Danlos syndrome  -     Baseline Diagnostic Sleep Study; Future  -     Assessment of Daytime Sleepiness; Future    POTS (postural orthostatic tachycardia syndrome)    Other parasomnia    Other insomnia    Circadian rhythm sleep disorder, delayed sleep phase type     Plan:      There are symptoms suspicious for REM intrusion as well as parasomnias and possible cataplexy. We will get a PSG/MSLT to evaluate. Due to history of catatonia/suicidality will not have patient discontinue psych meds prior to study. 2. We will discuss sleep study results at our next visit     3. Suspect delayed sleep phase, melatonin caused sweating and using bright light therapy which does not seem to help, to continue to work on regulating sleep schedule    Patient will follow up Return in about 3 months (around 2/23/2023).     Yajaira Do,   Sleep Medicine   [facility] Phone -[phone], option 2  Fax- [phone]

## 2022-11-28 ENCOUNTER — TELEPHONE (OUTPATIENT)
Dept: SLEEP CENTER | Age: 22
End: 2022-11-28

## 2022-12-06 DIAGNOSIS — M54.32 SCIATICA OF LEFT SIDE: ICD-10-CM

## 2022-12-06 RX ORDER — GABAPENTIN 300 MG/1
CAPSULE ORAL
Qty: 180 CAPSULE | Refills: 0 | Status: SHIPPED | OUTPATIENT
Start: 2022-12-06 | End: 2023-03-06

## 2022-12-06 NOTE — TELEPHONE ENCOUNTER
Last Appointment:  11/23/2022  Future Appointments   Date Time Provider Nikhil Tuttlei   12/7/2022 12:00 PM SEB MEGHNA MRI [facility]   1/19/2023 10:30 AM Hayden Stockton MD [facility]   3/1/2023  1:00 PM DO OLIVIER Lincoln [facility] SLEEP [facility]   3/22/2023  9:30 PM Donna Mendez

## 2022-12-07 ENCOUNTER — HOSPITAL ENCOUNTER (OUTPATIENT)
Dept: MRI IMAGING | Age: 22
Discharge: HOME | End: 2022-12-09
Payer: COMMERCIAL

## 2022-12-07 DIAGNOSIS — G43.909 NONINTRACTABLE CHRONIC MIGRAINE: ICD-10-CM

## 2022-12-07 PROCEDURE — 70553 MRI BRAIN STEM W/O & W/DYE: CPT

## 2022-12-07 PROCEDURE — 6360000004 HC RX CONTRAST MEDICATION: Performed by: RADIOLOGY

## 2022-12-07 PROCEDURE — A9579 GAD-BASE MR CONTRAST NOS,1ML: HCPCS | Performed by: RADIOLOGY

## 2022-12-07 RX ADMIN — GADOTERIDOL 12 ML: 279.3 INJECTION, SOLUTION INTRAVENOUS at 12:43

## 2022-12-19 ENCOUNTER — TELEPHONE (OUTPATIENT)
Dept: INTERNAL MEDICINE | Age: 22
End: 2022-12-19

## 2022-12-19 NOTE — TELEPHONE ENCOUNTER
Called and notified patient of MRI results per Dr. Steve Altman. States will call neurology for appointment as they were waiting on MRI to be completed. Instructed a call back if any issues.

## 2022-12-19 NOTE — TELEPHONE ENCOUNTER
Please notify Gallo Zelaya that MRI with nothing acute. There is a small area that could be from her migraines. No change in management at this time. Also, a neurology referral was placed on 9/28/2022. I do not see an appointment yet. Please call to see if they are scheduling for new referrals.      Steven Murrell MD  12/19/2022

## 2023-01-06 DIAGNOSIS — F41.9 ANXIETY: ICD-10-CM

## 2023-01-06 DIAGNOSIS — N94.6 DYSMENORRHEA: ICD-10-CM

## 2023-01-06 DIAGNOSIS — G90.A POTS (POSTURAL ORTHOSTATIC TACHYCARDIA SYNDROME): ICD-10-CM

## 2023-01-06 DIAGNOSIS — N80.9 ENDOMETRIOSIS: ICD-10-CM

## 2023-01-06 DIAGNOSIS — Q79.60 EHLERS-DANLOS SYNDROME: ICD-10-CM

## 2023-01-06 DIAGNOSIS — F48.9 MOOD PROBLEM: ICD-10-CM

## 2023-01-06 DIAGNOSIS — G95.0 SYRINGOMYELIA (HCC): ICD-10-CM

## 2023-01-06 DIAGNOSIS — F32.A DEPRESSION, UNSPECIFIED DEPRESSION TYPE: ICD-10-CM

## 2023-01-06 RX ORDER — BUSPIRONE HYDROCHLORIDE 10 MG/1
TABLET ORAL
Qty: 60 TABLET | Refills: 5 | OUTPATIENT
Start: 2023-01-06

## 2023-01-06 RX ORDER — UBROGEPANT 50 MG/1
50 TABLET ORAL DAILY PRN
Qty: 30 TABLET | Refills: 0 | Status: SHIPPED | OUTPATIENT
Start: 2023-01-06

## 2023-01-11 ENCOUNTER — PATIENT MESSAGE (OUTPATIENT)
Dept: INTERNAL MEDICINE | Age: 23
End: 2023-01-11

## 2023-01-11 RX ORDER — AMOXICILLIN 500 MG/1
500 CAPSULE ORAL 2 TIMES DAILY
Qty: 10 CAPSULE | Refills: 0 | Status: SHIPPED | OUTPATIENT
Start: 2023-01-11 | End: 2023-01-16

## 2023-01-11 NOTE — TELEPHONE ENCOUNTER
Rest of amoxicillin therapy prescribed to patient pharmacy. If no improvement in symptoms, can see patient on Monday for further evaluation.      Petra Rodas MD  1/11/2023

## 2023-01-11 NOTE — TELEPHONE ENCOUNTER
1/11/23 1227 spoke with patient who states she tested positive for Strep A at [facility] urgent care in the [facility]" but was not specific on which one. Patient informed Dr. Timur Lane will be giving her an additional 5 days of Amoxicillin 500 mg BID. Patient further states she now has an earache.   Joselito Conner LPN

## 2023-01-11 NOTE — TELEPHONE ENCOUNTER
From: Agustin Adkins  To: Dr. Rudy Siegel  Sent: 1/11/2023 12:37 AM EST  Subject: Kassy Persaud, I was recommended to bring this up to you by my mother in law (a physician): I came down with strep, had severe headaches for two days then just woke up and had lost my voice with severe pain in my throat, and I went to urgent care and was tested and given 5 days of amoxicillin, 500mg tid. I have completed 4 days and am still feeling sick, I can speak but my throat still hurts and I've had a rattling cough that comes with sharp chest pain, and my headache is persistent. My cough was dry for the first two days. My mother in law was concerned about me only getting 5 days of amoxicillin and not 10, especially when I'm still symptomatic with only about 30% improvement, and said I should message you letting you know these things.        Thanks, [employer]

## 2023-01-17 DIAGNOSIS — G90.A POTS (POSTURAL ORTHOSTATIC TACHYCARDIA SYNDROME): ICD-10-CM

## 2023-01-17 DIAGNOSIS — G95.0 SYRINGOMYELIA (HCC): ICD-10-CM

## 2023-01-17 DIAGNOSIS — N80.9 ENDOMETRIOSIS: ICD-10-CM

## 2023-01-17 DIAGNOSIS — Q79.60 EHLERS-DANLOS SYNDROME: ICD-10-CM

## 2023-01-17 DIAGNOSIS — F32.A DEPRESSION, UNSPECIFIED DEPRESSION TYPE: ICD-10-CM

## 2023-01-17 DIAGNOSIS — F41.9 ANXIETY: ICD-10-CM

## 2023-01-17 DIAGNOSIS — F48.9 MOOD PROBLEM: ICD-10-CM

## 2023-01-17 DIAGNOSIS — N94.6 DYSMENORRHEA: ICD-10-CM

## 2023-01-17 RX ORDER — BUSPIRONE HYDROCHLORIDE 10 MG/1
TABLET ORAL
Qty: 60 TABLET | Refills: 5 | OUTPATIENT
Start: 2023-01-17

## 2023-01-17 NOTE — TELEPHONE ENCOUNTER
Called and spoke with pharmacy and states she has refills. Patient notified and aware of follow up this Thursday. No needs voiced.

## 2023-01-19 ENCOUNTER — TELEPHONE (OUTPATIENT)
Dept: INTERNAL MEDICINE | Age: 23
End: 2023-01-19

## 2023-01-24 NOTE — PROGRESS NOTES
[facility]      SUBJECTIVE:  Darrell Lawson (:  2000) is a 25 y.o. adult here for evaluation of the following chief complaint(s):  Follow-up and Ear Fullness  Continues to have some ear fullness after a recent URI. Continue loratadine   Start fluticasone nasal spray. Sleep difficulty - ongoing. Saw Dr. Claudia Scales. A sleep study was ordered as primary narcolepsy with cataplexy was suspected. MRI was completed which was essentially unremarkable. Follow-up appointment is scheduled for 3/1/2023 with a sleep study on 3/22/2023. Need to see if sleep study can be moved up prior to the scheduled office visit. Marie would like the prescription for this if unable to be moved, they will change the appointment by themselves at another facility. Migraine - Referred to neuro but Marie needed an MRI prior to an appointment being scheduled. On Ubrelvy 50 mg daily PRN for these. Marie will call neuro and if there are issues, we will call to assist with scheduling. Continue Ubrelvy for now. Dysparuenia - patient to follow up with plastic surgery for further treatment recommendations. Was referred to Dr. Abdirahman Campos for this. Further treatment as per patient. Ehler-Danlos syndrome with hip pain - Had been noting clicking and pain in the L hip. Was to reschedule with her regular orthopedic provider in [address]. Recent flair of symptoms which required them to use crutches. Has not been to see her ortho. Continue orthopedic follow-up. Tachycardia - a 2-D Echo was ordered on 2023. Not yet scheduled. Will likely need a cardiology referral +/- a B-blocker as well. Await 2-D echo and then refer to cardiology    POTS syndrome -  on Fludrocortisone. No new symptoms of lightheadedness. Continue fludrocortisone   Check CMP. Anxiety/Depression - on duloxetine, lamotrigine and buspirone. These have kept Marie stable.  Was out of buspar over a month. Now back on for a week. Decreased motivation and a described psychological \"heaviness. \"  Reports that his  is being switched from one anti-depressant to another. Currently, without a counselor. Refer to Gloria Dockery, [address]. Continue present medications. Muscle spasms secondary to Ehler-Danlos - on baclofen   Continue same. Had DEXA scan in [facility] - will need to get this to further assess need for additional testing/treatment though [facility] states this is normal.      Review of Systems as above. Current Outpatient Medications on File Prior to Visit   Medication Sig Dispense Refill    norethindrone (MICRONOR) 0.35 MG tablet Take 0.35 mg by mouth daily      Ubrogepant (UBRELVY) 50 MG TABS Take 50 mg by mouth daily as needed (Migraine) May repeat dose after 2 hours if headache is still present. Do not exceed 2 tablets in 24 hours. 30 tablet 0    gabapentin (NEURONTIN) 300 MG capsule take 1 capsule by mouth twice a day 180 capsule 0    estradiol (ESTRACE VAGINAL) 0.1 MG/GM vaginal cream Place 0.5 g vaginally See Admin Instructions Apply 0.5 g topically to vulva each night. 45 g 0    busPIRone (BUSPAR) 10 MG tablet take 1 tablet by mouth twice a day 60 tablet 5    lamoTRIgine (LAMICTAL) 100 MG tablet Take 1 tablet by mouth daily Indications: Mood Disorder take 1 tablet by mouth once daily 90 tablet 1    DULoxetine (CYMBALTA) 30 MG extended release capsule take 1 capsule by mouth twice a day 180 capsule 5    celecoxib (CELEBREX) 200 MG capsule Takes as needed      lidocaine (XYLOCAINE) 5 % ointment Apply topically as needed.  30 g 1    hydrocortisone (ANUSOL-HC) 25 MG suppository Place 1 suppository rectally 2 times daily as needed for Hemorrhoids 12 suppository 1    fludrocortisone (FLORINEF) 0.1 MG tablet take 1 tablet by mouth once daily (Patient taking differently: Take 0.1 mg by mouth daily Indications: Postural Orthostatic Tachycardia take 1 tablet by mouth once daily) 90 tablet 3    famotidine (PEPCID) 20 MG tablet Take 20 mg by mouth 2 times daily Taking as needed before ibuprofen      Levonorgestrel [facility]) IUD 19.5 mg 19.5 each by IntraUTERine route Dec 2020 inserted, remove 5 years  Manage endometriosis, not confirmed      Cholecalciferol 50 MCG (2000 UT) TABS Take 2,000 Units by mouth nightly      loratadine (CLARITIN) 10 MG capsule Take 10 mg by mouth daily Indications: Allergic Rhinitis        No current facility-administered medications on file prior to visit. OBJECTIVE:    VS:   Vitals:    01/25/23 0804   BP: 115/71   Site: Right Upper Arm   Position: Sitting   Cuff Size: Medium Adult   Pulse: 99   Resp: 18   Temp: 97.9 °F (36.6 °C)   TempSrc: Temporal   SpO2: 98%   Weight: 129 lb (58.5 kg)   Height: 5' 8\" (1.727 m)     Physical Exam   HEENT:  B TMs with fluid present. No purulence or erythema noted. Mouth without erythema or exudate. Lungs:  CTA B  Neck:   No carotid bruits appreciated B.   CVS:  +s1/s2 without m/g/r appreciated. Abd:  + BS, NTND, No renal or aortic bruits   Extr:  2+ DP/PT pulses B, no pitting edema    RTC:  Return in about 2 months (around 3/25/2023).     Solitario Saldaña MD   1/25/2023 8:47 AM

## 2023-01-25 ENCOUNTER — OFFICE VISIT (OUTPATIENT)
Dept: INTERNAL MEDICINE | Age: 23
End: 2023-01-25
Payer: COMMERCIAL

## 2023-01-25 VITALS
BODY MASS INDEX: 19.55 KG/M2 | HEIGHT: 68 IN | TEMPERATURE: 97.9 F | WEIGHT: 129 LBS | HEART RATE: 99 BPM | SYSTOLIC BLOOD PRESSURE: 115 MMHG | OXYGEN SATURATION: 98 % | RESPIRATION RATE: 18 BRPM | DIASTOLIC BLOOD PRESSURE: 71 MMHG

## 2023-01-25 DIAGNOSIS — F39 MOOD DISORDER (HCC): ICD-10-CM

## 2023-01-25 DIAGNOSIS — K59.09 CHRONIC CONSTIPATION: ICD-10-CM

## 2023-01-25 DIAGNOSIS — G90.A POTS (POSTURAL ORTHOSTATIC TACHYCARDIA SYNDROME): Primary | ICD-10-CM

## 2023-01-25 DIAGNOSIS — F32.A DEPRESSION, UNSPECIFIED DEPRESSION TYPE: ICD-10-CM

## 2023-01-25 DIAGNOSIS — F41.9 ANXIETY: ICD-10-CM

## 2023-01-25 DIAGNOSIS — N92.1 MENORRHAGIA WITH IRREGULAR CYCLE: ICD-10-CM

## 2023-01-25 DIAGNOSIS — M62.838 MUSCLE SPASM: ICD-10-CM

## 2023-01-25 DIAGNOSIS — N94.6 DYSMENORRHEA: ICD-10-CM

## 2023-01-25 DIAGNOSIS — M25.50 ARTHRALGIA, UNSPECIFIED JOINT: ICD-10-CM

## 2023-01-25 DIAGNOSIS — Q79.62 HYPERMOBILE EHLERS-DANLOS SYNDROME: ICD-10-CM

## 2023-01-25 PROCEDURE — G8427 DOCREV CUR MEDS BY ELIG CLIN: HCPCS | Performed by: INTERNAL MEDICINE

## 2023-01-25 PROCEDURE — 1036F TOBACCO NON-USER: CPT | Performed by: INTERNAL MEDICINE

## 2023-01-25 PROCEDURE — 99213 OFFICE O/P EST LOW 20 MIN: CPT | Performed by: INTERNAL MEDICINE

## 2023-01-25 PROCEDURE — G8420 CALC BMI NORM PARAMETERS: HCPCS | Performed by: INTERNAL MEDICINE

## 2023-01-25 PROCEDURE — 99214 OFFICE O/P EST MOD 30 MIN: CPT | Performed by: INTERNAL MEDICINE

## 2023-01-25 PROCEDURE — G8482 FLU IMMUNIZE ORDER/ADMIN: HCPCS | Performed by: INTERNAL MEDICINE

## 2023-01-25 RX ORDER — BACLOFEN 10 MG/1
10 TABLET ORAL 2 TIMES DAILY
Qty: 180 TABLET | Refills: 1 | Status: SHIPPED | OUTPATIENT
Start: 2023-01-25

## 2023-01-25 RX ORDER — ACETAMINOPHEN AND CODEINE PHOSPHATE 120; 12 MG/5ML; MG/5ML
0.35 SOLUTION ORAL DAILY
COMMUNITY
Start: 2019-11-27

## 2023-01-25 RX ORDER — FLUTICASONE PROPIONATE 50 MCG
1 SPRAY, SUSPENSION (ML) NASAL DAILY
Qty: 16 G | Refills: 2 | Status: SHIPPED | OUTPATIENT
Start: 2023-01-25

## 2023-01-25 ASSESSMENT — PATIENT HEALTH QUESTIONNAIRE - PHQ9
SUM OF ALL RESPONSES TO PHQ9 QUESTIONS 1 & 2: 5
5. POOR APPETITE OR OVEREATING: 0
1. LITTLE INTEREST OR PLEASURE IN DOING THINGS: 3
10. IF YOU CHECKED OFF ANY PROBLEMS, HOW DIFFICULT HAVE THESE PROBLEMS MADE IT FOR YOU TO DO YOUR WORK, TAKE CARE OF THINGS AT HOME, OR GET ALONG WITH OTHER PEOPLE: 3
6. FEELING BAD ABOUT YOURSELF - OR THAT YOU ARE A FAILURE OR HAVE LET YOURSELF OR YOUR FAMILY DOWN: 1
SUM OF ALL RESPONSES TO PHQ QUESTIONS 1-9: 18
3. TROUBLE FALLING OR STAYING ASLEEP: 3
SUM OF ALL RESPONSES TO PHQ QUESTIONS 1-9: 18
8. MOVING OR SPEAKING SO SLOWLY THAT OTHER PEOPLE COULD HAVE NOTICED. OR THE OPPOSITE, BEING SO FIGETY OR RESTLESS THAT YOU HAVE BEEN MOVING AROUND A LOT MORE THAN USUAL: 3
2. FEELING DOWN, DEPRESSED OR HOPELESS: 2
SUM OF ALL RESPONSES TO PHQ QUESTIONS 1-9: 18
4. FEELING TIRED OR HAVING LITTLE ENERGY: 3
9. THOUGHTS THAT YOU WOULD BE BETTER OFF DEAD, OR OF HURTING YOURSELF: 0
SUM OF ALL RESPONSES TO PHQ QUESTIONS 1-9: 18
7. TROUBLE CONCENTRATING ON THINGS, SUCH AS READING THE NEWSPAPER OR WATCHING TELEVISION: 3

## 2023-01-25 NOTE — PROGRESS NOTES
The following care gaps have been identified:  Hepatitis A  Hepatitis B  Varicella  Covid #4 - patient encouraged to obtain  Milagro Streeter LPN

## 2023-01-26 ENCOUNTER — TELEPHONE (OUTPATIENT)
Dept: INTERNAL MEDICINE | Age: 23
End: 2023-01-26

## 2023-01-26 NOTE — TELEPHONE ENCOUNTER
MARCIEW left message for pt related to referral. LISW provided contact information for return call.

## 2023-02-09 ENCOUNTER — TELEPHONE (OUTPATIENT)
Dept: INTERNAL MEDICINE | Age: 23
End: 2023-02-09

## 2023-02-16 ENCOUNTER — OFFICE VISIT (OUTPATIENT)
Dept: SURGERY | Age: 23
End: 2023-02-16

## 2023-02-16 VITALS
BODY MASS INDEX: 19.55 KG/M2 | SYSTOLIC BLOOD PRESSURE: 118 MMHG | WEIGHT: 129 LBS | RESPIRATION RATE: 16 BRPM | DIASTOLIC BLOOD PRESSURE: 74 MMHG | HEART RATE: 109 BPM | HEIGHT: 68 IN

## 2023-02-16 DIAGNOSIS — K60.2 ANAL FISSURE: Primary | ICD-10-CM

## 2023-02-16 NOTE — PATIENT INSTRUCTIONS
High fiber diet - 20-25 grams of fiber a day  Miralax 1 cap daily  Tucks wipes  Anal hygiene  Preparation H/anal cream  Sitz baths as needed  Bidet attachment for toilet

## 2023-02-16 NOTE — PROGRESS NOTES
General Surgery History and Physical    Patient's Name/Date of Birth: Fay Sanchez / 2000    Date: 2/16/2023    PCP: Serg Lowry MD    Referring Physician:   Zahraa Marley MD  [phone]    CHIEF COMPLAINT:    Chief Complaint   Patient presents with    New Patient    Hemorrhoids    Rectal Bleeding         HISTORY OF PRESENT ILLNESS:    Fay Sanchez is an 25 y.o. adult who presents with rectal bleeding and what she believes are hemorrhoids. She said she has constipation sometimes. She doesn't think she pushes a lot of spends a lot of time on the toilet. She is lactose intolerant and controls this with diet and Lactaid products. She doesn't feel like anything is popping out. She sometimes has burning after a BM. She has been using steroid suppositories and lidocaine cream without much relief. No changes in stool caliber. No abdominal pain. No unintentional weight loss.      Past Medical History:   Past Medical History:   Diagnosis Date    Anxiety     Autism spectrum disorder 2019    Bipolar disorder ([address]     Depression     Dysmenorrhea     Christen-Danlos syndrome type III 2019    GERD (gastroesophageal reflux disease)     Headache     Menorrhagia     Migraine with aura     Postural orthostatic tachycardia syndrome 2014    Syringomyelia ([address] 2019    Urinary incontinence         Past Surgical History:   Past Surgical History:   Procedure Laterality Date    CHOLECYSTECTOMY      HIP SURGERY      WISDOM TOOTH EXTRACTION  2019        Allergies: Reglan [metoclopramide], Abilify [aripiprazole], and Aloe vera     Medications:   Current Outpatient Medications   Medication Sig Dispense Refill    baclofen (LIORESAL) 10 MG tablet Take 1 tablet by mouth 2 times daily take 1 tablet by mouth twice a day 180 tablet 1    fluticasone (FLONASE) 50 MCG/ACT nasal spray 1 spray by Each Nostril route daily 16 g 2    Ubrogepant (UBRELVY) 50 MG TABS Take 50 mg by mouth daily as needed (Migraine) May repeat dose after 2 hours if headache is still present. Do not exceed 2 tablets in 24 hours. 30 tablet 0    gabapentin (NEURONTIN) 300 MG capsule take 1 capsule by mouth twice a day 180 capsule 0    estradiol (ESTRACE VAGINAL) 0.1 MG/GM vaginal cream Place 0.5 g vaginally See Admin Instructions Apply 0.5 g topically to vulva each night. 45 g 0    busPIRone (BUSPAR) 10 MG tablet take 1 tablet by mouth twice a day 60 tablet 5    lamoTRIgine (LAMICTAL) 100 MG tablet Take 1 tablet by mouth daily Indications: Mood Disorder take 1 tablet by mouth once daily 90 tablet 1    DULoxetine (CYMBALTA) 30 MG extended release capsule take 1 capsule by mouth twice a day 180 capsule 5    celecoxib (CELEBREX) 200 MG capsule Takes as needed      hydrocortisone (ANUSOL-HC) 25 MG suppository Place 1 suppository rectally 2 times daily as needed for Hemorrhoids 12 suppository 1    fludrocortisone (FLORINEF) 0.1 MG tablet take 1 tablet by mouth once daily (Patient taking differently: Take 0.1 mg by mouth daily Indications: Postural Orthostatic Tachycardia take 1 tablet by mouth once daily) 90 tablet 3    famotidine (PEPCID) 20 MG tablet Take 20 mg by mouth 2 times daily Taking as needed before ibuprofen      Levonorgestrel [facility]) IUD 19.5 mg 19.5 each by IntraUTERine route Dec 2020 inserted, remove 5 years  Manage endometriosis, not confirmed      Cholecalciferol 50 MCG (2000 UT) TABS Take 4,000 Units by mouth nightly      loratadine (CLARITIN) 10 MG capsule Take 10 mg by mouth daily Indications: Allergic Rhinitis       lidocaine (XYLOCAINE) 5 % ointment Apply topically as needed. (Patient not taking: Reported on 2/16/2023) 30 g 1     No current facility-administered medications for this visit.          Social History:   Social History     Tobacco Use    Smoking status: Never    Smokeless tobacco: Never   Substance Use Topics    Alcohol use: Never        Family History:   Family History   Problem Relation Age of Onset    Heart Disease Mother     Other Mother         Spinocerebellar ataxia    Mental Illness Mother     Miscarriages / [place] Mother     Depression Mother     High Cholesterol Mother     Mental Illness Father     Depression Father     Heart Disease Sister     Mental Illness Brother     Learning Disabilities Brother     Dementia Maternal Grandmother     Heart Attack Maternal Grandfather     Heart Disease Maternal Grandfather     High Cholesterol Maternal Grandfather     Alcohol Abuse Maternal Grandfather     Heart Attack Paternal Grandfather     Heart Disease Paternal Grandfather     Heart Attack Maternal Uncle     Heart Disease Maternal Uncle     Mental Illness Paternal Aunt     Substance Abuse Paternal Aunt     Mental Illness Paternal Uncle     Substance Abuse Paternal Uncle     Alcohol Abuse Paternal Uncle     Substance Abuse Paternal Cousin        REVIEW OF SYSTEMS:    Constitutional: negative  Eyes: negative  Ears, nose, mouth, throat, and face: negative  Respiratory: negative  Cardiovascular: negative  Gastrointestinal: as in HPI  Genitourinary:negative  Integument/breast: negative  Hematologic/lymphatic: negative  Musculoskeletal:negative  Neurological: negative  Allergic/Immunologic: negative    PHYSICAL EXAM   /74   Pulse (!) 109   Resp 16   Ht 5' 8\" (1.727 m)   Wt 129 lb (58.5 kg)   BMI 19.61 kg/m²     General appearance: alert, cooperative and in no acute distress. Eyes: Grossly normal   Lungs: normal work of breathing  Heart: regular rate  Abdomen:  soft, non-tender, non-distended  Skin: No skin abnormalities  Neurologic: Alert and oriented x 3. Grossly normal  Musculoskeletal: No edema.       ASSESSMENT AND PLAN:     Kye Sanchez is an 25 y.o. adult who presents with constipation, anal fissure    High fiber diet - 20-25 grams of fiber a day  Miralax 1 cap daily  Tucks wipes  Anal hygiene  Preparation H/anal cream  Sitz baths as needed  Bidet attachment for toilet     May need Botox injection    Physician Signature: Electronically signed by Rowdy Daniels MD, General Surgery    Send copy of H&P to PCP, Serg Lowry MD and referring physician, Zahraa Marley MD

## 2023-03-13 ENCOUNTER — TELEPHONE (OUTPATIENT)
Dept: INTERNAL MEDICINE | Age: 23
End: 2023-03-13

## 2023-03-13 NOTE — TELEPHONE ENCOUNTER
JOIE made contact to pt, pt reports they live in [place] but would like a referral in [facility] area. JOIE to meet pt in person 3/30 when they see Dr. Raquel Cintron. JOIE to meet with pt to discuss resources.

## 2023-03-29 DIAGNOSIS — N80.9 ENDOMETRIOSIS: ICD-10-CM

## 2023-03-29 DIAGNOSIS — G90.A POTS (POSTURAL ORTHOSTATIC TACHYCARDIA SYNDROME): ICD-10-CM

## 2023-03-29 DIAGNOSIS — Q79.60 EHLERS-DANLOS SYNDROME: ICD-10-CM

## 2023-03-29 DIAGNOSIS — F32.A DEPRESSION, UNSPECIFIED DEPRESSION TYPE: ICD-10-CM

## 2023-03-29 DIAGNOSIS — N94.6 DYSMENORRHEA: ICD-10-CM

## 2023-03-29 DIAGNOSIS — F41.9 ANXIETY: ICD-10-CM

## 2023-03-29 DIAGNOSIS — F48.9 MOOD PROBLEM: ICD-10-CM

## 2023-03-29 DIAGNOSIS — G95.0 SYRINGOMYELIA (HCC): ICD-10-CM

## 2023-03-29 RX ORDER — LAMOTRIGINE 100 MG/1
TABLET ORAL
Qty: 90 TABLET | Refills: 1 | OUTPATIENT
Start: 2023-03-29

## 2023-03-29 NOTE — PROGRESS NOTES
preoperative evaluation. I have ordered these labs which include a CBC CMP albumin fructosamine, hemoglobin A1c as well as vitamin D level. Will forward this note to orthopedic provider in preparation for surgery. Saw Dr. Glory Moreno on 2/16/2023 for treatment of constipation and an anal fissure. She was recommended for a high fiber diet, MiraLAX 1 capsule daily Tucks wipes, as well as sitz bath's. She is to follow-up later this morning and may need Botox injections for this issue. Management as per general surgery. Reyes Hench is to talk with JOIE Muhammad for referral for psychiatry in the [facility] area. Will have [facility]. Review of Systems   Respiratory:  Negative for chest tightness and shortness of breath. Cardiovascular:  Positive for palpitations (occasional). Negative for chest pain and leg swelling. Gastrointestinal:  Negative for abdominal pain. Neurological:  Positive for dizziness (see HPI). Current Outpatient Medications on File Prior to Visit   Medication Sig Dispense Refill    baclofen (LIORESAL) 10 MG tablet Take 1 tablet by mouth 2 times daily take 1 tablet by mouth twice a day 180 tablet 1    fluticasone (FLONASE) 50 MCG/ACT nasal spray 1 spray by Each Nostril route daily 16 g 2    Ubrogepant (UBRELVY) 50 MG TABS Take 50 mg by mouth daily as needed (Migraine) May repeat dose after 2 hours if headache is still present. Do not exceed 2 tablets in 24 hours. 30 tablet 0    estradiol (ESTRACE VAGINAL) 0.1 MG/GM vaginal cream Place 0.5 g vaginally See Admin Instructions Apply 0.5 g topically to vulva each night.  45 g 0    busPIRone (BUSPAR) 10 MG tablet take 1 tablet by mouth twice a day 60 tablet 5    lamoTRIgine (LAMICTAL) 100 MG tablet Take 1 tablet by mouth daily Indications: Mood Disorder take 1 tablet by mouth once daily 90 tablet 1    DULoxetine (CYMBALTA) 30 MG extended release capsule take 1 capsule by mouth twice a day 180 capsule 5    celecoxib

## 2023-03-30 ENCOUNTER — HOSPITAL ENCOUNTER (OUTPATIENT)
Age: 23
Discharge: HOME | End: 2023-03-30
Payer: COMMERCIAL

## 2023-03-30 ENCOUNTER — OFFICE VISIT (OUTPATIENT)
Dept: INTERNAL MEDICINE | Age: 23
End: 2023-03-30
Payer: COMMERCIAL

## 2023-03-30 ENCOUNTER — OFFICE VISIT (OUTPATIENT)
Dept: SURGERY | Age: 23
End: 2023-03-30
Payer: COMMERCIAL

## 2023-03-30 ENCOUNTER — PATIENT MESSAGE (OUTPATIENT)
Dept: INTERNAL MEDICINE | Age: 23
End: 2023-03-30

## 2023-03-30 VITALS
HEART RATE: 99 BPM | HEIGHT: 68 IN | OXYGEN SATURATION: 99 % | TEMPERATURE: 97.2 F | SYSTOLIC BLOOD PRESSURE: 116 MMHG | WEIGHT: 129 LBS | BODY MASS INDEX: 19.55 KG/M2 | DIASTOLIC BLOOD PRESSURE: 77 MMHG | RESPIRATION RATE: 18 BRPM

## 2023-03-30 VITALS
WEIGHT: 129 LBS | HEART RATE: 92 BPM | SYSTOLIC BLOOD PRESSURE: 118 MMHG | BODY MASS INDEX: 19.55 KG/M2 | DIASTOLIC BLOOD PRESSURE: 74 MMHG | HEIGHT: 68 IN

## 2023-03-30 DIAGNOSIS — R00.0 TACHYCARDIA: Primary | ICD-10-CM

## 2023-03-30 DIAGNOSIS — K60.2 ANAL FISSURE: Primary | ICD-10-CM

## 2023-03-30 DIAGNOSIS — G90.A POTS (POSTURAL ORTHOSTATIC TACHYCARDIA SYNDROME): ICD-10-CM

## 2023-03-30 DIAGNOSIS — Z01.818 PRE-OPERATIVE EXAM: ICD-10-CM

## 2023-03-30 DIAGNOSIS — Q79.60 EHLERS-DANLOS SYNDROME: ICD-10-CM

## 2023-03-30 LAB
ALBUMIN SERPL-MCNC: 4.7 G/DL (ref 3.5–5.2)
ALP SERPL-CCNC: 68 U/L (ref 35–104)
ALT SERPL-CCNC: 14 U/L (ref 0–32)
ANION GAP SERPL CALCULATED.3IONS-SCNC: 8 MMOL/L (ref 7–16)
AST SERPL-CCNC: 15 U/L (ref 0–31)
BASOPHILS # BLD: 0.04 E9/L (ref 0–0.2)
BASOPHILS NFR BLD: 0.7 % (ref 0–2)
BILIRUB SERPL-MCNC: 0.5 MG/DL (ref 0–1.2)
BUN SERPL-MCNC: 10 MG/DL (ref 6–20)
CALCIUM SERPL-MCNC: 9.7 MG/DL (ref 8.6–10.2)
CHLORIDE SERPL-SCNC: 102 MMOL/L (ref 98–107)
CO2 SERPL-SCNC: 29 MMOL/L (ref 22–29)
CREAT SERPL-MCNC: 0.6 MG/DL (ref 0.5–1)
EOSINOPHIL # BLD: 0.08 E9/L (ref 0.05–0.5)
EOSINOPHIL NFR BLD: 1.4 % (ref 0–6)
ERYTHROCYTE [DISTWIDTH] IN BLOOD BY AUTOMATED COUNT: 11.9 FL (ref 11.5–15)
GLUCOSE SERPL-MCNC: 90 MG/DL (ref 74–99)
HBA1C MFR BLD: 5.1 % (ref 4–5.6)
HCT VFR BLD AUTO: 42.6 % (ref 34–48)
HGB BLD-MCNC: 13.6 G/DL (ref 11.5–15.5)
IMM GRANULOCYTES # BLD: 0.02 E9/L
IMM GRANULOCYTES NFR BLD: 0.4 % (ref 0–5)
INR BLD: 1.1
LYMPHOCYTES # BLD: 2.27 E9/L (ref 1.5–4)
LYMPHOCYTES NFR BLD: 39.8 % (ref 20–42)
MCH RBC QN AUTO: 28.6 PG (ref 26–35)
MCHC RBC AUTO-ENTMCNC: 31.9 % (ref 32–34.5)
MCV RBC AUTO: 89.5 FL (ref 80–99.9)
MONOCYTES # BLD: 0.41 E9/L (ref 0.1–0.95)
MONOCYTES NFR BLD: 7.2 % (ref 2–12)
NEUTROPHILS # BLD: 2.89 E9/L (ref 1.8–7.3)
NEUTS SEG NFR BLD: 50.5 % (ref 43–80)
PLATELET # BLD AUTO: 394 E9/L (ref 130–450)
PMV BLD AUTO: 8.8 FL (ref 7–12)
POTASSIUM SERPL-SCNC: 4.2 MMOL/L (ref 3.5–5)
PROT SERPL-MCNC: 7.6 G/DL (ref 6.4–8.3)
PROTHROMBIN TIME: 12.2 SEC (ref 9.3–12.4)
RBC # BLD AUTO: 4.76 E12/L (ref 3.5–5.5)
SODIUM SERPL-SCNC: 139 MMOL/L (ref 132–146)
TSH SERPL-MCNC: 2.29 UIU/ML (ref 0.27–4.2)
VITAMIN D 25-HYDROXY: 52 NG/ML (ref 30–100)
WBC # BLD: 5.7 E9/L (ref 4.5–11.5)

## 2023-03-30 PROCEDURE — 36415 COLL VENOUS BLD VENIPUNCTURE: CPT

## 2023-03-30 PROCEDURE — 1036F TOBACCO NON-USER: CPT | Performed by: SURGERY

## 2023-03-30 PROCEDURE — 99214 OFFICE O/P EST MOD 30 MIN: CPT | Performed by: INTERNAL MEDICINE

## 2023-03-30 PROCEDURE — 99213 OFFICE O/P EST LOW 20 MIN: CPT | Performed by: SURGERY

## 2023-03-30 PROCEDURE — 85025 COMPLETE CBC W/AUTO DIFF WBC: CPT

## 2023-03-30 PROCEDURE — G8427 DOCREV CUR MEDS BY ELIG CLIN: HCPCS | Performed by: INTERNAL MEDICINE

## 2023-03-30 PROCEDURE — G8482 FLU IMMUNIZE ORDER/ADMIN: HCPCS | Performed by: SURGERY

## 2023-03-30 PROCEDURE — 82306 VITAMIN D 25 HYDROXY: CPT

## 2023-03-30 PROCEDURE — 1036F TOBACCO NON-USER: CPT | Performed by: INTERNAL MEDICINE

## 2023-03-30 PROCEDURE — G8427 DOCREV CUR MEDS BY ELIG CLIN: HCPCS | Performed by: SURGERY

## 2023-03-30 PROCEDURE — G8420 CALC BMI NORM PARAMETERS: HCPCS | Performed by: INTERNAL MEDICINE

## 2023-03-30 PROCEDURE — G8420 CALC BMI NORM PARAMETERS: HCPCS | Performed by: SURGERY

## 2023-03-30 PROCEDURE — 99213 OFFICE O/P EST LOW 20 MIN: CPT | Performed by: INTERNAL MEDICINE

## 2023-03-30 PROCEDURE — 84443 ASSAY THYROID STIM HORMONE: CPT

## 2023-03-30 PROCEDURE — G8482 FLU IMMUNIZE ORDER/ADMIN: HCPCS | Performed by: INTERNAL MEDICINE

## 2023-03-30 PROCEDURE — 83036 HEMOGLOBIN GLYCOSYLATED A1C: CPT

## 2023-03-30 PROCEDURE — 85610 PROTHROMBIN TIME: CPT

## 2023-03-30 PROCEDURE — 82985 ASSAY OF GLYCATED PROTEIN: CPT

## 2023-03-30 PROCEDURE — 80053 COMPREHEN METABOLIC PANEL: CPT

## 2023-03-30 ASSESSMENT — ENCOUNTER SYMPTOMS
ABDOMINAL PAIN: 0
SHORTNESS OF BREATH: 0
CHEST TIGHTNESS: 0

## 2023-03-30 NOTE — PROGRESS NOTES
[facility] Note - Follow up    Patient's Name/Date of Birth: Franklyn Turner / 2000    Date: 3/30/2023    PCP: Becky Steen MD    Referring Physician:   Annabel Johnson MD  [phone]    Chief Complaint   Patient presents with    Anal Fissure     5 week fl/u       HPI:    The patient said her constipation has improved. She is taking daily Miralax. She said she sometimes has pain but overall it is much better. No bleeding. Patient's medications, allergies, past medical, surgical, social and family histories were reviewed and updated as appropriate. Review of Systems  Constitutional: negative  Respiratory: negative  Cardiovascular: negative  Gastrointestinal: as in hpi  Genitourinary:negative  Integument/breast: negative    Physical Exam:  /74   Pulse 92   Ht 5' 8\" (1.727 m)   Wt 129 lb (58.5 kg)   BMI 19.61 kg/m²   General appearance: alert, cooperative and in no acute distress. Lungs: normal work of breathing  Heart: regular rate  Abdomen: soft, nontender, nondistended  Musculoskeletal: symmetrical without edema.   Skin: healed anal fissure     Impression/Plan:  25y.o. year old adult with anal fissure, constipation    High fiber diet - 20-25 grams of fiber a day  Miralax 1 cap daily  Tucks wipes  Anal hygiene  Preparation H/anal cream  Sitz baths as needed  Bidet attachment for toilet      May need Botox injection if she has recurrence     Follow up if she has recurrence of symptoms      Electronically by Hawa Macias MD, General Surgery  on 3/30/2023 at 10:27 AM      Send copy of H&P to PCP, Becky Steen MD and referring physician, Annabel Johnson MD      3/30/2023

## 2023-03-31 ENCOUNTER — TELEPHONE (OUTPATIENT)
Dept: INTERNAL MEDICINE | Age: 23
End: 2023-03-31

## 2023-03-31 RX ORDER — OMEPRAZOLE 40 MG/1
40 CAPSULE, DELAYED RELEASE ORAL
Qty: 30 CAPSULE | Refills: 3 | Status: SHIPPED | OUTPATIENT
Start: 2023-03-31

## 2023-03-31 NOTE — TELEPHONE ENCOUNTER
From: Kati Loew  To: Dr. Edgar Lopes  Sent: 3/30/2023 10:58 AM EDT  Subject: Forgotten question    Hi, I forgot to mention at my appointment, the Celebrex I need for my hip pain has been really irritating my stomach, I take pepcid 45 minutes before hand and then take with food but frankly I am still having a gnawing pain in my stomach which makes me nauseous. Even on days I haven't taken it, I've been having the stomach pain which isn't as bad if I've just eaten. I worry I might be starting to get another ulcer and want to prevent that, but Celebrex is necessary for my hip pain. What are some better ways to protect my stomach from NSAIDs? Would omeprazol be a better option to take before my celebrex? Frankly I would love some sucralfate, had that last time I had an ulcer. That stuff was very effective. They made me take omeprazol too, but I'm just trying to not mess up stomach before my surgery.     Thanks, [employer]

## 2023-03-31 NOTE — TELEPHONE ENCOUNTER
LISW contact pt for follow up. Pt reported they are going to potentially relocate in a few months to the [facility] area. Pt is set to have hip surgery on the 10th and is staying in [place] currently. SW to follow up with pt by phone on 4/12 to provide resources to pt in the [facility] area.

## 2023-04-02 ENCOUNTER — PATIENT MESSAGE (OUTPATIENT)
Dept: INTERNAL MEDICINE | Age: 23
End: 2023-04-02

## 2023-04-02 DIAGNOSIS — G90.A POTS (POSTURAL ORTHOSTATIC TACHYCARDIA SYNDROME): ICD-10-CM

## 2023-04-02 LAB — FRUCTOSAMINE SERPL-SCNC: 249 UMOL/L (ref 205–285)

## 2023-04-03 RX ORDER — FLUDROCORTISONE ACETATE 0.1 MG/1
0.1 TABLET ORAL DAILY
Qty: 90 TABLET | Refills: 1 | Status: SHIPPED | OUTPATIENT
Start: 2023-04-03

## 2023-04-26 ENCOUNTER — TELEPHONE (OUTPATIENT)
Dept: INTERNAL MEDICINE | Age: 23
End: 2023-04-26

## 2023-04-26 DIAGNOSIS — N94.6 DYSMENORRHEA: ICD-10-CM

## 2023-04-26 DIAGNOSIS — F32.A DEPRESSION, UNSPECIFIED DEPRESSION TYPE: ICD-10-CM

## 2023-04-26 DIAGNOSIS — G90.A POTS (POSTURAL ORTHOSTATIC TACHYCARDIA SYNDROME): ICD-10-CM

## 2023-04-26 DIAGNOSIS — G95.0 SYRINGOMYELIA (HCC): ICD-10-CM

## 2023-04-26 DIAGNOSIS — N80.9 ENDOMETRIOSIS: ICD-10-CM

## 2023-04-26 DIAGNOSIS — Q79.60 EHLERS-DANLOS SYNDROME: ICD-10-CM

## 2023-04-26 DIAGNOSIS — F41.9 ANXIETY: ICD-10-CM

## 2023-04-26 DIAGNOSIS — F48.9 MOOD PROBLEM: ICD-10-CM

## 2023-04-26 NOTE — TELEPHONE ENCOUNTER
LISW made contact to pt for follow up. Pt reports relocating to the [facility] area in May. LISW to follow up with pt related to resources in that area.

## 2023-04-27 RX ORDER — LAMOTRIGINE 100 MG/1
100 TABLET ORAL DAILY
Qty: 90 TABLET | Refills: 1 | OUTPATIENT
Start: 2023-04-27

## 2023-05-01 RX ORDER — LAMOTRIGINE 100 MG/1
100 TABLET ORAL DAILY
Qty: 90 TABLET | Refills: 1 | Status: SHIPPED | OUTPATIENT
Start: 2023-05-01

## 2023-05-01 RX ORDER — UBROGEPANT 50 MG/1
50 TABLET ORAL DAILY PRN
Qty: 30 TABLET | Refills: 0 | Status: SHIPPED | OUTPATIENT
Start: 2023-05-01

## 2023-05-01 NOTE — TELEPHONE ENCOUNTER
Last Appointment:  3/30/2023  Future Appointments   Date Time Provider Nikhil Tuttlei   5/12/2023  2:20 PM Anthony Hatfield, [address] Neurology -   5/17/2023  8:45 AM Rosenda Viramontes MD [facility]   5/24/2023  2:40 PM DO OLIVIER Alatorre OLIVO SLEEP [facility]

## 2023-05-16 ENCOUNTER — TELEPHONE (OUTPATIENT)
Dept: INTERNAL MEDICINE | Age: 23
End: 2023-05-16

## 2023-05-16 NOTE — PROGRESS NOTES
The following care gaps have been identified:  Covid #4 - patient encouraged to obtain  Varicella - not immune, needs vaccine  Hep A  Hep B  Pamela Ramsey LPN'
for Hemorrhoids 12 suppository 1    famotidine (PEPCID) 20 MG tablet Take 1 tablet by mouth 2 times daily Taking as needed before ibuprofen      Levonorgestrel [facility]) IUD 19.5 mg 19.5 each by IntraUTERine route Dec 2020 inserted, remove 5 years  Manage endometriosis, not confirmed      Cholecalciferol 50 MCG (2000 UT) TABS Take 2 tablets by mouth nightly      loratadine (CLARITIN) 10 MG capsule Take 1 capsule by mouth daily Indications: Allergic Rhinitis       No current facility-administered medications on file prior to visit. OBJECTIVE:    VS:   Vitals:    05/17/23 0854   BP: 129/79   Site: Right Upper Arm   Position: Sitting   Cuff Size: Medium Adult   Pulse: (!) 110   Resp: 18   Temp: 97.6 °F (36.4 °C)   TempSrc: Temporal   SpO2: 96%   Weight: 132 lb (59.9 kg)   Height: 5' 8\" (1.727 m)     Physical Exam   Lungs:  CTA B  Neck:   No carotid bruits appreciated B.   CVS:  +s1/s2 without m/g/r appreciated. Abd:  + BS, NTND, No renal or aortic bruits   Extr:  2+ DP/PT pulses B, no pitting edema  Gait is tentative. RTC:  Patient moving - to establish with provider in [facility] area.      Hayden Stockton MD   5/17/2023 9:13 AM

## 2023-05-16 NOTE — TELEPHONE ENCOUNTER
JOIE made contact to pt as pt is relocating to [place]. SW provided pt with 3 agencies in the [place] area.  [facility], [address] provided contact information as well if pt needed further assistance

## 2023-05-17 ENCOUNTER — OFFICE VISIT (OUTPATIENT)
Dept: INTERNAL MEDICINE | Age: 23
End: 2023-05-17
Payer: COMMERCIAL

## 2023-05-17 VITALS
RESPIRATION RATE: 18 BRPM | SYSTOLIC BLOOD PRESSURE: 129 MMHG | OXYGEN SATURATION: 96 % | TEMPERATURE: 97.6 F | HEIGHT: 68 IN | BODY MASS INDEX: 20 KG/M2 | HEART RATE: 110 BPM | DIASTOLIC BLOOD PRESSURE: 79 MMHG | WEIGHT: 132 LBS

## 2023-05-17 DIAGNOSIS — R00.0 TACHYCARDIA: Primary | ICD-10-CM

## 2023-05-17 DIAGNOSIS — F32.A DEPRESSION, UNSPECIFIED DEPRESSION TYPE: ICD-10-CM

## 2023-05-17 DIAGNOSIS — G47.9 SLEEP DISTURBANCE: ICD-10-CM

## 2023-05-17 DIAGNOSIS — Q79.60 EHLERS-DANLOS SYNDROME: ICD-10-CM

## 2023-05-17 DIAGNOSIS — G90.A POTS (POSTURAL ORTHOSTATIC TACHYCARDIA SYNDROME): ICD-10-CM

## 2023-05-17 DIAGNOSIS — F41.9 ANXIETY: ICD-10-CM

## 2023-05-17 PROCEDURE — G8420 CALC BMI NORM PARAMETERS: HCPCS | Performed by: INTERNAL MEDICINE

## 2023-05-17 PROCEDURE — 99214 OFFICE O/P EST MOD 30 MIN: CPT | Performed by: INTERNAL MEDICINE

## 2023-05-17 PROCEDURE — 99213 OFFICE O/P EST LOW 20 MIN: CPT | Performed by: INTERNAL MEDICINE

## 2023-05-17 PROCEDURE — G8427 DOCREV CUR MEDS BY ELIG CLIN: HCPCS | Performed by: INTERNAL MEDICINE

## 2023-05-17 PROCEDURE — 1036F TOBACCO NON-USER: CPT | Performed by: INTERNAL MEDICINE

## 2023-05-17 RX ORDER — DULOXETIN HYDROCHLORIDE 30 MG/1
CAPSULE, DELAYED RELEASE ORAL
Qty: 180 CAPSULE | Refills: 5 | Status: CANCELLED | OUTPATIENT
Start: 2023-05-17

## 2023-05-17 RX ORDER — UBROGEPANT 50 MG/1
50 TABLET ORAL DAILY PRN
Qty: 180 TABLET | Refills: 1 | Status: CANCELLED | OUTPATIENT
Start: 2023-05-17 | End: 2023-11-13

## 2023-05-17 RX ORDER — OMEPRAZOLE 40 MG/1
40 CAPSULE, DELAYED RELEASE ORAL
Qty: 30 CAPSULE | Refills: 3 | Status: CANCELLED | OUTPATIENT
Start: 2023-05-17

## 2023-05-17 RX ORDER — GABAPENTIN 300 MG/1
300 CAPSULE ORAL 2 TIMES DAILY
Qty: 180 CAPSULE | Refills: 0 | Status: SHIPPED | OUTPATIENT
Start: 2023-05-17 | End: 2023-08-15

## 2023-05-17 RX ORDER — GABAPENTIN 300 MG/1
300 CAPSULE ORAL 2 TIMES DAILY
Qty: 180 CAPSULE | Refills: 0 | Status: CANCELLED | OUTPATIENT
Start: 2023-05-17

## 2023-05-17 RX ORDER — BACLOFEN 10 MG/1
10 TABLET ORAL 2 TIMES DAILY
Qty: 180 TABLET | Refills: 1 | Status: CANCELLED | OUTPATIENT
Start: 2023-05-17

## 2023-05-17 RX ORDER — FLUTICASONE PROPIONATE 50 MCG
1 SPRAY, SUSPENSION (ML) NASAL DAILY
Qty: 16 G | Refills: 2 | Status: CANCELLED | OUTPATIENT
Start: 2023-05-17

## 2023-05-17 RX ORDER — ESTRADIOL 0.1 MG/G
0.5 CREAM VAGINAL SEE ADMIN INSTRUCTIONS
Qty: 45 G | Refills: 0 | Status: CANCELLED | OUTPATIENT
Start: 2023-05-17

## 2023-05-17 NOTE — PATIENT INSTRUCTIONS
Dr. Michelle Lucero.  Associate  of the internal medicine residency program.   Good luck as you begin a new adventure!

## 2023-05-18 DIAGNOSIS — G95.0 SYRINGOMYELIA (HCC): ICD-10-CM

## 2023-05-18 DIAGNOSIS — Q79.60 EHLERS-DANLOS SYNDROME: ICD-10-CM

## 2023-05-18 DIAGNOSIS — F41.9 ANXIETY: ICD-10-CM

## 2023-05-18 DIAGNOSIS — N94.6 DYSMENORRHEA: ICD-10-CM

## 2023-05-18 DIAGNOSIS — G90.A POTS (POSTURAL ORTHOSTATIC TACHYCARDIA SYNDROME): ICD-10-CM

## 2023-05-18 DIAGNOSIS — F32.A DEPRESSION, UNSPECIFIED DEPRESSION TYPE: ICD-10-CM

## 2023-05-18 DIAGNOSIS — N80.9 ENDOMETRIOSIS: ICD-10-CM

## 2023-05-18 DIAGNOSIS — F48.9 MOOD PROBLEM: ICD-10-CM

## 2023-05-19 PROBLEM — R53.1 GENERALIZED WEAKNESS: Status: RESOLVED | Noted: 2019-03-14 | Resolved: 2023-05-19

## 2023-05-19 PROBLEM — R00.0 TACHYCARDIA: Status: ACTIVE | Noted: 2023-05-19

## 2023-05-19 PROBLEM — M25.551 RIGHT HIP PAIN: Status: RESOLVED | Noted: 2021-03-10 | Resolved: 2023-05-19

## 2023-05-19 PROBLEM — F41.9 ANXIETY: Status: ACTIVE | Noted: 2023-05-19

## 2023-05-19 PROBLEM — G47.9 SLEEP DISTURBANCE: Status: ACTIVE | Noted: 2023-05-19

## 2023-05-19 RX ORDER — BUSPIRONE HYDROCHLORIDE 10 MG/1
TABLET ORAL
Qty: 60 TABLET | Refills: 2 | Status: SHIPPED | OUTPATIENT
Start: 2023-05-19

## 2023-05-31 ENCOUNTER — HOSPITAL ENCOUNTER (OUTPATIENT)
Dept: PHYSICAL THERAPY | Facility: CLINIC | Age: 23
Setting detail: THERAPIES SERIES
Discharge: HOME | End: 2023-05-31
Payer: COMMERCIAL

## 2023-05-31 PROCEDURE — 97161 PT EVAL LOW COMPLEX 20 MIN: CPT

## 2023-05-31 NOTE — CONSULTS
[address] [facility]  Phone: [phone]  Fax: [phone]      Physical Therapy Lower Extremity Evaluation    Date:  2023  Patient: Candance Darling \"ALONDRA\"  : 2000  MRN: [id number]  Physician: Brian Jain MD  Insurance: Isaias Nayak (40/40 Visits Approved, [employer])  Medical Diagnosis: M62.81 (ICD-10-CM) - Muscle weakness (generalized)  M25.552 (ICD-10-CM) - Pain in left hip  Rehab Codes: M25.552, M25.652, R26.2  Onset date: 4/10/23   Next Dr's appt.: 23    Subjective:  *ALONDRA*  CC/HPI: Pt reports to PT s/p L Hip arthroscopy on 4/10/23. Per pt, she reports that she has been doing okay, however . Pt states that she has not been doing any formal PT following her surgery to date, but states that she was doing her own therapy in the pool. Per pt, she reports that she was told that she has a 90* hip flexion limit. Pt presents with crutch that she uses at all times. Pt reports occasional tingling in her L thigh but states that it has been improving.       PMHx: [x] Refer to full medical chart in [facility]   [] Unremarkable [] Diabetes [] HTN  [] Pacemaker   [] MI/Heart Problems [] Cancer [] Arthritis   [] Other:                    Radiology: Date Performed: Results:     [] X-RAY     [] MRI     [] Other            Comorbidities:   [] Obesity [] Dialysis  [] N/A   [x] Asthma/COPD [] Dementia [x] Other: Depression, anxiety   [] Stroke [] Sleep apnea [] Other:   [] Vascular disease [] Rheumatic disease [] Other:       Fall Risk:       [] None   [x] PRESENT/See Saleh Scale   Medications:  [x] Refer to full medical record [] None [] Other:  Allergies:       [x] Refer to full medical record [] None [] Other:      ADL/IADL [x] Previously independent with all [] Currently independent with all Who currently assists the patient with task     [] Previously independent with all except: [x] Currently independent with all except:     Bathing  [] Assist [] Assist

## 2023-06-30 ENCOUNTER — PATIENT MESSAGE (OUTPATIENT)
Dept: INTERNAL MEDICINE | Age: 23
End: 2023-06-30

## 2023-07-01 DIAGNOSIS — F32.A DEPRESSION, UNSPECIFIED DEPRESSION TYPE: ICD-10-CM

## 2023-07-01 DIAGNOSIS — F41.9 ANXIETY: ICD-10-CM

## 2023-07-01 DIAGNOSIS — M62.838 MUSCLE SPASM: ICD-10-CM

## 2023-07-01 DIAGNOSIS — K59.09 CHRONIC CONSTIPATION: ICD-10-CM

## 2023-07-01 DIAGNOSIS — N94.6 DYSMENORRHEA: ICD-10-CM

## 2023-07-01 DIAGNOSIS — Q79.62 HYPERMOBILE EHLERS-DANLOS SYNDROME: ICD-10-CM

## 2023-07-01 DIAGNOSIS — G90.A POTS (POSTURAL ORTHOSTATIC TACHYCARDIA SYNDROME): ICD-10-CM

## 2023-07-01 DIAGNOSIS — N92.1 MENORRHAGIA WITH IRREGULAR CYCLE: ICD-10-CM

## 2023-07-01 DIAGNOSIS — M25.50 ARTHRALGIA, UNSPECIFIED JOINT: ICD-10-CM

## 2023-07-01 DIAGNOSIS — F39 MOOD DISORDER (HCC): ICD-10-CM

## 2023-07-03 ENCOUNTER — APPOINTMENT (OUTPATIENT)
Dept: CT IMAGING | Age: 23
End: 2023-07-03
Payer: COMMERCIAL

## 2023-07-03 ENCOUNTER — HOSPITAL ENCOUNTER (EMERGENCY)
Age: 23
Discharge: HOME | End: 2023-07-03
Attending: EMERGENCY MEDICINE
Payer: COMMERCIAL

## 2023-07-03 VITALS
HEART RATE: 118 BPM | OXYGEN SATURATION: 100 % | BODY MASS INDEX: 19.7 KG/M2 | WEIGHT: 130 LBS | DIASTOLIC BLOOD PRESSURE: 86 MMHG | RESPIRATION RATE: 18 BRPM | TEMPERATURE: 98.5 F | SYSTOLIC BLOOD PRESSURE: 140 MMHG | HEIGHT: 68 IN

## 2023-07-03 DIAGNOSIS — R07.0 THROAT PAIN: Primary | ICD-10-CM

## 2023-07-03 LAB
ANION GAP SERPL CALCULATED.3IONS-SCNC: 9 MMOL/L (ref 9–17)
BASOPHILS # BLD: 0 K/UL (ref 0–0.2)
BASOPHILS NFR BLD: 1 % (ref 0–2)
BUN SERPL-MCNC: 12 MG/DL (ref 6–20)
CALCIUM SERPL-MCNC: 10.7 MG/DL (ref 8.6–10.4)
CHLORIDE SERPL-SCNC: 100 MMOL/L (ref 98–107)
CO2 SERPL-SCNC: 31 MMOL/L (ref 20–31)
CREAT SERPL-MCNC: 0.51 MG/DL (ref 0.5–0.9)
EOSINOPHIL # BLD: 0.1 K/UL (ref 0–0.4)
EOSINOPHILS RELATIVE PERCENT: 2 % (ref 1–4)
ERYTHROCYTE [DISTWIDTH] IN BLOOD BY AUTOMATED COUNT: 12.3 % (ref 12.5–15.4)
GFR SERPL CREATININE-BSD FRML MDRD: >60 ML/MIN/1.73M2
GLUCOSE SERPL-MCNC: 107 MG/DL (ref 70–99)
HCG SERPL QL: NEGATIVE
HCT VFR BLD AUTO: 41.2 % (ref 36–46)
HGB BLD-MCNC: 14 G/DL (ref 12–16)
LYMPHOCYTES # BLD: 44 % (ref 24–44)
LYMPHOCYTES NFR BLD: 2.6 K/UL (ref 1–4.8)
MCH RBC QN AUTO: 29.9 PG (ref 26–34)
MCHC RBC AUTO-ENTMCNC: 34 G/DL (ref 31–37)
MCV RBC AUTO: 88.1 FL (ref 80–100)
MONOCYTES NFR BLD: 0.3 K/UL (ref 0.1–1.2)
MONOCYTES NFR BLD: 6 % (ref 2–11)
NEUTROPHILS NFR BLD: 47 % (ref 36–66)
NEUTS SEG NFR BLD: 2.8 K/UL (ref 1.8–7.7)
PLATELET # BLD AUTO: 367 K/UL (ref 140–450)
PMV BLD AUTO: 6.8 FL (ref 6–12)
POTASSIUM SERPL-SCNC: 4 MMOL/L (ref 3.7–5.3)
RBC # BLD AUTO: 4.68 M/UL (ref 4–5.2)
SODIUM SERPL-SCNC: 140 MMOL/L (ref 135–144)
WBC OTHER # BLD: 5.9 K/UL (ref 3.5–11)

## 2023-07-03 PROCEDURE — 84703 CHORIONIC GONADOTROPIN ASSAY: CPT

## 2023-07-03 PROCEDURE — 6360000004 HC RX CONTRAST MEDICATION: Performed by: NURSE PRACTITIONER

## 2023-07-03 PROCEDURE — 2580000003 HC RX 258: Performed by: NURSE PRACTITIONER

## 2023-07-03 PROCEDURE — 70491 CT SOFT TISSUE NECK W/DYE: CPT

## 2023-07-03 PROCEDURE — 85027 COMPLETE CBC AUTOMATED: CPT

## 2023-07-03 PROCEDURE — 36415 COLL VENOUS BLD VENIPUNCTURE: CPT

## 2023-07-03 PROCEDURE — 99285 EMERGENCY DEPT VISIT HI MDM: CPT

## 2023-07-03 PROCEDURE — 80048 BASIC METABOLIC PNL TOTAL CA: CPT

## 2023-07-03 RX ORDER — SODIUM CHLORIDE 0.9 % (FLUSH) 0.9 %
10 SYRINGE (ML) INJECTION PRN
Status: DISCONTINUED | OUTPATIENT
Start: 2023-07-03 | End: 2023-07-03 | Stop reason: HOSPADM

## 2023-07-03 RX ORDER — BACLOFEN 10 MG/1
10 TABLET ORAL 2 TIMES DAILY
Qty: 180 TABLET | Refills: 1 | OUTPATIENT
Start: 2023-07-03

## 2023-07-03 RX ORDER — 0.9 % SODIUM CHLORIDE 0.9 %
80 INTRAVENOUS SOLUTION INTRAVENOUS ONCE
Status: DISCONTINUED | OUTPATIENT
Start: 2023-07-03 | End: 2023-07-03 | Stop reason: HOSPADM

## 2023-07-03 RX ORDER — FLUDROCORTISONE ACETATE 0.1 MG/1
0.1 TABLET ORAL DAILY
Qty: 90 TABLET | Refills: 1 | OUTPATIENT
Start: 2023-07-03

## 2023-07-03 RX ORDER — LORAZEPAM 2 MG/ML
0.5 INJECTION INTRAMUSCULAR ONCE
Status: DISCONTINUED | OUTPATIENT
Start: 2023-07-03 | End: 2023-07-03

## 2023-07-03 RX ORDER — UBROGEPANT 50 MG/1
50 TABLET ORAL DAILY PRN
Qty: 30 TABLET | Refills: 1 | Status: SHIPPED | OUTPATIENT
Start: 2023-07-03

## 2023-07-03 RX ORDER — ORPHENADRINE CITRATE 30 MG/ML
60 INJECTION INTRAMUSCULAR; INTRAVENOUS ONCE
Status: DISCONTINUED | OUTPATIENT
Start: 2023-07-03 | End: 2023-07-03

## 2023-07-03 RX ORDER — GABAPENTIN 300 MG/1
300 CAPSULE ORAL 2 TIMES DAILY
Qty: 180 CAPSULE | Refills: 0 | OUTPATIENT
Start: 2023-07-03 | End: 2023-10-01

## 2023-07-03 RX ADMIN — SODIUM CHLORIDE, PRESERVATIVE FREE 10 ML: 5 INJECTION INTRAVENOUS at 19:49

## 2023-07-03 RX ADMIN — Medication 80 ML: at 19:49

## 2023-07-03 RX ADMIN — IOPAMIDOL 75 ML: 755 INJECTION, SOLUTION INTRAVENOUS at 19:49

## 2023-07-03 ASSESSMENT — PAIN - FUNCTIONAL ASSESSMENT: PAIN_FUNCTIONAL_ASSESSMENT: 0-10

## 2023-07-03 ASSESSMENT — LIFESTYLE VARIABLES
HOW OFTEN DO YOU HAVE A DRINK CONTAINING ALCOHOL: NEVER
HOW MANY STANDARD DRINKS CONTAINING ALCOHOL DO YOU HAVE ON A TYPICAL DAY: PATIENT DOES NOT DRINK

## 2023-07-03 ASSESSMENT — PAIN DESCRIPTION - LOCATION: LOCATION: THROAT;JAW

## 2023-07-03 ASSESSMENT — PAIN SCALES - GENERAL: PAINLEVEL_OUTOF10: 7

## 2023-07-03 ASSESSMENT — PAIN DESCRIPTION - ORIENTATION: ORIENTATION: LEFT

## 2023-07-03 NOTE — ED NOTES
NP at bedside, unable to assess patient at time pt holding mouth with cee Winkler, LAURA  07/03/23 2516

## 2023-07-10 ENCOUNTER — TELEPHONE (OUTPATIENT)
Dept: ORTHOPEDIC SURGERY | Age: 23
End: 2023-07-10

## 2023-07-10 NOTE — TELEPHONE ENCOUNTER
Pt called stating that she needs the removal of plate from a femoral derotational ostomy. She has been seeing Dr. Kiesha Mast with [facility]. She states her ins is changing to medicaid and he will not accept that so he can't do the procedure. Pt has seen Dr. Jake Dumont and he referred her to Dr. Kiesha Mast. Please call pt.

## 2023-08-07 DIAGNOSIS — M62.838 MUSCLE SPASM: ICD-10-CM

## 2023-08-07 DIAGNOSIS — N92.1 MENORRHAGIA WITH IRREGULAR CYCLE: ICD-10-CM

## 2023-08-07 DIAGNOSIS — G90.A POTS (POSTURAL ORTHOSTATIC TACHYCARDIA SYNDROME): ICD-10-CM

## 2023-08-07 DIAGNOSIS — K59.09 CHRONIC CONSTIPATION: ICD-10-CM

## 2023-08-07 DIAGNOSIS — N94.6 DYSMENORRHEA: ICD-10-CM

## 2023-08-07 DIAGNOSIS — M25.50 ARTHRALGIA, UNSPECIFIED JOINT: ICD-10-CM

## 2023-08-07 DIAGNOSIS — F41.9 ANXIETY: ICD-10-CM

## 2023-08-07 DIAGNOSIS — F32.A DEPRESSION, UNSPECIFIED DEPRESSION TYPE: ICD-10-CM

## 2023-08-07 DIAGNOSIS — F39 MOOD DISORDER (HCC): ICD-10-CM

## 2023-08-07 DIAGNOSIS — Q79.62 HYPERMOBILE EHLERS-DANLOS SYNDROME: ICD-10-CM

## 2023-08-07 RX ORDER — BACLOFEN 10 MG/1
10 TABLET ORAL 2 TIMES DAILY
Qty: 60 TABLET | Refills: 0 | Status: SHIPPED | OUTPATIENT
Start: 2023-08-07

## 2023-08-07 RX ORDER — GABAPENTIN 300 MG/1
300 CAPSULE ORAL 2 TIMES DAILY
Qty: 180 CAPSULE | Refills: 0 | OUTPATIENT
Start: 2023-08-07 | End: 2023-11-05

## 2023-08-07 RX ORDER — GABAPENTIN 300 MG/1
300 CAPSULE ORAL 2 TIMES DAILY
Qty: 60 CAPSULE | Refills: 0 | Status: SHIPPED | OUTPATIENT
Start: 2023-08-07 | End: 2023-09-06

## 2023-08-07 RX ORDER — BACLOFEN 10 MG/1
10 TABLET ORAL 2 TIMES DAILY
Qty: 180 TABLET | Refills: 1 | OUTPATIENT
Start: 2023-08-07

## 2023-08-07 RX ORDER — FLUDROCORTISONE ACETATE 0.1 MG/1
0.1 TABLET ORAL DAILY
Qty: 90 TABLET | Refills: 1 | OUTPATIENT
Start: 2023-08-07

## 2023-09-01 RX ORDER — OMEPRAZOLE 40 MG/1
40 CAPSULE, DELAYED RELEASE ORAL
Qty: 30 CAPSULE | Refills: 3 | Status: SHIPPED | OUTPATIENT
Start: 2023-09-01

## 2023-09-07 ENCOUNTER — OFFICE VISIT (OUTPATIENT)
Dept: NEUROLOGY | Age: 23
End: 2023-09-07

## 2023-09-07 VITALS
DIASTOLIC BLOOD PRESSURE: 77 MMHG | HEART RATE: 113 BPM | RESPIRATION RATE: 18 BRPM | BODY MASS INDEX: 20.83 KG/M2 | SYSTOLIC BLOOD PRESSURE: 115 MMHG | OXYGEN SATURATION: 97 % | TEMPERATURE: 99.2 F | WEIGHT: 137 LBS

## 2023-09-07 DIAGNOSIS — G43.709 CHRONIC MIGRAINE WITHOUT AURA WITHOUT STATUS MIGRAINOSUS, NOT INTRACTABLE: Primary | ICD-10-CM

## 2023-09-07 RX ORDER — TOPIRAMATE 25 MG/1
TABLET ORAL
Qty: 60 TABLET | Refills: 3 | Status: SHIPPED | OUTPATIENT
Start: 2023-09-07 | End: 2023-09-28

## 2023-09-07 RX ORDER — RIZATRIPTAN BENZOATE 10 MG/1
10 TABLET ORAL
Qty: 30 TABLET | Refills: 2 | Status: SHIPPED | OUTPATIENT
Start: 2023-09-07 | End: 2023-09-07

## 2023-09-07 ASSESSMENT — ENCOUNTER SYMPTOMS
SHORTNESS OF BREATH: 0
VOMITING: 0
CONSTIPATION: 0
STRIDOR: 0
ABDOMINAL DISTENTION: 0
ABDOMINAL PAIN: 0
WHEEZING: 0
COUGH: 0
DIARRHEA: 0
NAUSEA: 0

## 2023-09-07 NOTE — PROGRESS NOTES
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 The patients reports experiencing migraines since age 6. She reports her migraines are frequent occurring 4-7 times a months. The migraines are triggered by strong scents. She reports migraines are around her whole head, and describes them as a pressure like sensation. She reports photophobia, phonophobia, and nausea. She also reports feeling increased brain fog and irritable mood during migraines. She reports neck tension, and occasional paresthesias in her right arm associated with the migraines. She experiences an aura before each migraine, described as black specs in her visual field. Her migraines are relieved by Peggy Kd, however her insurance no longer covers it. She occasionally takes NSAIDS, however she has a history of stomach ulcer and tries to avoid them. The patient was diagnosed with Syringomyelia in 2019 after having urinary incontinence, back pain, and paraesthesias in bilateral upper extremities. She still reports occasional upper back pain and paraesthesias in bilateral upper extremities. She has not had surgical intervention for the Syringomyelia. MRI brain w and wo contrast 11/23/22: Solitary small T2 hyperintense focus in the right parietal subcortical white matter doubtful clinical significance.   It may represent chronic  microvascular ischemic insult,  sequela of prior infectious/inflammatory  process or migraine

## 2023-09-08 DIAGNOSIS — G90.A POTS (POSTURAL ORTHOSTATIC TACHYCARDIA SYNDROME): ICD-10-CM

## 2023-09-08 DIAGNOSIS — F32.A DEPRESSION, UNSPECIFIED DEPRESSION TYPE: ICD-10-CM

## 2023-09-08 DIAGNOSIS — M25.50 ARTHRALGIA, UNSPECIFIED JOINT: ICD-10-CM

## 2023-09-08 DIAGNOSIS — N94.6 DYSMENORRHEA: ICD-10-CM

## 2023-09-08 DIAGNOSIS — M62.838 MUSCLE SPASM: ICD-10-CM

## 2023-09-08 DIAGNOSIS — F39 MOOD DISORDER (HCC): ICD-10-CM

## 2023-09-08 DIAGNOSIS — F41.9 ANXIETY: ICD-10-CM

## 2023-09-08 DIAGNOSIS — K59.09 CHRONIC CONSTIPATION: ICD-10-CM

## 2023-09-08 DIAGNOSIS — Q79.62 HYPERMOBILE EHLERS-DANLOS SYNDROME: ICD-10-CM

## 2023-09-08 DIAGNOSIS — N92.1 MENORRHAGIA WITH IRREGULAR CYCLE: ICD-10-CM

## 2023-09-08 RX ORDER — GABAPENTIN 300 MG/1
300 CAPSULE ORAL 2 TIMES DAILY
Qty: 60 CAPSULE | Refills: 0 | Status: SHIPPED | OUTPATIENT
Start: 2023-09-08 | End: 2023-10-08

## 2023-09-08 RX ORDER — FLUDROCORTISONE ACETATE 0.1 MG/1
0.1 TABLET ORAL DAILY
Qty: 90 TABLET | Refills: 0 | Status: SHIPPED | OUTPATIENT
Start: 2023-09-08

## 2023-09-08 RX ORDER — BACLOFEN 10 MG/1
10 TABLET ORAL 2 TIMES DAILY
Qty: 60 TABLET | Refills: 0 | Status: SHIPPED | OUTPATIENT
Start: 2023-09-08

## 2023-10-12 DIAGNOSIS — G90.A POTS (POSTURAL ORTHOSTATIC TACHYCARDIA SYNDROME): ICD-10-CM

## 2023-10-12 RX ORDER — FLUDROCORTISONE ACETATE 0.1 MG/1
0.1 TABLET ORAL DAILY
Qty: 90 TABLET | Refills: 0 | OUTPATIENT
Start: 2023-10-12

## 2023-10-12 RX ORDER — OMEPRAZOLE 40 MG/1
40 CAPSULE, DELAYED RELEASE ORAL
Qty: 30 CAPSULE | Refills: 3 | OUTPATIENT
Start: 2023-10-12

## 2023-10-12 RX ORDER — GABAPENTIN 300 MG/1
300 CAPSULE ORAL 2 TIMES DAILY
Qty: 60 CAPSULE | Refills: 0 | OUTPATIENT
Start: 2023-10-12 | End: 2023-11-11

## 2023-10-13 ENCOUNTER — OFFICE VISIT (OUTPATIENT)
Dept: FAMILY MEDICINE CLINIC | Age: 23
End: 2023-10-13

## 2023-10-13 VITALS
SYSTOLIC BLOOD PRESSURE: 106 MMHG | HEIGHT: 68 IN | WEIGHT: 134.6 LBS | BODY MASS INDEX: 20.4 KG/M2 | OXYGEN SATURATION: 100 % | RESPIRATION RATE: 16 BRPM | TEMPERATURE: 98.1 F | HEART RATE: 74 BPM | DIASTOLIC BLOOD PRESSURE: 80 MMHG

## 2023-10-13 DIAGNOSIS — N92.1 MENORRHAGIA WITH IRREGULAR CYCLE: ICD-10-CM

## 2023-10-13 DIAGNOSIS — F32.A DEPRESSION, UNSPECIFIED DEPRESSION TYPE: ICD-10-CM

## 2023-10-13 DIAGNOSIS — G90.A POTS (POSTURAL ORTHOSTATIC TACHYCARDIA SYNDROME): ICD-10-CM

## 2023-10-13 DIAGNOSIS — F48.9 MOOD PROBLEM: ICD-10-CM

## 2023-10-13 DIAGNOSIS — G95.0 SYRINGOMYELIA (HCC): ICD-10-CM

## 2023-10-13 DIAGNOSIS — N94.6 DYSMENORRHEA: ICD-10-CM

## 2023-10-13 DIAGNOSIS — M25.50 ARTHRALGIA, UNSPECIFIED JOINT: ICD-10-CM

## 2023-10-13 DIAGNOSIS — F39 MOOD DISORDER (HCC): ICD-10-CM

## 2023-10-13 DIAGNOSIS — F41.9 ANXIETY: ICD-10-CM

## 2023-10-13 DIAGNOSIS — K30 CHRONIC UPSET STOMACH: Primary | ICD-10-CM

## 2023-10-13 DIAGNOSIS — N80.9 ENDOMETRIOSIS: ICD-10-CM

## 2023-10-13 DIAGNOSIS — K59.09 CHRONIC CONSTIPATION: ICD-10-CM

## 2023-10-13 DIAGNOSIS — Q79.60 EHLERS-DANLOS SYNDROME: ICD-10-CM

## 2023-10-13 DIAGNOSIS — Q79.62 HYPERMOBILE EHLERS-DANLOS SYNDROME: ICD-10-CM

## 2023-10-13 DIAGNOSIS — M62.838 MUSCLE SPASM: ICD-10-CM

## 2023-10-13 RX ORDER — LAMOTRIGINE 100 MG/1
100 TABLET ORAL DAILY
Qty: 14 TABLET | Refills: 0 | Status: SHIPPED | OUTPATIENT
Start: 2023-10-13 | End: 2023-10-27

## 2023-10-13 RX ORDER — BACLOFEN 10 MG/1
10 TABLET ORAL 2 TIMES DAILY
Qty: 28 TABLET | Refills: 0 | Status: SHIPPED | OUTPATIENT
Start: 2023-10-13 | End: 2023-10-27

## 2023-10-13 RX ORDER — FLUDROCORTISONE ACETATE 0.1 MG/1
0.1 TABLET ORAL DAILY
Qty: 14 TABLET | Refills: 0 | Status: SHIPPED | OUTPATIENT
Start: 2023-10-13 | End: 2023-10-27

## 2023-10-13 RX ORDER — OMEPRAZOLE 40 MG/1
40 CAPSULE, DELAYED RELEASE ORAL
Qty: 14 CAPSULE | Refills: 0 | Status: SHIPPED | OUTPATIENT
Start: 2023-10-13 | End: 2023-10-27

## 2023-10-13 RX ORDER — DULOXETIN HYDROCHLORIDE 30 MG/1
30 CAPSULE, DELAYED RELEASE ORAL 2 TIMES DAILY
Qty: 28 CAPSULE | Refills: 0 | Status: SHIPPED | OUTPATIENT
Start: 2023-10-13 | End: 2023-10-27

## 2023-10-13 RX ORDER — BUSPIRONE HYDROCHLORIDE 10 MG/1
20 TABLET ORAL 2 TIMES DAILY
Qty: 56 TABLET | Refills: 0 | Status: SHIPPED | OUTPATIENT
Start: 2023-10-13 | End: 2023-10-27

## 2023-10-13 RX ORDER — SUCRALFATE 1 G/1
1 TABLET ORAL 4 TIMES DAILY
Qty: 56 TABLET | Refills: 0 | Status: SHIPPED | OUTPATIENT
Start: 2023-10-13 | End: 2023-10-27

## 2023-10-13 RX ORDER — GABAPENTIN 300 MG/1
300 CAPSULE ORAL 2 TIMES DAILY
Qty: 28 CAPSULE | Refills: 0 | Status: SHIPPED | OUTPATIENT
Start: 2023-10-13 | End: 2023-10-27

## 2023-10-13 SDOH — ECONOMIC STABILITY: FOOD INSECURITY: WITHIN THE PAST 12 MONTHS, YOU WORRIED THAT YOUR FOOD WOULD RUN OUT BEFORE YOU GOT MONEY TO BUY MORE.: NEVER TRUE

## 2023-10-13 SDOH — ECONOMIC STABILITY: INCOME INSECURITY: HOW HARD IS IT FOR YOU TO PAY FOR THE VERY BASICS LIKE FOOD, HOUSING, MEDICAL CARE, AND HEATING?: NOT HARD AT ALL

## 2023-10-13 SDOH — ECONOMIC STABILITY: FOOD INSECURITY: WITHIN THE PAST 12 MONTHS, THE FOOD YOU BOUGHT JUST DIDN'T LAST AND YOU DIDN'T HAVE MONEY TO GET MORE.: NEVER TRUE

## 2023-10-13 ASSESSMENT — PATIENT HEALTH QUESTIONNAIRE - PHQ9
SUM OF ALL RESPONSES TO PHQ QUESTIONS 1-9: 2
SUM OF ALL RESPONSES TO PHQ QUESTIONS 1-9: 2
6. FEELING BAD ABOUT YOURSELF - OR THAT YOU ARE A FAILURE OR HAVE LET YOURSELF OR YOUR FAMILY DOWN: 0
SUM OF ALL RESPONSES TO PHQ9 QUESTIONS 1 & 2: 2
1. LITTLE INTEREST OR PLEASURE IN DOING THINGS: 1
SUM OF ALL RESPONSES TO PHQ QUESTIONS 1-9: 2
7. TROUBLE CONCENTRATING ON THINGS, SUCH AS READING THE NEWSPAPER OR WATCHING TELEVISION: 0
3. TROUBLE FALLING OR STAYING ASLEEP: 0
10. IF YOU CHECKED OFF ANY PROBLEMS, HOW DIFFICULT HAVE THESE PROBLEMS MADE IT FOR YOU TO DO YOUR WORK, TAKE CARE OF THINGS AT HOME, OR GET ALONG WITH OTHER PEOPLE: 1
4. FEELING TIRED OR HAVING LITTLE ENERGY: 0
5. POOR APPETITE OR OVEREATING: 0
SUM OF ALL RESPONSES TO PHQ QUESTIONS 1-9: 2
8. MOVING OR SPEAKING SO SLOWLY THAT OTHER PEOPLE COULD HAVE NOTICED. OR THE OPPOSITE, BEING SO FIGETY OR RESTLESS THAT YOU HAVE BEEN MOVING AROUND A LOT MORE THAN USUAL: 0
9. THOUGHTS THAT YOU WOULD BE BETTER OFF DEAD, OR OF HURTING YOURSELF: 0
2. FEELING DOWN, DEPRESSED OR HOPELESS: 1

## 2023-10-13 NOTE — PROGRESS NOTES
Claudia Jones, APRN-[facility]  [address], [facility] Janeth Bansal,[facility]  [phone]        10/13/2023     Sami Rollins is a 21 y.o. adult, here for evaluation of the following medical concerns:    Chief Complaint   Patient presents with    Medication Problem     Patient has appt with pcp 10/24/23 but is needing refills on some medications. Abdominal Pain     Nausea. Patient reports having these sx previously. Requesting Carafate. Stomach ulcer? Gerd- heart burn, chest tightness relieved by Tums. HPI  Patient has appointment with primary care in 2 weeks. Patient is requesting a higher dose of buspar for her mental condition as the previous dose was not effective.    Patient Active Problem List   Diagnosis    Christen-Danlos syndrome    IUD (intrauterine device) in place    Generalized hypermobility of joints    POTS (postural orthostatic tachycardia syndrome)    Gastroparesis    Chronic constipation    Status post arthroscopy of hip    Iron deficiency anemia    Internal hemorrhoids    Autism spectrum    Biliary dyskinesia    Chronic bilateral low back pain without sciatica    Tachycardia    Anxiety    Sleep disturbance       Patient's recent lab reports are as follows:      Results for orders placed or performed in visit on 08/25/23   Transthoracic echocardiogram (TTE) complete with contrast, bubble, strain, and 3D PRN   Result Value Ref Range    IVSd 0.6 0.6 - 0.9 cm    LVIDd 3.9 3.9 - 5.3 cm    LVIDs 2.9 cm    LVOT Diameter 2.1 cm    LVPWd 0.8 0.6 - 0.9 cm    LVOT Peak Gradient 3 mmHg    LVOT Mean Gradient 2 mmHg    LVOT SV 55.4 ml    LVOT Peak Velocity 0.8 m/s    LVOT VTI 16.0 cm    RVIDd 3.0 cm    RV Free Wall Peak S' 12 cm/s    LA Diameter 2.2 cm    LA Volume A/L 26 mL    LA Volume 2C 27 22 - 52 mL    LA Volume 4C 23 22 - 52 mL    LA Volume BP 24 22 - 52 mL    AV Area by Peak Velocity 3.1 cm2    AV Area by VTI 3.3 cm2    AV Peak Gradient 4 mmHg    AV Mean Gradient 2

## 2023-10-23 NOTE — PROGRESS NOTES
[address] [facility] Internal Medicine      SUBJECTIVE:  Hannah Ordonez (:  2000) is a 21 y.o. adult here for evaluation of the following chief complaint(s):  Thyroid Problem (Wants labs, didn't have a period for 2 months and had blood test which was negative and read it was an issue for thyroid problem) and thinks he has ulcer (Wants carafate, put himself on gastric diet)  Was prescribed carafate from urgent care after having sharp stomach pains. Still taking PPI. This has helped the pain and provided complete relief. Fatty foods were bothering her. Will need to see GI for further assessment of these symptoms. Will place referral to GI in [facility] for further evaluation. Dr. Raad Lowry. Migraine Headaches - saw neurology in [facility] on 2023. Was started on Topamax. Maxalt was also started. She is to follow back up on 2024. Maxalt makes her sleepy and when she wakes up her HA is gone. Stopped Topamax because of numbness and tingling in her fingers and gave her a constant BARRERA. Marie to call neuro to update them on stopping Topamax. Tachycardia - 2-D echo was completed and a visit to cardiology was completed in [facility] at the [facility] provider there. Echo was completed on 2023 and was normal. Cardiology consultation was completed on 8/15/2023. Toprol, 12.5 mg was added at night. Florinef was continued. A 6 month follow-up was suggested. Toprol has helped decrease palpitations. Management as per cardiology   Continue Toprol Xl as per cardio recommendations. POTS syndrome - has been controlled on fludrocortisone. CMP was normal on 3/30/2023. Recheck CMP     Kellye - followed by orthopedics in [place], [place]. Dr. Shea Esquivel. Was discharged from ortho care on Friday. In august, had plate removed from her L leg. Was no weight bearing for 6 weeks. Depression/Anxiety - on buspar, duloxetine, lamictal.  Stable. Continue same.

## 2023-10-24 ENCOUNTER — OFFICE VISIT (OUTPATIENT)
Dept: INTERNAL MEDICINE | Age: 23
End: 2023-10-24
Payer: COMMERCIAL

## 2023-10-24 VITALS
OXYGEN SATURATION: 99 % | BODY MASS INDEX: 20 KG/M2 | RESPIRATION RATE: 18 BRPM | WEIGHT: 132 LBS | TEMPERATURE: 97.2 F | HEART RATE: 79 BPM | HEIGHT: 68 IN

## 2023-10-24 DIAGNOSIS — R10.13 EPIGASTRIC PAIN: ICD-10-CM

## 2023-10-24 DIAGNOSIS — N80.9 ENDOMETRIOSIS: ICD-10-CM

## 2023-10-24 DIAGNOSIS — N94.6 DYSMENORRHEA: ICD-10-CM

## 2023-10-24 DIAGNOSIS — Q79.60 EHLERS-DANLOS SYNDROME: ICD-10-CM

## 2023-10-24 DIAGNOSIS — F48.9 MOOD PROBLEM: ICD-10-CM

## 2023-10-24 DIAGNOSIS — N92.1 MENORRHAGIA WITH IRREGULAR CYCLE: ICD-10-CM

## 2023-10-24 DIAGNOSIS — F41.9 ANXIETY: ICD-10-CM

## 2023-10-24 DIAGNOSIS — G95.0 SYRINGOMYELIA (HCC): ICD-10-CM

## 2023-10-24 DIAGNOSIS — Q79.62 HYPERMOBILE EHLERS-DANLOS SYNDROME: ICD-10-CM

## 2023-10-24 DIAGNOSIS — G90.A POTS (POSTURAL ORTHOSTATIC TACHYCARDIA SYNDROME): Primary | ICD-10-CM

## 2023-10-24 DIAGNOSIS — F32.A DEPRESSION, UNSPECIFIED DEPRESSION TYPE: ICD-10-CM

## 2023-10-24 DIAGNOSIS — K30 CHRONIC UPSET STOMACH: ICD-10-CM

## 2023-10-24 DIAGNOSIS — M62.838 MUSCLE SPASM: ICD-10-CM

## 2023-10-24 PROCEDURE — 99214 OFFICE O/P EST MOD 30 MIN: CPT | Performed by: INTERNAL MEDICINE

## 2023-10-24 RX ORDER — SUCRALFATE 1 G/1
1 TABLET ORAL 4 TIMES DAILY
Qty: 120 TABLET | Refills: 2 | Status: SHIPPED | OUTPATIENT
Start: 2023-10-24 | End: 2024-01-22

## 2023-10-24 RX ORDER — LAMOTRIGINE 100 MG/1
100 TABLET ORAL DAILY
Qty: 90 TABLET | Refills: 0 | Status: SHIPPED | OUTPATIENT
Start: 2023-10-24 | End: 2024-01-22

## 2023-10-24 RX ORDER — GABAPENTIN 300 MG/1
300 CAPSULE ORAL 2 TIMES DAILY
Qty: 28 CAPSULE | Refills: 0 | Status: SHIPPED | OUTPATIENT
Start: 2023-10-24 | End: 2023-11-07

## 2023-10-24 RX ORDER — OMEPRAZOLE 40 MG/1
40 CAPSULE, DELAYED RELEASE ORAL
Qty: 90 CAPSULE | Refills: 0 | Status: SHIPPED | OUTPATIENT
Start: 2023-10-24 | End: 2024-01-22

## 2023-10-24 RX ORDER — FLUDROCORTISONE ACETATE 0.1 MG/1
0.1 TABLET ORAL DAILY
Qty: 90 TABLET | Refills: 0 | Status: SHIPPED | OUTPATIENT
Start: 2023-10-24 | End: 2024-01-22

## 2023-10-24 RX ORDER — DULOXETIN HYDROCHLORIDE 30 MG/1
30 CAPSULE, DELAYED RELEASE ORAL 2 TIMES DAILY
Qty: 180 CAPSULE | Refills: 0 | Status: SHIPPED | OUTPATIENT
Start: 2023-10-24 | End: 2024-01-22

## 2023-10-24 RX ORDER — BUSPIRONE HYDROCHLORIDE 10 MG/1
20 TABLET ORAL 2 TIMES DAILY
Qty: 120 TABLET | Refills: 2 | Status: SHIPPED | OUTPATIENT
Start: 2023-10-24 | End: 2024-01-22

## 2023-10-29 DIAGNOSIS — G43.709 CHRONIC MIGRAINE WITHOUT AURA WITHOUT STATUS MIGRAINOSUS, NOT INTRACTABLE: Primary | ICD-10-CM

## 2023-10-30 NOTE — TELEPHONE ENCOUNTER
E-scribe requesting refill for Maxalt. Please review and e-scribe if applicable.      Last Visit Date: 9/7/2023    Next Visit Date:  Future Appointments   Date Time Provider [address] [place] [address]   1/3/2024  3:30 PM Wilfredo Haro MD [facility] [address] [facility]   1/4/2024  3:30 PM Lauren Portillo MD Neuro [facility] Neurology -

## 2023-10-31 ENCOUNTER — PATIENT MESSAGE (OUTPATIENT)
Dept: INTERNAL MEDICINE | Age: 23
End: 2023-10-31

## 2023-10-31 DIAGNOSIS — G90.A POTS (POSTURAL ORTHOSTATIC TACHYCARDIA SYNDROME): ICD-10-CM

## 2023-10-31 DIAGNOSIS — F41.9 ANXIETY: ICD-10-CM

## 2023-10-31 DIAGNOSIS — Q79.62 HYPERMOBILE EHLERS-DANLOS SYNDROME: ICD-10-CM

## 2023-10-31 DIAGNOSIS — M25.50 ARTHRALGIA, UNSPECIFIED JOINT: ICD-10-CM

## 2023-10-31 DIAGNOSIS — M62.838 MUSCLE SPASM: ICD-10-CM

## 2023-10-31 DIAGNOSIS — F32.A DEPRESSION, UNSPECIFIED DEPRESSION TYPE: ICD-10-CM

## 2023-10-31 DIAGNOSIS — N92.1 MENORRHAGIA WITH IRREGULAR CYCLE: ICD-10-CM

## 2023-10-31 DIAGNOSIS — K59.09 CHRONIC CONSTIPATION: ICD-10-CM

## 2023-10-31 DIAGNOSIS — F39 MOOD DISORDER (HCC): ICD-10-CM

## 2023-10-31 DIAGNOSIS — N94.6 DYSMENORRHEA: ICD-10-CM

## 2023-11-07 ENCOUNTER — TELEPHONE (OUTPATIENT)
Dept: GASTROENTEROLOGY | Age: 23
End: 2023-11-07

## 2023-11-07 RX ORDER — BACLOFEN 10 MG/1
10 TABLET ORAL 2 TIMES DAILY
Qty: 60 TABLET | Refills: 2 | Status: SHIPPED | OUTPATIENT
Start: 2023-11-07 | End: 2024-02-05

## 2023-11-07 NOTE — TELEPHONE ENCOUNTER
Baclofen refilled. Marie with appointment with new PCP on 1/3/2024.       Hunter Bloom MD  11/7/2023

## 2023-11-08 RX ORDER — RIZATRIPTAN BENZOATE 10 MG/1
10 TABLET ORAL
Qty: 30 TABLET | Refills: 2 | Status: SHIPPED | OUTPATIENT
Start: 2023-11-08 | End: 2023-11-08

## 2023-11-15 ENCOUNTER — TELEPHONE (OUTPATIENT)
Dept: INTERNAL MEDICINE | Age: 23
End: 2023-11-15

## 2023-11-15 NOTE — TELEPHONE ENCOUNTER
----- Message from Lucas Reyes sent at 11/15/2023 12:21 PM EST -----  Regarding: Gabapentin Prescription   Contact: [phone]  Hi, I am out of gabapentin and realized the issue. You accidentally put refills on the 14 day supply instead of the 30 day supply. The pharmacy is having issues because of that, though they did find an older prescription that had one more refill so this isn't urgent.  Thank you,   Sabrina Euceda

## 2023-11-17 RX ORDER — GABAPENTIN 300 MG/1
300 CAPSULE ORAL 2 TIMES DAILY
Qty: 60 CAPSULE | Refills: 1 | Status: SHIPPED | OUTPATIENT
Start: 2023-11-17 | End: 2024-01-16

## 2023-11-17 NOTE — TELEPHONE ENCOUNTER
Refill placed for gabapentin for 2 months. This will be enough to ensure Carey Santiago has enough medication to see new PCP in January.      Jailyn Krishna MD  11/17/2023

## 2024-01-04 ENCOUNTER — OFFICE VISIT (OUTPATIENT)
Dept: NEUROLOGY | Age: 24
End: 2024-01-04

## 2024-01-04 VITALS
WEIGHT: 127 LBS | BODY MASS INDEX: 19.25 KG/M2 | SYSTOLIC BLOOD PRESSURE: 129 MMHG | DIASTOLIC BLOOD PRESSURE: 72 MMHG | HEIGHT: 68 IN | HEART RATE: 150 BPM

## 2024-01-04 DIAGNOSIS — G43.709 CHRONIC MIGRAINE WITHOUT AURA WITHOUT STATUS MIGRAINOSUS, NOT INTRACTABLE: Primary | ICD-10-CM

## 2024-01-04 DIAGNOSIS — G95.0 SYRINX OF SPINAL CORD (HCC): ICD-10-CM

## 2024-01-04 DIAGNOSIS — M54.9 UPPER BACK PAIN: ICD-10-CM

## 2024-01-04 RX ORDER — FROVATRIPTAN SUCCINATE 2.5 MG/1
2.5 TABLET, FILM COATED ORAL PRN
Qty: 30 TABLET | Refills: 3 | Status: SHIPPED | OUTPATIENT
Start: 2024-01-04

## 2024-01-04 RX ORDER — ZONISAMIDE 100 MG/1
CAPSULE ORAL
Qty: 30 CAPSULE | Refills: 0 | Status: SHIPPED | OUTPATIENT
Start: 2024-01-04

## 2024-01-04 RX ORDER — UBROGEPANT 50 MG/1
50 TABLET ORAL PRN
Qty: 30 TABLET | Refills: 2 | Status: SHIPPED | OUTPATIENT
Start: 2024-01-04 | End: 2024-01-14

## 2024-01-04 ASSESSMENT — ENCOUNTER SYMPTOMS
WHEEZING: 0
COLOR CHANGE: 0
ABDOMINAL PAIN: 0
NAUSEA: 0
COUGH: 0
PHOTOPHOBIA: 0
VOMITING: 0
RHINORRHEA: 0
ABDOMINAL DISTENTION: 0
DIARRHEA: 0
BACK PAIN: 1
CONSTIPATION: 0
CHOKING: 0
SHORTNESS OF BREATH: 0
SORE THROAT: 0

## 2024-01-05 NOTE — PROGRESS NOTES
[address] [facility], [facility] [address]  [place] [address]  P: [phone]  F: [phone]    NEUROLOGY CLINIC NOTE     PATIENT NAME: Chel Reyes  PATIENT MRN: [id number]  PRIMARY CARE PHYSICIAN: Amber Fuentes MD    HPI:      Chel Reyes is a 23 y.o. adult with a past medical history of Christen-Danlos syndrome, Autism spectrum disorder, Bipolar disorder, GERD, POTS, Syringomyelia, presents to clinic today as follow up for headache     History obtained from Patient    Patient continues to have headache mostly around period times, has it for 3 days and most of the time headaches last for 3 days. Does have headaches occurring 3 times a months as well. Was started on Topamax as a preventative measure. Patient didn't tolerate that due to tingling and numbness of both hands. 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 The patients reports experiencing migraines since age 11. She reports her migraines are frequent occurring 4-7 times a months. The migraines are triggered by strong scents. She reports migraines are around her whole head, and describes them as a pressure like sensation. She reports photophobia, phonophobia, and nausea. She also reports feeling increased brain fog and irritable mood during migraines. She reports neck tension, and occasional paresthesias in her right arm associated with the migraines. She experiences an aura before each migraine, described as black specs in her visual field. Her migraines are

## 2024-01-08 ENCOUNTER — HOSPITAL ENCOUNTER (OUTPATIENT)
Age: 24
Setting detail: SPECIMEN
Discharge: HOME | End: 2024-01-08

## 2024-01-08 ENCOUNTER — OFFICE VISIT (OUTPATIENT)
Dept: FAMILY MEDICINE CLINIC | Age: 24
End: 2024-01-08
Payer: COMMERCIAL

## 2024-01-08 VITALS
DIASTOLIC BLOOD PRESSURE: 84 MMHG | TEMPERATURE: 98 F | SYSTOLIC BLOOD PRESSURE: 122 MMHG | HEIGHT: 68 IN | WEIGHT: 132.4 LBS | HEART RATE: 84 BPM | OXYGEN SATURATION: 99 % | BODY MASS INDEX: 20.07 KG/M2

## 2024-01-08 DIAGNOSIS — Z13.31 ENCOUNTER FOR SCREENING FOR DEPRESSION: ICD-10-CM

## 2024-01-08 DIAGNOSIS — Z00.00 ENCOUNTER FOR MEDICAL EXAMINATION TO ESTABLISH CARE: Primary | ICD-10-CM

## 2024-01-08 DIAGNOSIS — Z01.89 ROUTINE LAB DRAW: ICD-10-CM

## 2024-01-08 DIAGNOSIS — M25.552 PAIN OF LEFT HIP: ICD-10-CM

## 2024-01-08 LAB
ALBUMIN SERPL-MCNC: 4.6 G/DL (ref 3.5–5.2)
ALBUMIN/GLOB SERPL: 1.8 {RATIO} (ref 1–2.5)
ALP SERPL-CCNC: 72 U/L (ref 35–104)
ALT SERPL-CCNC: 20 U/L (ref 5–33)
ANION GAP SERPL CALCULATED.3IONS-SCNC: 13 MMOL/L (ref 9–17)
AST SERPL-CCNC: 18 U/L
BASOPHILS # BLD: 0.06 K/UL (ref 0–0.2)
BASOPHILS NFR BLD: 1 % (ref 0–2)
BILIRUB SERPL-MCNC: 0.5 MG/DL (ref 0.3–1.2)
BUN SERPL-MCNC: 8 MG/DL (ref 6–20)
CALCIUM SERPL-MCNC: 9.6 MG/DL (ref 8.6–10.4)
CHLORIDE SERPL-SCNC: 103 MMOL/L (ref 98–107)
CHOLEST SERPL-MCNC: 182 MG/DL
CHOLESTEROL/HDL RATIO: 3.1
CO2 SERPL-SCNC: 24 MMOL/L (ref 20–31)
CREAT SERPL-MCNC: 0.5 MG/DL (ref 0.5–0.9)
EOSINOPHIL # BLD: 0.15 K/UL (ref 0–0.44)
EOSINOPHILS RELATIVE PERCENT: 3 % (ref 1–4)
ERYTHROCYTE [DISTWIDTH] IN BLOOD BY AUTOMATED COUNT: 11.9 % (ref 11.8–14.4)
FOLATE SERPL-MCNC: 18.7 NG/ML
GFR SERPL CREATININE-BSD FRML MDRD: >60 ML/MIN/1.73M2
GLUCOSE P FAST SERPL-MCNC: 84 MG/DL (ref 70–99)
HCT VFR BLD AUTO: 41.2 % (ref 36.3–47.1)
HDLC SERPL-MCNC: 59 MG/DL
HGB BLD-MCNC: 13 G/DL (ref 11.9–15.1)
IMM GRANULOCYTES # BLD AUTO: <0.03 K/UL (ref 0–0.3)
IMM GRANULOCYTES NFR BLD: 0 %
LDLC SERPL CALC-MCNC: 109 MG/DL (ref 0–130)
LYMPHOCYTES NFR BLD: 2.31 K/UL (ref 1.1–3.7)
LYMPHOCYTES RELATIVE PERCENT: 39 % (ref 24–43)
MCH RBC QN AUTO: 29 PG (ref 25.2–33.5)
MCHC RBC AUTO-ENTMCNC: 31.6 G/DL (ref 28.4–34.8)
MCV RBC AUTO: 92 FL (ref 82.6–102.9)
MONOCYTES NFR BLD: 0.45 K/UL (ref 0.1–1.2)
MONOCYTES NFR BLD: 8 % (ref 3–12)
NEUTROPHILS NFR BLD: 49 % (ref 36–65)
NEUTS SEG NFR BLD: 2.98 K/UL (ref 1.5–8.1)
NRBC BLD-RTO: 0 PER 100 WBC
PLATELET # BLD AUTO: 368 K/UL (ref 138–453)
PMV BLD AUTO: 9.8 FL (ref 8.1–13.5)
POTASSIUM SERPL-SCNC: 4 MMOL/L (ref 3.7–5.3)
PROT SERPL-MCNC: 7.2 G/DL (ref 6.4–8.3)
RBC # BLD AUTO: 4.48 M/UL (ref 3.95–5.11)
SODIUM SERPL-SCNC: 140 MMOL/L (ref 135–144)
TRIGL SERPL-MCNC: 70 MG/DL
TSH SERPL DL<=0.05 MIU/L-ACNC: 1.92 UIU/ML (ref 0.3–5)
VIT B12 SERPL-MCNC: 411 PG/ML (ref 232–1245)
WBC OTHER # BLD: 6 K/UL (ref 3.5–11.3)

## 2024-01-08 PROCEDURE — 99203 OFFICE O/P NEW LOW 30 MIN: CPT | Performed by: REGISTERED NURSE

## 2024-01-08 PROCEDURE — G0444 DEPRESSION SCREEN ANNUAL: HCPCS | Performed by: REGISTERED NURSE

## 2024-01-08 ASSESSMENT — PATIENT HEALTH QUESTIONNAIRE - PHQ9
SUM OF ALL RESPONSES TO PHQ QUESTIONS 1-9: 0
1. LITTLE INTEREST OR PLEASURE IN DOING THINGS: 0
4. FEELING TIRED OR HAVING LITTLE ENERGY: 0
3. TROUBLE FALLING OR STAYING ASLEEP: 0
7. TROUBLE CONCENTRATING ON THINGS, SUCH AS READING THE NEWSPAPER OR WATCHING TELEVISION: 0
SUM OF ALL RESPONSES TO PHQ QUESTIONS 1-9: 0
9. THOUGHTS THAT YOU WOULD BE BETTER OFF DEAD, OR OF HURTING YOURSELF: 0
5. POOR APPETITE OR OVEREATING: 0
2. FEELING DOWN, DEPRESSED OR HOPELESS: 0
6. FEELING BAD ABOUT YOURSELF - OR THAT YOU ARE A FAILURE OR HAVE LET YOURSELF OR YOUR FAMILY DOWN: 0
SUM OF ALL RESPONSES TO PHQ9 QUESTIONS 1 & 2: 0
8. MOVING OR SPEAKING SO SLOWLY THAT OTHER PEOPLE COULD HAVE NOTICED. OR THE OPPOSITE, BEING SO FIGETY OR RESTLESS THAT YOU HAVE BEEN MOVING AROUND A LOT MORE THAN USUAL: 0
SUM OF ALL RESPONSES TO PHQ QUESTIONS 1-9: 0
10. IF YOU CHECKED OFF ANY PROBLEMS, HOW DIFFICULT HAVE THESE PROBLEMS MADE IT FOR YOU TO DO YOUR WORK, TAKE CARE OF THINGS AT HOME, OR GET ALONG WITH OTHER PEOPLE: 0
SUM OF ALL RESPONSES TO PHQ QUESTIONS 1-9: 0

## 2024-01-08 ASSESSMENT — ENCOUNTER SYMPTOMS
BACK PAIN: 1
CONSTIPATION: 1

## 2024-01-08 NOTE — PROGRESS NOTES
History   Problem Relation Age of Onset    Heart Disease Mother     Other Mother         Hypermobile Christen-Danlos Syndrome, spinocerabellar ataxia 8, dysautonomia, neuropathy    Mental Illness Mother     Miscarriages / Stillbirths Mother     Depression Mother     High Cholesterol Mother     Mental Illness Father     Depression Father     Heart Disease Sister     Other Sister         Hypermobility Spectrum Disorder, dysautonomia    Mental Illness Brother     Learning Disabilities Brother     Dementia Maternal Grandmother     Heart Attack Maternal Grandfather     Heart Disease Maternal Grandfather     High Cholesterol Maternal Grandfather     Alcohol Abuse Maternal Grandfather     Heart Attack Paternal Grandfather     Heart Disease Paternal Grandfather     Heart Attack Maternal Uncle     Heart Disease Maternal Uncle     Mental Illness Paternal Aunt     Substance Abuse Paternal Aunt     Mental Illness Paternal Uncle     Substance Abuse Paternal Uncle     Alcohol Abuse Paternal Uncle     Substance Abuse Paternal Cousin         Social History     Socioeconomic History    Marital status:      Spouse name: None    Number of children: None    Years of education: None    Highest education level: None   Tobacco Use    Smoking status: Never    Smokeless tobacco: Never   Substance and Sexual Activity    Alcohol use: Never    Drug use: Never    Sexual activity: Yes     Partners: Male     Comment:      Social Determinants of Health     Financial Resource Strain: Low Risk  (10/13/2023)    Overall Financial Resource Strain (CARDIA)     Difficulty of Paying Living Expenses: Not hard at all   Transportation Needs: Unknown (10/13/2023)    PRAPARE - Transportation     Lack of Transportation (Non-Medical): No   Housing Stability: Unknown (10/13/2023)    Housing Stability Vital Sign     Unstable Housing in the Last Year: No       MEDICATIONS:      Current Outpatient Medications   Medication Sig Dispense Refill

## 2024-01-09 ENCOUNTER — TELEPHONE (OUTPATIENT)
Dept: FAMILY MEDICINE CLINIC | Age: 24
End: 2024-01-09

## 2024-01-09 DIAGNOSIS — Z23 NEED FOR VARICELLA VACCINE: ICD-10-CM

## 2024-01-09 DIAGNOSIS — Z23 NEED FOR HEPATITIS B BOOSTER VACCINATION: ICD-10-CM

## 2024-01-09 DIAGNOSIS — Z11.1 SCREENING FOR TUBERCULOSIS: ICD-10-CM

## 2024-01-09 DIAGNOSIS — Z23 NEED FOR MMR VACCINE: ICD-10-CM

## 2024-01-09 DIAGNOSIS — Z23 NEED FOR INFLUENZA VACCINATION: Primary | ICD-10-CM

## 2024-01-09 NOTE — TELEPHONE ENCOUNTER
I would like verification from the school as to requirements. Im not clear why titers would not work if they show immunity revaccinating would not be any benefit. The hepatiits I am OK with giving but I'd like documentation from the school if possible.

## 2024-01-09 NOTE — TELEPHONE ENCOUNTER
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 He also needs the lab order to be screened for TB. All vaccines are pended for future and lab is pended.

## 2024-01-16 NOTE — TELEPHONE ENCOUNTER
Writer spoke with the patient and he said he no longer needs the vaccines because his mother was able to get the patients vaccination records from his childhood. He just needs to have the TB gold blood work completed.

## 2024-01-22 ENCOUNTER — OFFICE VISIT (OUTPATIENT)
Dept: OBGYN CLINIC | Age: 24
End: 2024-01-22
Payer: COMMERCIAL

## 2024-01-22 ENCOUNTER — HOSPITAL ENCOUNTER (OUTPATIENT)
Age: 24
Setting detail: SPECIMEN
Discharge: HOME | End: 2024-01-22

## 2024-01-22 VITALS
DIASTOLIC BLOOD PRESSURE: 70 MMHG | BODY MASS INDEX: 19.85 KG/M2 | HEIGHT: 68 IN | SYSTOLIC BLOOD PRESSURE: 126 MMHG | WEIGHT: 131 LBS

## 2024-01-22 DIAGNOSIS — F84.0 AUTISM SPECTRUM: ICD-10-CM

## 2024-01-22 DIAGNOSIS — N94.6 DYSMENORRHEA: Primary | ICD-10-CM

## 2024-01-22 DIAGNOSIS — Z11.1 SCREENING FOR TUBERCULOSIS: ICD-10-CM

## 2024-01-22 DIAGNOSIS — Z97.5 IUD (INTRAUTERINE DEVICE) IN PLACE: ICD-10-CM

## 2024-01-22 PROCEDURE — 1036F TOBACCO NON-USER: CPT | Performed by: STUDENT IN AN ORGANIZED HEALTH CARE EDUCATION/TRAINING PROGRAM

## 2024-01-22 PROCEDURE — G8427 DOCREV CUR MEDS BY ELIG CLIN: HCPCS | Performed by: STUDENT IN AN ORGANIZED HEALTH CARE EDUCATION/TRAINING PROGRAM

## 2024-01-22 PROCEDURE — G8484 FLU IMMUNIZE NO ADMIN: HCPCS | Performed by: STUDENT IN AN ORGANIZED HEALTH CARE EDUCATION/TRAINING PROGRAM

## 2024-01-22 PROCEDURE — 99203 OFFICE O/P NEW LOW 30 MIN: CPT | Performed by: STUDENT IN AN ORGANIZED HEALTH CARE EDUCATION/TRAINING PROGRAM

## 2024-01-22 PROCEDURE — G8420 CALC BMI NORM PARAMETERS: HCPCS | Performed by: STUDENT IN AN ORGANIZED HEALTH CARE EDUCATION/TRAINING PROGRAM

## 2024-01-22 NOTE — PROGRESS NOTES
[facility] Problem Visit    Chel Reyes  2024                       Primary Care Physician: Atilio Jenkins APRN - CNP    CC:   Chief Complaint   Patient presents with    Establish Care     IUD placed 3 years ago for endometriosis      Vaginal Bleeding     Having irregular bleeding since may  Increase in pain the last few months.          HPI: Chel Reyes is a 23 y.o. adult      The patient was seen and examined. The patient's pronouns are they/them. Patient is here for vaginal bleeding and dysmenorrhea. Reports history of dymenorrhea and menorrhagia with possible endometriosis that has been well controlled with Kyleena for the past 3 years (placed ). Discussed Mirena vs Kyleena and patient would like kyleena as planning on trying to conceive in 1-2 years with patient's . Last pap 3/2022 NILM.      Patient's last menstrual period was 2023 (approximate)..    Review of Systems:   Constitutional: negative fever, negative chills  Cardiovasc: negative dyspnea, negative cough, negative chest pain,  negative palpitations  Gastrointestinal: _+abdominal pain, negative RUQ pain, negative N/V, negative diarrhea, negative constipation  Genitourinary: negative dysuria, negative vaginal discharge, +VB  Hematologic: negative bruising  Immunologic/Lymphatic: negative recent illness, negative recent sick contact  Musculoskeletal: negative back pain, negative myalgias, negative arthralgias  Neurological:  negative dizziness, negative weakness  Behavior/Psych: negative depression, negative anxiety    Obstetrical History:  OB History    Para Term  AB Living   0 0 0 0 0 0   SAB IAB Ectopic Molar Multiple Live Births   0 0 0 0 0 0       Past Medical History:      Diagnosis Date    Anxiety     Autism spectrum disorder 2019    Bipolar disorder (HCC)     Depression     Dysmenorrhea     Christen-Danlos syndrome type III 2019    GERD (gastroesophageal reflux disease)     Headache

## 2024-01-24 LAB
QUANTI TB GOLD PLUS: NEGATIVE
QUANTI TB1 MINUS NIL: 0.01 IU/ML (ref 0–0.34)
QUANTI TB2 MINUS NIL: 0.01 IU/ML (ref 0–0.34)
QUANTIFERON MITOGEN: >10 IU/ML
QUANTIFERON NIL: 0.02 IU/ML

## 2024-01-29 ENCOUNTER — INITIAL CONSULT (OUTPATIENT)
Dept: PAIN MANAGEMENT | Age: 24
End: 2024-01-29
Payer: COMMERCIAL

## 2024-01-29 VITALS — WEIGHT: 131 LBS | HEIGHT: 68 IN | BODY MASS INDEX: 19.85 KG/M2

## 2024-01-29 DIAGNOSIS — G89.29 OTHER CHRONIC PAIN: ICD-10-CM

## 2024-01-29 DIAGNOSIS — M25.551 BILATERAL HIP PAIN: Primary | ICD-10-CM

## 2024-01-29 DIAGNOSIS — M25.552 BILATERAL HIP PAIN: Primary | ICD-10-CM

## 2024-01-29 PROCEDURE — G8484 FLU IMMUNIZE NO ADMIN: HCPCS | Performed by: PAIN MEDICINE

## 2024-01-29 PROCEDURE — G8427 DOCREV CUR MEDS BY ELIG CLIN: HCPCS | Performed by: PAIN MEDICINE

## 2024-01-29 PROCEDURE — G8420 CALC BMI NORM PARAMETERS: HCPCS | Performed by: PAIN MEDICINE

## 2024-01-29 PROCEDURE — 1036F TOBACCO NON-USER: CPT | Performed by: PAIN MEDICINE

## 2024-01-29 PROCEDURE — 99203 OFFICE O/P NEW LOW 30 MIN: CPT | Performed by: PAIN MEDICINE

## 2024-01-29 RX ORDER — BUSPIRONE HYDROCHLORIDE 10 MG/1
20 TABLET ORAL 2 TIMES DAILY
COMMUNITY

## 2024-01-29 RX ORDER — LAMOTRIGINE 100 MG/1
100 TABLET ORAL DAILY
COMMUNITY

## 2024-01-29 NOTE — PROGRESS NOTES
patient.    OARRS Review: Reviewed and acceptable for medications prescribed.  TREATMENT OPTIONS:     Discussed different treatment options including continued conservative care such as physical therapy, chiropractic care, acupuncture.  Discussed different interventional options .  Also discussed surgical evaluation.    Discussed the importance of continued physical therapy and exercise program as well.    Touch base with orthopedics for the hips.    Continue neurology follow-up for headaches.    Does plan to obtain MRI of the thoracic and cervical spine and follow-up with neurosurgery.      Sheriff Anupama M.D.        I have reviewed the chief complaint and history of present illness (including ROS and PFSH) and vital documentation by my staff and I agree with their documentation and have added where applicable.

## 2024-02-07 ENCOUNTER — TELEPHONE (OUTPATIENT)
Dept: FAMILY MEDICINE CLINIC | Age: 24
End: 2024-02-07

## 2024-02-07 DIAGNOSIS — Z23 NEED FOR VARICELLA VACCINE: Primary | ICD-10-CM

## 2024-02-07 NOTE — TELEPHONE ENCOUNTER
Patient does need the vaccine, it is pended and they may receive it any time they are available as a nurse visit

## 2024-02-08 ENCOUNTER — PROCEDURE VISIT (OUTPATIENT)
Dept: OBGYN CLINIC | Age: 24
End: 2024-02-08

## 2024-02-08 VITALS
DIASTOLIC BLOOD PRESSURE: 68 MMHG | SYSTOLIC BLOOD PRESSURE: 112 MMHG | HEIGHT: 68 IN | WEIGHT: 131 LBS | BODY MASS INDEX: 19.85 KG/M2

## 2024-02-08 DIAGNOSIS — N94.6 DYSMENORRHEA: ICD-10-CM

## 2024-02-08 DIAGNOSIS — Z30.433 ENCOUNTER FOR REMOVAL AND REINSERTION OF INTRAUTERINE CONTRACEPTIVE DEVICE (IUD): Primary | ICD-10-CM

## 2024-02-08 DIAGNOSIS — Z97.5 IUD (INTRAUTERINE DEVICE) IN PLACE: ICD-10-CM

## 2024-02-08 LAB
CONTROL: PRESENT
PREGNANCY TEST URINE, POC: NEGATIVE

## 2024-02-08 NOTE — PROGRESS NOTES
IUD insertion Procedure Note    Chel Reyes  2024                       Primary Care Physician: Atliio Jenkins APRN - [place]      Subjective:   Chel Reyes 23 y.o. adult  is here for previously scheduled IUD removal and reinsertion due to history of dysmenorrhea.  No LMP recorded. (Menstrual status: IUD).. The patient has no complaints today. The patient is known to have painful menses that interfere with her ADLs. The patient has tried NSAIDS in the past without success. The patient wishes to continue to utilize barrier contraception for family planning and STD precautions.  The side affect profile of the IUD was reviewed. All other forms of family planning and options for treatment of her dysmennorhea were reviewed with the patient.     The patient denies use of tobacco products. The patient denies any family member or themself as having a blood clot in their leg, lung, brain or that any member of her family has had a sudden cardiac death under the age of 39 yo.      Vitals:   /68 (Site: Left Upper Arm, Position: Sitting, Cuff Size: Large Adult)   Ht 1.727 m (5' 8\")   Wt 59.4 kg (131 lb)   BMI 19.92 kg/m²     Lab:  Urine Pregnancy Test: Negative     Procedure: Removal of Kleena IUD and reinsertion of Kyleena IUD    Indications: Chel Reyes 23 y.o. adult  has elected for IUD removal and reinsertion for treatment of dysmenorrhea and menorrhagia.    Lot #: [id number]   Expiration date: 2025    Procedure Details:   Chaperone for Intimate Exam: Chaperone was present for entire exam, Chaperone Name: Jeri    The patient was counseled on the procedure. Risks, benefits and alternatives were reviewed. The patient is aware that this is diagnostic and not curative and a second procedure may be needed. A consent was reviewed and obtained.    The patient was positioned comfortably on the exam table.  Patient's partner was present for support. A sterile speculum was placed without

## 2024-02-09 ENCOUNTER — PATIENT MESSAGE (OUTPATIENT)
Dept: FAMILY MEDICINE CLINIC | Age: 24
End: 2024-02-09

## 2024-02-09 ENCOUNTER — NURSE ONLY (OUTPATIENT)
Dept: FAMILY MEDICINE CLINIC | Age: 24
End: 2024-02-09
Payer: COMMERCIAL

## 2024-02-09 VITALS — HEART RATE: 75 BPM | OXYGEN SATURATION: 97 %

## 2024-02-09 DIAGNOSIS — Q79.60 EHLERS-DANLOS SYNDROME: ICD-10-CM

## 2024-02-09 DIAGNOSIS — F41.9 ANXIETY: ICD-10-CM

## 2024-02-09 DIAGNOSIS — N80.9 ENDOMETRIOSIS: ICD-10-CM

## 2024-02-09 DIAGNOSIS — G90.A POTS (POSTURAL ORTHOSTATIC TACHYCARDIA SYNDROME): ICD-10-CM

## 2024-02-09 DIAGNOSIS — F48.9 MOOD PROBLEM: ICD-10-CM

## 2024-02-09 DIAGNOSIS — Z23 NEED FOR VARICELLA VACCINE: ICD-10-CM

## 2024-02-09 DIAGNOSIS — N94.6 DYSMENORRHEA: ICD-10-CM

## 2024-02-09 DIAGNOSIS — Z23 NEEDS FLU SHOT: Primary | ICD-10-CM

## 2024-02-09 DIAGNOSIS — G95.0 SYRINGOMYELIA (HCC): ICD-10-CM

## 2024-02-09 DIAGNOSIS — F32.A DEPRESSION, UNSPECIFIED DEPRESSION TYPE: ICD-10-CM

## 2024-02-09 PROCEDURE — 90674 CCIIV4 VAC NO PRSV 0.5 ML IM: CPT

## 2024-02-09 PROCEDURE — 90471 IMMUNIZATION ADMIN: CPT

## 2024-02-09 NOTE — TELEPHONE ENCOUNTER
From: Chel Reyes  To: Atilio Jenkins  Sent: 2/9/2024 12:58 AM EST  Subject: Refills on Prescriptions    I am at the end of the prescription refills from my last PCP. Just to make sure there are no errors with what is on file, I will type it out    Lamotrigine 100mg once daily  Fludrocortisone 0.1mg once daily  Duloxetine 30mg twice daily  Gabapentin 300mg twice daily  Buspirone two 10mg tablets twice daily    The only one that doesn't need refilled immediately is Baclofen 10mg twice daily. Just make sure the prescriptions are sent in under my legal name \"Chel Reyes.\"    Thank you,  Marie

## 2024-02-09 NOTE — PROGRESS NOTES
Pt is here for Varicella  vaccination.     Pt presents with no complaints.     Varicella vaccine given in right arm.     Tolerated immunization well.     Pt is here for Flu vaccination.     Pt presents with no complaints.     Flu vaccine given in Left arm.     Tolerated immunization well.

## 2024-02-12 RX ORDER — LAMOTRIGINE 100 MG/1
100 TABLET ORAL DAILY
Qty: 30 TABLET | Refills: 3 | Status: SHIPPED | OUTPATIENT
Start: 2024-02-12

## 2024-02-12 RX ORDER — FLUDROCORTISONE ACETATE 0.1 MG/1
0.1 TABLET ORAL DAILY
Qty: 30 TABLET | Refills: 5 | Status: SHIPPED | OUTPATIENT
Start: 2024-02-12

## 2024-02-12 RX ORDER — BUSPIRONE HYDROCHLORIDE 10 MG/1
20 TABLET ORAL 2 TIMES DAILY
Qty: 60 TABLET | Refills: 5 | Status: SHIPPED | OUTPATIENT
Start: 2024-02-12

## 2024-02-12 RX ORDER — DULOXETIN HYDROCHLORIDE 30 MG/1
30 CAPSULE, DELAYED RELEASE ORAL 2 TIMES DAILY
Qty: 180 CAPSULE | Refills: 1 | Status: SHIPPED | OUTPATIENT
Start: 2024-02-12 | End: 2025-02-12

## 2024-02-12 RX ORDER — GABAPENTIN 300 MG/1
300 CAPSULE ORAL 2 TIMES DAILY
Qty: 60 CAPSULE | Refills: 5 | Status: SHIPPED | OUTPATIENT
Start: 2024-02-12 | End: 2025-02-12

## 2024-02-19 ENCOUNTER — PATIENT MESSAGE (OUTPATIENT)
Dept: FAMILY MEDICINE CLINIC | Age: 24
End: 2024-02-19

## 2024-02-19 NOTE — TELEPHONE ENCOUNTER
From: Chel Reyes  To: Atilio Jenkins  Sent: 2/19/2024 4:14 PM EST  Subject: TB Test    The first name is wrong so it is being rejected

## 2024-02-19 NOTE — TELEPHONE ENCOUNTER
Patient advised to contact [facility].  AMB will also contact lab to inquire if a form is necessary to be completed from pcp.

## 2024-02-20 ENCOUNTER — PATIENT MESSAGE (OUTPATIENT)
Dept: FAMILY MEDICINE CLINIC | Age: 24
End: 2024-02-20

## 2024-02-20 ENCOUNTER — TELEPHONE (OUTPATIENT)
Dept: NEUROLOGY | Age: 24
End: 2024-02-20

## 2024-02-20 DIAGNOSIS — Z11.1 SCREENING FOR TUBERCULOSIS: Primary | ICD-10-CM

## 2024-02-20 NOTE — TELEPHONE ENCOUNTER
From: Chel Reyes  To: Atilio Jenkins  Sent: 2/20/2024 3:27 PM EST  Subject: TB Test for Clinical Requirements     I have been working with the  about trying to get the name fixed on my TB test results. This has been ongoing for the past two weeks, and they haven't been able to get it fixed (they have been super proactive, the issue is the lab that made the error is saying they can't fix it without a bunch of paperwork and yet they haven't done their part to send it to y'all). I am going to need it reordered (I will be stopping in at your office lab to get it drawn this time, so no mistakes happen) so I can get the proper paperwork by Friday. If the insurance doesn't want to pay for a second test, I may be able to talk it out with them so don't worry about that.     Thank you,  Chel \"Marie\" Amy

## 2024-02-27 DIAGNOSIS — M25.552 BILATERAL HIP PAIN: Primary | ICD-10-CM

## 2024-02-27 DIAGNOSIS — M25.551 BILATERAL HIP PAIN: Primary | ICD-10-CM

## 2024-02-28 ENCOUNTER — OFFICE VISIT (OUTPATIENT)
Dept: ORTHOPEDIC SURGERY | Age: 24
End: 2024-02-28

## 2024-02-28 VITALS — WEIGHT: 131 LBS | HEIGHT: 68 IN | RESPIRATION RATE: 15 BRPM | OXYGEN SATURATION: 100 % | BODY MASS INDEX: 19.85 KG/M2

## 2024-02-28 DIAGNOSIS — M25.552 LEFT HIP PAIN: Primary | ICD-10-CM

## 2024-02-28 RX ORDER — METHYLPREDNISOLONE 4 MG/1
TABLET ORAL
Qty: 1 KIT | Refills: 0 | Status: SHIPPED | OUTPATIENT
Start: 2024-02-28

## 2024-03-02 ENCOUNTER — HOSPITAL ENCOUNTER (OUTPATIENT)
Dept: MRI IMAGING | Age: 24
End: 2024-03-02
Payer: COMMERCIAL

## 2024-03-02 DIAGNOSIS — G95.0 SYRINX OF SPINAL CORD (HCC): ICD-10-CM

## 2024-03-02 DIAGNOSIS — M54.9 UPPER BACK PAIN: ICD-10-CM

## 2024-03-02 PROCEDURE — 72141 MRI NECK SPINE W/O DYE: CPT

## 2024-03-02 PROCEDURE — 72146 MRI CHEST SPINE W/O DYE: CPT

## 2024-03-02 ASSESSMENT — ENCOUNTER SYMPTOMS
ROS SKIN COMMENTS: NEGATIVE FOR RASH
SHORTNESS OF BREATH: 0
EYE DISCHARGE: 0
ABDOMINAL PAIN: 0

## 2024-03-02 NOTE — PROGRESS NOTES
[facility] ORTHOPEDICS AND SPORTS MEDICINE  [address]  [place] [facility] [place] [address]  Dept: [phone]  Dept Fax: [phone]        Ambulatory Follow Up      Subjective:   Chel Reyes is a 23 y.o. year old adult who presents to our office today for routine followup regarding Chel Reyes \"Marie\"'s   1. Left hip pain    .    Chief Complaint   Patient presents with    Hip Pain     Bilateral hip pain       HPI  Marie Reyes is a 22-year-old female presents the office today for recheck in March 2021 and September 2021 she underwent bilateral periacetabular osteotomies and the rotational osteotomies bilateral femurs.  She continues to have some left hip pain.  Otherwise she is very happy with the results bilaterally.  After her left hip surgery she had a complication where she fell off the bed and dislocated her hip and she had repeat surgery for capsular repair.  Both hips feel stable now    Review of Systems   Constitutional:  Positive for activity change. Negative for fever.   HENT:  Negative for dental problem.    Eyes:  Negative for discharge.   Respiratory:  Negative for shortness of breath.    Cardiovascular:  Negative for chest pain.   Gastrointestinal:  Negative for abdominal pain.   Genitourinary: Negative.    Musculoskeletal:  Positive for arthralgias.   Skin:         Negative for rash   Neurological:  Positive for weakness.   Psychiatric/Behavioral:  Negative for confusion.        I have reviewed the CC, HPI, ROS, PMH, FHX, Social History, and if not present in this note, I have reviewed in the patient's chart.   I agree with the documentation provided by other staff and have reviewed their documentation prior to providing my signature indicating agreement.    Objective :   Resp 15   Ht 1.727 m (5' 8\")   Wt 59.4 kg (131 lb)   SpO2 100%   BMI 19.92 kg/m²  Body mass index is 19.92 kg/m².  General: Chel Reyes is a 23  normal...

## 2024-03-04 RX ORDER — BUSPIRONE HYDROCHLORIDE 10 MG/1
10 TABLET ORAL 2 TIMES DAILY
Qty: 60 TABLET | Refills: 5 | OUTPATIENT
Start: 2024-03-04

## 2024-03-17 ENCOUNTER — PATIENT MESSAGE (OUTPATIENT)
Dept: FAMILY MEDICINE CLINIC | Age: 24
End: 2024-03-17

## 2024-03-17 DIAGNOSIS — M62.838 MUSCLE SPASM: ICD-10-CM

## 2024-03-17 DIAGNOSIS — G90.A POTS (POSTURAL ORTHOSTATIC TACHYCARDIA SYNDROME): ICD-10-CM

## 2024-03-17 DIAGNOSIS — K59.09 CHRONIC CONSTIPATION: ICD-10-CM

## 2024-03-17 DIAGNOSIS — Q79.62 HYPERMOBILE EHLERS-DANLOS SYNDROME: ICD-10-CM

## 2024-03-17 DIAGNOSIS — M25.50 ARTHRALGIA, UNSPECIFIED JOINT: ICD-10-CM

## 2024-03-17 DIAGNOSIS — F39 MOOD DISORDER (HCC): ICD-10-CM

## 2024-03-17 DIAGNOSIS — N92.1 MENORRHAGIA WITH IRREGULAR CYCLE: ICD-10-CM

## 2024-03-17 DIAGNOSIS — F41.9 ANXIETY: ICD-10-CM

## 2024-03-17 DIAGNOSIS — N94.6 DYSMENORRHEA: ICD-10-CM

## 2024-03-17 DIAGNOSIS — F32.A DEPRESSION, UNSPECIFIED DEPRESSION TYPE: ICD-10-CM

## 2024-03-18 RX ORDER — BACLOFEN 10 MG/1
10 TABLET ORAL 2 TIMES DAILY
Qty: 60 TABLET | Refills: 2 | OUTPATIENT
Start: 2024-03-18 | End: 2024-06-16

## 2024-03-18 RX ORDER — BUSPIRONE HYDROCHLORIDE 10 MG/1
20 TABLET ORAL 2 TIMES DAILY
Qty: 120 TABLET | Refills: 5 | Status: SHIPPED | OUTPATIENT
Start: 2024-03-18

## 2024-03-18 NOTE — TELEPHONE ENCOUNTER
From: Chel Reyes  To: Atilio Jenkins  Sent: 3/17/2024 2:28 PM EDT  Subject: Baclofen    I'm not sure what happened but I haven't been getting refills on my baclofen (10 mg twice a day) and it seems to have been mysteriously taken off my prescription list, while other medications I don't take (like zonisamide), are listed on there. I have been using my built up over supply that accumulated from missed doses over the past year, but now I'm almost out and still not getting it in my prescription refills. (I had assumed maybe there was an issue and since I wasn't in urgent need, I figured maybe it would be fine next month) I have been rationing for the past couple days and I definitely still need to be on it, muscle spasms are a problem for me, being a problem again while I'm rationing. I also don't know why, but my buspirone is getting filled twice a month in two week supply periods instead of monthly. I get anxiety about potential interruptions in my medication supply, so I just worry about having to get it twice a month instead of monthly.

## 2024-03-27 DIAGNOSIS — M25.552 LEFT HIP PAIN: ICD-10-CM

## 2024-03-28 RX ORDER — METHYLPREDNISOLONE 4 MG/1
TABLET ORAL
Qty: 1 KIT | Refills: 0 | OUTPATIENT
Start: 2024-03-28

## 2024-03-28 RX ORDER — GABAPENTIN 300 MG/1
300 CAPSULE ORAL 2 TIMES DAILY
Qty: 60 CAPSULE | Refills: 5 | OUTPATIENT
Start: 2024-03-28 | End: 2025-03-29

## 2024-04-02 DIAGNOSIS — G43.709 CHRONIC MIGRAINE WITHOUT AURA WITHOUT STATUS MIGRAINOSUS, NOT INTRACTABLE: Primary | ICD-10-CM

## 2024-04-02 RX ORDER — RIZATRIPTAN BENZOATE 10 MG/1
TABLET ORAL
Qty: 7 TABLET | Refills: 2 | Status: SHIPPED | OUTPATIENT
Start: 2024-04-02

## 2024-04-08 DIAGNOSIS — M25.552 LEFT HIP PAIN: ICD-10-CM

## 2024-04-08 RX ORDER — METHYLPREDNISOLONE 4 MG/1
TABLET ORAL
Qty: 1 KIT | Refills: 0 | OUTPATIENT
Start: 2024-04-08

## 2024-04-08 NOTE — TELEPHONE ENCOUNTER
Called and LVM for the patient to give us a call back to get scheduled with Yomaira Rangel PA-C on Wednesday 4/17/24 or Thursday 4/18/24 as these two days have more openings available for the patient's convenience for a hip injection.    The patient may give us a call back if they are willing to be scheduled with Yomaira.

## 2024-04-16 ENCOUNTER — OFFICE VISIT (OUTPATIENT)
Dept: NEUROLOGY | Age: 24
End: 2024-04-16

## 2024-04-16 VITALS
SYSTOLIC BLOOD PRESSURE: 116 MMHG | HEIGHT: 68 IN | BODY MASS INDEX: 21.07 KG/M2 | WEIGHT: 139 LBS | DIASTOLIC BLOOD PRESSURE: 77 MMHG | HEART RATE: 81 BPM

## 2024-04-16 DIAGNOSIS — G43.709 CHRONIC MIGRAINE WITHOUT AURA WITHOUT STATUS MIGRAINOSUS, NOT INTRACTABLE: Primary | ICD-10-CM

## 2024-04-16 RX ORDER — UBROGEPANT 100 MG/1
100 TABLET ORAL PRN
Qty: 16 TABLET | Refills: 3 | Status: SHIPPED | OUTPATIENT
Start: 2024-04-16

## 2024-04-16 RX ORDER — SUMATRIPTAN 50 MG/1
50 TABLET, FILM COATED ORAL
Qty: 9 TABLET | Refills: 0 | Status: CANCELLED | OUTPATIENT
Start: 2024-04-16 | End: 2024-04-16

## 2024-04-16 ASSESSMENT — ENCOUNTER SYMPTOMS
RHINORRHEA: 0
SHORTNESS OF BREATH: 0
CHOKING: 0
CONSTIPATION: 0
WHEEZING: 0
SORE THROAT: 0
COUGH: 0
ABDOMINAL DISTENTION: 0
VOMITING: 0
NAUSEA: 0
COLOR CHANGE: 0
DIARRHEA: 0
BACK PAIN: 0
PHOTOPHOBIA: 0
ABDOMINAL PAIN: 0

## 2024-04-16 NOTE — PROGRESS NOTES
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 Didn't tolerate Zonisamide due to depression. Forva was not approved, no clear reasons.    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 The patients reports experiencing migraines since age 11. She reports her migraines are frequent occurring 4-7 times a months. The migraines are triggered by strong scents. She reports migraines are around her whole head, and describes them as a pressure like sensation. She reports photophobia, phonophobia, and nausea. She also reports feeling increased brain fog and irritable mood during migraines. She reports neck tension, and occasional paresthesias in her right arm associated with the migraines. She experiences an aura before each migraine, described as black specs in her visual field. Her migraines are relieved by Ubrelvy, however her insurance no longer covers it. She occasionally takes NSAIDS, however she has a history of stomach

## 2024-04-17 ENCOUNTER — OFFICE VISIT (OUTPATIENT)
Dept: ORTHOPEDIC SURGERY | Age: 24
End: 2024-04-17

## 2024-04-17 VITALS — BODY MASS INDEX: 21.13 KG/M2 | OXYGEN SATURATION: 98 % | WEIGHT: 139.4 LBS | HEIGHT: 68 IN | RESPIRATION RATE: 16 BRPM

## 2024-04-17 DIAGNOSIS — M24.151 DEGENERATIVE TEAR OF ACETABULAR LABRUM OF RIGHT HIP: Primary | ICD-10-CM

## 2024-04-17 RX ORDER — LIDOCAINE HYDROCHLORIDE 10 MG/ML
2 INJECTION, SOLUTION INFILTRATION; PERINEURAL ONCE
Status: COMPLETED | OUTPATIENT
Start: 2024-04-17 | End: 2024-04-18

## 2024-04-17 RX ORDER — METHYLPREDNISOLONE ACETATE 40 MG/ML
40 INJECTION, SUSPENSION INTRA-ARTICULAR; INTRALESIONAL; INTRAMUSCULAR; SOFT TISSUE ONCE
Status: COMPLETED | OUTPATIENT
Start: 2024-04-17 | End: 2024-04-18

## 2024-04-17 ASSESSMENT — ENCOUNTER SYMPTOMS
GASTROINTESTINAL NEGATIVE: 1
COLOR CHANGE: 0
ABDOMINAL DISTENTION: 0
DIARRHEA: 0
RESPIRATORY NEGATIVE: 1
CHEST TIGHTNESS: 0
NAUSEA: 0
ABDOMINAL PAIN: 0
APNEA: 0
VOMITING: 0
CONSTIPATION: 0
SHORTNESS OF BREATH: 0
COUGH: 0

## 2024-04-17 NOTE — PROGRESS NOTES
[facility] ORTHOPEDICS AND SPORTS MEDICINE  [address]  [place] [address]  Dept: [phone]  Dept Fax: [phone]        Ambulatory Follow Up      Subjective:   Chel Reyes is a 23 y.o. year old adult who presents to our office today for routine followup regarding Chel Reyes \"Marie\"'s   1. 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    Chief Complaint   Patient presents with    Hip Pain     Right Hip Pain-Treatment Options Discussion-Inj Req       HPI Chel Reyes  is a 23 y.o.  adult who presents today in follow for right hip pain.  The patient was last seen on 2/28/2024 by Dr. Quintin Juarez DO and underwent treatment in the form of for a Medrol Dosepak for rescue pain if needed.  The patient is unable to take NSAIDs.  She was told to continue her home exercise program.  The patient has had a bilateral shanelle-acetabular osteotomies and the rotational osteotomies of bilateral femurs in March 2021 and September 2021.  The patient is also in pain management.  The patient has a history of Ehler-Danlos syndrome.  She has been taking tylenol and tramadol. She has had to leave work early.   She presents today with 1 crutch.    Review of Systems   Constitutional:  Positive for activity change. Negative for appetite change, fatigue and fever.   Respiratory: Negative.  Negative for apnea, cough, chest tightness and shortness of breath.    Cardiovascular: Negative.  Negative for chest pain, palpitations and leg swelling.   Gastrointestinal: Negative.  Negative for abdominal distention, abdominal pain, constipation, diarrhea, nausea and vomiting.   Genitourinary: Negative.  Negative for difficulty urinating, dysuria and hematuria.   Musculoskeletal:  Positive for arthralgias and gait problem. Negative for joint swelling and myalgias.   Skin: Negative.  Negative for color change and rash.   Neurological:  Negative

## 2024-04-18 RX ADMIN — METHYLPREDNISOLONE ACETATE 40 MG: 40 INJECTION, SUSPENSION INTRA-ARTICULAR; INTRALESIONAL; INTRAMUSCULAR; SOFT TISSUE at 09:15

## 2024-04-18 RX ADMIN — LIDOCAINE HYDROCHLORIDE 2 ML: 10 INJECTION, SOLUTION INFILTRATION; PERINEURAL at 09:15

## 2024-04-23 DIAGNOSIS — Q79.60 EHLERS-DANLOS SYNDROME: Primary | ICD-10-CM

## 2024-04-23 RX ORDER — BACLOFEN 10 MG/1
10 TABLET ORAL 2 TIMES DAILY
Qty: 60 TABLET | Refills: 3 | Status: SHIPPED | OUTPATIENT
Start: 2024-04-23 | End: 2025-04-23

## 2024-05-29 DIAGNOSIS — G43.709 CHRONIC MIGRAINE WITHOUT AURA WITHOUT STATUS MIGRAINOSUS, NOT INTRACTABLE: ICD-10-CM

## 2024-05-29 NOTE — TELEPHONE ENCOUNTER
E-scribe requesting refill for Ubrelvy. Please review and e-scribe if applicable.     Last Visit Date: 04/16/2024    Next Visit Date:07/30/2024

## 2024-05-30 RX ORDER — UBROGEPANT 100 MG/1
100 TABLET ORAL PRN
Qty: 16 TABLET | Refills: 3 | Status: SHIPPED | OUTPATIENT
Start: 2024-05-30

## 2024-06-25 ENCOUNTER — TELEPHONE (OUTPATIENT)
Dept: NEUROLOGY | Age: 24
End: 2024-06-25

## 2024-06-25 NOTE — TELEPHONE ENCOUNTER
Pt called and stated ajovy was denied and they received the denied letter in the mail. I do not see the denial letter in pt chart. Pt stated they will upload the letter in [employer]. Please advise how to move forward with this

## 2024-07-01 RX ORDER — BUSPIRONE HYDROCHLORIDE 10 MG/1
20 TABLET ORAL 2 TIMES DAILY
Qty: 270 TABLET | Refills: 3 | Status: SHIPPED | OUTPATIENT
Start: 2024-07-01 | End: 2025-07-01

## 2024-07-01 NOTE — TELEPHONE ENCOUNTER
LOV 1/8/24   RTO in a year  LRF 3/18/24          Controlled Substance Monitoring:    Acute and Chronic Pain Monitoring:        No data to display

## 2024-07-01 NOTE — TELEPHONE ENCOUNTER
LOV 2/9/24   RTO 7/17/24 with new provider  LRF 2/12/24          Controlled Substance Monitoring:    Acute and Chronic Pain Monitoring:        No data to display

## 2024-07-02 RX ORDER — LAMOTRIGINE 100 MG/1
100 TABLET ORAL DAILY
Qty: 30 TABLET | Refills: 3 | Status: SHIPPED | OUTPATIENT
Start: 2024-07-02

## 2024-07-05 NOTE — TELEPHONE ENCOUNTER
LOV 01/08/2024   RTO 01/09/2025  LRF 02/12/2024          Controlled Substance Monitoring:    Acute and Chronic Pain Monitoring:        No data to display

## 2024-07-06 RX ORDER — GABAPENTIN 300 MG/1
300 CAPSULE ORAL 2 TIMES DAILY
Qty: 60 CAPSULE | Refills: 5 | Status: SHIPPED | OUTPATIENT
Start: 2024-07-06 | End: 2025-07-07

## 2024-07-08 NOTE — TELEPHONE ENCOUNTER
Please see note from patient.  There is conflicting information in the sig and quantity of this prescription.  Please correct (verify with patient if necessary) and resend for approval.

## 2024-07-08 NOTE — TELEPHONE ENCOUNTER
LOV 1/8/24   RTO 7/17/24 with another office   LRF 7/1/24    Message from pt.   Conflicting instructions, should just be 2 tablets twice a day, also, ensure sent to [facility] not [facility]       Controlled Substance Monitoring:    Acute and Chronic Pain Monitoring:        No data to display

## 2024-07-09 RX ORDER — BUSPIRONE HYDROCHLORIDE 10 MG/1
20 TABLET ORAL 2 TIMES DAILY
Qty: 270 TABLET | Refills: 3 | Status: SHIPPED | OUTPATIENT
Start: 2024-07-09 | End: 2025-07-09

## 2024-07-11 ENCOUNTER — TELEPHONE (OUTPATIENT)
Dept: NEUROLOGY | Age: 24
End: 2024-07-11

## 2024-07-11 DIAGNOSIS — G43.709 CHRONIC MIGRAINE WITHOUT AURA WITHOUT STATUS MIGRAINOSUS, NOT INTRACTABLE: Primary | ICD-10-CM

## 2024-07-11 RX ORDER — RIMEGEPANT SULFATE 75 MG/75MG
75 TABLET, ORALLY DISINTEGRATING ORAL PRN
Qty: 15 TABLET | Refills: 3 | Status: SHIPPED | OUTPATIENT
Start: 2024-07-11

## 2024-07-11 RX ORDER — ERENUMAB-AOOE 140 MG/ML
140 INJECTION, SOLUTION SUBCUTANEOUS
Qty: 12 ADJUSTABLE DOSE PRE-FILLED PEN SYRINGE | Refills: 1 | Status: SHIPPED | OUTPATIENT
Start: 2024-07-11 | End: 2025-06-07

## 2024-07-17 ENCOUNTER — OFFICE VISIT (OUTPATIENT)
Dept: INTERNAL MEDICINE | Age: 24
End: 2024-07-17
Payer: COMMERCIAL

## 2024-07-17 VITALS
OXYGEN SATURATION: 99 % | BODY MASS INDEX: 20.76 KG/M2 | HEIGHT: 68 IN | TEMPERATURE: 97.3 F | RESPIRATION RATE: 16 BRPM | DIASTOLIC BLOOD PRESSURE: 80 MMHG | WEIGHT: 137 LBS | HEART RATE: 96 BPM | SYSTOLIC BLOOD PRESSURE: 124 MMHG

## 2024-07-17 DIAGNOSIS — Z97.5 IUD (INTRAUTERINE DEVICE) IN PLACE: ICD-10-CM

## 2024-07-17 DIAGNOSIS — M24.80 GENERALIZED HYPERMOBILITY OF JOINTS: ICD-10-CM

## 2024-07-17 DIAGNOSIS — F32.5 MAJOR DEPRESSIVE DISORDER IN FULL REMISSION, UNSPECIFIED WHETHER RECURRENT (HCC): ICD-10-CM

## 2024-07-17 DIAGNOSIS — Z98.890 STATUS POST ARTHROSCOPY OF HIP: ICD-10-CM

## 2024-07-17 DIAGNOSIS — G43.909 MIGRAINE WITHOUT STATUS MIGRAINOSUS, NOT INTRACTABLE, UNSPECIFIED MIGRAINE TYPE: ICD-10-CM

## 2024-07-17 DIAGNOSIS — G95.0 SYRINX OF SPINAL CORD (HCC): ICD-10-CM

## 2024-07-17 DIAGNOSIS — Q79.60 EHLERS-DANLOS SYNDROME: Primary | ICD-10-CM

## 2024-07-17 DIAGNOSIS — G90.A POTS (POSTURAL ORTHOSTATIC TACHYCARDIA SYNDROME): ICD-10-CM

## 2024-07-17 DIAGNOSIS — F84.0 AUTISM SPECTRUM: ICD-10-CM

## 2024-07-17 DIAGNOSIS — F41.9 ANXIETY: ICD-10-CM

## 2024-07-17 PROBLEM — D50.9 IRON DEFICIENCY ANEMIA: Status: RESOLVED | Noted: 2021-06-04 | Resolved: 2024-07-17

## 2024-07-17 PROBLEM — K31.84 GASTROPARESIS: Status: RESOLVED | Noted: 2021-05-03 | Resolved: 2024-07-17

## 2024-07-17 PROBLEM — K64.8 INTERNAL HEMORRHOIDS: Status: RESOLVED | Noted: 2022-05-09 | Resolved: 2024-07-17

## 2024-07-17 PROCEDURE — G8420 CALC BMI NORM PARAMETERS: HCPCS | Performed by: STUDENT IN AN ORGANIZED HEALTH CARE EDUCATION/TRAINING PROGRAM

## 2024-07-17 PROCEDURE — 99204 OFFICE O/P NEW MOD 45 MIN: CPT | Performed by: STUDENT IN AN ORGANIZED HEALTH CARE EDUCATION/TRAINING PROGRAM

## 2024-07-17 PROCEDURE — G8427 DOCREV CUR MEDS BY ELIG CLIN: HCPCS | Performed by: STUDENT IN AN ORGANIZED HEALTH CARE EDUCATION/TRAINING PROGRAM

## 2024-07-17 PROCEDURE — 1036F TOBACCO NON-USER: CPT | Performed by: STUDENT IN AN ORGANIZED HEALTH CARE EDUCATION/TRAINING PROGRAM

## 2024-07-17 NOTE — PROGRESS NOTES
2023-24 season) 09/01/2023    Varicella vaccine (2 of 2 - 13+ 2-dose series) 03/08/2024       ASSESSMENT AND PLAN:      Diagnoses and all orders for this visit:  #Christen-Danlos syndrome:   Developmental dysplasia of hip s/p multiple surgeries   Follows with DR Larry Darby with cane  Received intra-articular steroid injection in April 2024    #POTS (postural orthostatic tachycardia syndrome):  Is on fludrocortisone  BP is stable     #IUD (intrauterine device) in place  #Status post arthroscopy of hip  #Autism spectrum  #Major depressive disorder in full remission, unspecified whether recurrent (HCC) with anxiety:  Is on lamotrigine, Cymbalta 30 BID and Buspar 10 mg BID    #Syrinx of spinal cord (HCC): PLAN to follow up with neurosurgery    #Migraine without status migrainosus, not intractable, unspecified migraine type  Follows with neurology  Started on Nurtec and Reji  Has appointment with neurology on July 30th      1.  Chel received counseling on the following healthy behaviors: nutrition and exercise    2.  Reviewed prior labs and health maintenance.      3.  Discussed use, benefit, and side effects of prescribed medications.  Barriers to medication compliance addressed.  All patient questions answered.  Pt voiced understanding.     4.  Continue current medications, diet and exercise.        Patient voiced understanding and agreed to treatment plan.     This note is created with the assistance of a speech-recognition program. While intending to generate a document that actually reflects the content of the visit, the document can still have some mistakes which may not have been identified and corrected by editing.      Electronically signed by:  Kavitha Marvin MD  7/17/2024

## 2024-07-22 ENCOUNTER — TELEPHONE (OUTPATIENT)
Dept: NEUROSURGERY | Age: 24
End: 2024-07-22

## 2024-07-22 NOTE — TELEPHONE ENCOUNTER
Patient wanted to know about the medication Ajovy. She is aware that she was denied for it. But she really wants to try it instead of the other medications. She wants to know can we try for it again?    Please Advise

## 2024-07-23 DIAGNOSIS — G43.709 CHRONIC MIGRAINE WITHOUT AURA WITHOUT STATUS MIGRAINOSUS, NOT INTRACTABLE: ICD-10-CM

## 2024-07-24 PROBLEM — G95.0 SYRINX OF SPINAL CORD (HCC): Status: ACTIVE | Noted: 2024-07-24

## 2024-07-24 PROBLEM — F32.5 MAJOR DEPRESSIVE DISORDER IN FULL REMISSION (HCC): Status: ACTIVE | Noted: 2024-07-24

## 2024-07-24 PROBLEM — G43.909 MIGRAINE WITHOUT STATUS MIGRAINOSUS, NOT INTRACTABLE: Status: ACTIVE | Noted: 2024-07-24

## 2024-07-24 PROBLEM — R00.0 TACHYCARDIA: Status: RESOLVED | Noted: 2023-05-19 | Resolved: 2024-07-24

## 2024-07-24 ASSESSMENT — ENCOUNTER SYMPTOMS
COUGH: 0
ABDOMINAL PAIN: 0
SHORTNESS OF BREATH: 0
ROS SKIN COMMENTS: EASY BRUISING
WHEEZING: 0
DIARRHEA: 0
VOMITING: 0
CHEST TIGHTNESS: 0
CONSTIPATION: 0
NAUSEA: 0

## 2024-07-25 DIAGNOSIS — F32.A DEPRESSION, UNSPECIFIED DEPRESSION TYPE: ICD-10-CM

## 2024-07-25 DIAGNOSIS — Q79.60 EHLERS-DANLOS SYNDROME: ICD-10-CM

## 2024-07-25 DIAGNOSIS — F48.9 MOOD PROBLEM: ICD-10-CM

## 2024-07-25 DIAGNOSIS — G95.0 SYRINGOMYELIA (HCC): ICD-10-CM

## 2024-07-25 DIAGNOSIS — F41.9 ANXIETY: ICD-10-CM

## 2024-07-25 DIAGNOSIS — G90.A POTS (POSTURAL ORTHOSTATIC TACHYCARDIA SYNDROME): ICD-10-CM

## 2024-07-25 DIAGNOSIS — N94.6 DYSMENORRHEA: ICD-10-CM

## 2024-07-25 DIAGNOSIS — N80.9 ENDOMETRIOSIS: ICD-10-CM

## 2024-07-25 NOTE — TELEPHONE ENCOUNTER
----- Message from Chel Reyes sent at 7/25/2024  2:18 PM EDT -----  Regarding: Refill  Contact: [phone]  I need a refill of my Duloxetine 30 mg BID sent to [facility] in [place] [id number]    I also need a refill of baclofen 10 mg BID, and Gabapentin 300 mg BID

## 2024-07-26 RX ORDER — GABAPENTIN 300 MG/1
300 CAPSULE ORAL 2 TIMES DAILY
Qty: 60 CAPSULE | Refills: 1 | Status: SHIPPED | OUTPATIENT
Start: 2024-07-26 | End: 2025-07-27

## 2024-07-26 RX ORDER — DULOXETIN HYDROCHLORIDE 30 MG/1
30 CAPSULE, DELAYED RELEASE ORAL 2 TIMES DAILY
Qty: 90 CAPSULE | Refills: 1 | Status: SHIPPED | OUTPATIENT
Start: 2024-07-26 | End: 2025-07-27

## 2024-07-26 RX ORDER — BACLOFEN 10 MG/1
10 TABLET ORAL 2 TIMES DAILY PRN
Qty: 60 TABLET | Refills: 1 | Status: SHIPPED | OUTPATIENT
Start: 2024-07-26 | End: 2025-07-26

## 2024-07-30 ENCOUNTER — OFFICE VISIT (OUTPATIENT)
Dept: NEUROLOGY | Age: 24
End: 2024-07-30

## 2024-07-30 VITALS
DIASTOLIC BLOOD PRESSURE: 78 MMHG | HEIGHT: 68 IN | SYSTOLIC BLOOD PRESSURE: 112 MMHG | HEART RATE: 85 BPM | OXYGEN SATURATION: 97 % | BODY MASS INDEX: 20.76 KG/M2 | WEIGHT: 137 LBS

## 2024-07-30 DIAGNOSIS — G43.709 CHRONIC MIGRAINE WITHOUT AURA WITHOUT STATUS MIGRAINOSUS, NOT INTRACTABLE: Primary | ICD-10-CM

## 2024-07-30 RX ORDER — SUMATRIPTAN 100 MG/1
100 TABLET, FILM COATED ORAL
Qty: 9 TABLET | Refills: 3 | Status: SHIPPED | OUTPATIENT
Start: 2024-07-30 | End: 2024-08-05 | Stop reason: SDUPTHER

## 2024-08-02 ENCOUNTER — TELEPHONE (OUTPATIENT)
Dept: NEUROLOGY | Age: 24
End: 2024-08-02

## 2024-08-05 DIAGNOSIS — G43.709 CHRONIC MIGRAINE WITHOUT AURA WITHOUT STATUS MIGRAINOSUS, NOT INTRACTABLE: ICD-10-CM

## 2024-08-05 RX ORDER — SUMATRIPTAN 100 MG/1
100 TABLET, FILM COATED ORAL
Qty: 9 TABLET | Refills: 3 | Status: SHIPPED | OUTPATIENT
Start: 2024-08-05 | End: 2024-08-14 | Stop reason: SINTOL

## 2024-08-14 DIAGNOSIS — G43.709 CHRONIC MIGRAINE WITHOUT AURA WITHOUT STATUS MIGRAINOSUS, NOT INTRACTABLE: Primary | ICD-10-CM

## 2024-08-14 RX ORDER — BUTALBITAL, ACETAMINOPHEN AND CAFFEINE 50; 325; 40 MG/1; MG/1; MG/1
1 TABLET ORAL EVERY 6 HOURS PRN
Qty: 30 TABLET | Refills: 3 | Status: SHIPPED | OUTPATIENT
Start: 2024-08-14

## 2024-08-18 ENCOUNTER — PATIENT MESSAGE (OUTPATIENT)
Dept: INTERNAL MEDICINE | Age: 24
End: 2024-08-18

## 2024-08-18 DIAGNOSIS — N80.9 ENDOMETRIOSIS: ICD-10-CM

## 2024-08-18 DIAGNOSIS — G95.0 SYRINGOMYELIA (HCC): ICD-10-CM

## 2024-08-18 DIAGNOSIS — Q79.60 EHLERS-DANLOS SYNDROME: ICD-10-CM

## 2024-08-18 DIAGNOSIS — G90.A POTS (POSTURAL ORTHOSTATIC TACHYCARDIA SYNDROME): ICD-10-CM

## 2024-08-18 DIAGNOSIS — F32.A DEPRESSION, UNSPECIFIED DEPRESSION TYPE: ICD-10-CM

## 2024-08-18 DIAGNOSIS — F48.9 MOOD PROBLEM: ICD-10-CM

## 2024-08-18 DIAGNOSIS — F41.9 ANXIETY: ICD-10-CM

## 2024-08-18 DIAGNOSIS — N94.6 DYSMENORRHEA: ICD-10-CM

## 2024-08-19 NOTE — TELEPHONE ENCOUNTER
Chel Reyes is calling to request a refill on the following medication(s):    Medication Request:  Requested Prescriptions     Pending Prescriptions Disp Refills    fludrocortisone (FLORINEF) 0.1 MG tablet 30 tablet 5     Sig: Take 1 tablet by mouth daily Take one pill once daily    DULoxetine (CYMBALTA) 30 MG extended release capsule 90 capsule 1     Sig: Take 1 capsule by mouth 2 times daily       Last Visit Date (If Applicable):  7/17/2024    Next Visit Date:    Visit date not found

## 2024-08-20 RX ORDER — DULOXETIN HYDROCHLORIDE 30 MG/1
30 CAPSULE, DELAYED RELEASE ORAL 2 TIMES DAILY
Qty: 60 CAPSULE | Refills: 2 | Status: SHIPPED | OUTPATIENT
Start: 2024-08-20 | End: 2024-11-18

## 2024-08-20 RX ORDER — FLUDROCORTISONE ACETATE 0.1 MG/1
0.1 TABLET ORAL DAILY
Qty: 30 TABLET | Refills: 5 | Status: SHIPPED | OUTPATIENT
Start: 2024-08-20

## 2024-09-26 ENCOUNTER — HOSPITAL ENCOUNTER (OUTPATIENT)
Dept: PHYSICAL THERAPY | Facility: CLINIC | Age: 24
Setting detail: THERAPIES SERIES
Discharge: HOME | End: 2024-09-26

## 2024-10-01 RX ORDER — GABAPENTIN 300 MG/1
300 CAPSULE ORAL 2 TIMES DAILY
Qty: 60 CAPSULE | Refills: 0 | Status: SHIPPED | OUTPATIENT
Start: 2024-10-01 | End: 2024-10-31

## 2024-10-01 NOTE — TELEPHONE ENCOUNTER
Chel Reyes is calling to request a refill on the following medication(s):    Medication Request:  Requested Prescriptions     Pending Prescriptions Disp Refills    gabapentin (NEURONTIN) 300 MG capsule [Pharmacy Med Name: GABAPENTIN 300MG CAPSULES] 60 capsule 1     Sig: Take 1 capsule by mouth 2 times daily.       Last Visit Date (If Applicable):  7/17/2024    Next Visit Date:    Visit date not found

## 2024-10-04 ENCOUNTER — INITIAL CONSULT (OUTPATIENT)
Dept: OBGYN CLINIC | Age: 24
End: 2024-10-04
Payer: COMMERCIAL

## 2024-10-04 VITALS — SYSTOLIC BLOOD PRESSURE: 110 MMHG | BODY MASS INDEX: 20.98 KG/M2 | WEIGHT: 138 LBS | DIASTOLIC BLOOD PRESSURE: 62 MMHG

## 2024-10-04 DIAGNOSIS — K59.00 CONSTIPATION, UNSPECIFIED CONSTIPATION TYPE: Primary | ICD-10-CM

## 2024-10-04 DIAGNOSIS — F84.0 AUTISM SPECTRUM: ICD-10-CM

## 2024-10-04 DIAGNOSIS — Z97.5 IUD (INTRAUTERINE DEVICE) IN PLACE: ICD-10-CM

## 2024-10-04 DIAGNOSIS — R10.2 PELVIC PAIN: ICD-10-CM

## 2024-10-04 PROCEDURE — G8484 FLU IMMUNIZE NO ADMIN: HCPCS | Performed by: STUDENT IN AN ORGANIZED HEALTH CARE EDUCATION/TRAINING PROGRAM

## 2024-10-04 PROCEDURE — 1036F TOBACCO NON-USER: CPT | Performed by: STUDENT IN AN ORGANIZED HEALTH CARE EDUCATION/TRAINING PROGRAM

## 2024-10-04 PROCEDURE — 99213 OFFICE O/P EST LOW 20 MIN: CPT | Performed by: STUDENT IN AN ORGANIZED HEALTH CARE EDUCATION/TRAINING PROGRAM

## 2024-10-04 PROCEDURE — G8427 DOCREV CUR MEDS BY ELIG CLIN: HCPCS | Performed by: STUDENT IN AN ORGANIZED HEALTH CARE EDUCATION/TRAINING PROGRAM

## 2024-10-04 PROCEDURE — G8420 CALC BMI NORM PARAMETERS: HCPCS | Performed by: STUDENT IN AN ORGANIZED HEALTH CARE EDUCATION/TRAINING PROGRAM

## 2024-10-04 RX ORDER — UBROGEPANT 100 MG/1
1 TABLET ORAL PRN
COMMUNITY
Start: 2024-07-24

## 2024-10-04 NOTE — PROGRESS NOTES
[facility] Problem Visit    Chel Reyes  10/4/2024                       Primary Care Physician: Kavitha Marvin MD    CC:   Chief Complaint   Patient presents with    Consultation         HPI: Chel Reyes is a 24 y.o. adult      The patient was seen and examined.   - Patient and  planning on pregnancy with an embryo through [facility]. Using embryo services that are not biologically theirs due to significant medical/mental health history on both sides of the family.   - Reports needed workup from [facility] with HSG or hysteroscopy. Patient also requesting laparoscopy to look for endometriosis.  - Has IUD in place and had used IUD in the past. Does not report pelvic pain with use of IUD  - Reports constipation and bloat. Uses miralax every other day. Also uses gas-x and lactaid  - Is planning on moving forward with embryo in March.     Her Patient's last menstrual period was 2024 (approximate)..    Review of Systems:   -Constitutional: (-) fever, (-) chills  -HEENT: (-) visual disturbances, (-)headaches  -Respiratory: (-) dyspnea, (-) cough  -Cardiovascular: (-) chest pain, (-) palpitations  -Gastrointestinal: (-) abdominal pain, (-) N/V, (-) diarrhea, (-) constipation  -Genitourinary: (-) vaginal discharge  -Hematologic: (-) bruising  -Immunologic/Lymphatic: (-) recent illness, (-) recent sick contact    Obstetrical History:  OB History    Para Term  AB Living   0 0 0 0 0 0   SAB IAB Ectopic Molar Multiple Live Births   0 0 0 0 0 0       Past Medical History:      Diagnosis Date    Anxiety     Autism spectrum disorder 2019    Bipolar disorder (HCC)     Depression     Dysmenorrhea     Christen-Danlos syndrome type III 2019    GERD (gastroesophageal reflux disease)     Headache     IUD (intrauterine device) in place 2024    Irasema Tran    Menorrhagia     Migraine     With Aura: see spots, rojas, flashes followed by headache    Migraine with aura     Neuropathy

## 2024-10-09 DIAGNOSIS — Q79.60 EHLERS-DANLOS SYNDROME: ICD-10-CM

## 2024-10-09 NOTE — TELEPHONE ENCOUNTER
Chel Reyes is calling to request a refill on the following medication(s):    Medication Request:  Requested Prescriptions     Pending Prescriptions Disp Refills    baclofen (LIORESAL) 10 MG tablet [Pharmacy Med Name: BACLOFEN 10MG TABLETS] 60 tablet 1     Sig: TAKE 1 TABLET BY MOUTH TWICE DAILY AS NEEDED FOR MUSCLE SPASM       Last Visit Date (If Applicable):  7/17/2024    Next Visit Date:    Visit date not found

## 2024-10-10 ENCOUNTER — PATIENT MESSAGE (OUTPATIENT)
Dept: INTERNAL MEDICINE | Age: 24
End: 2024-10-10

## 2024-10-10 RX ORDER — BACLOFEN 10 MG/1
TABLET ORAL
Qty: 60 TABLET | Refills: 1 | Status: SHIPPED | OUTPATIENT
Start: 2024-10-10